# Patient Record
Sex: FEMALE | Race: WHITE | NOT HISPANIC OR LATINO | Employment: OTHER | URBAN - NONMETROPOLITAN AREA
[De-identification: names, ages, dates, MRNs, and addresses within clinical notes are randomized per-mention and may not be internally consistent; named-entity substitution may affect disease eponyms.]

---

## 2017-02-22 DIAGNOSIS — I10 BENIGN ESSENTIAL HYPERTENSION: ICD-10-CM

## 2017-02-22 RX ORDER — TRIAMTERENE/HYDROCHLOROTHIAZID 37.5-25 MG
TABLET ORAL
Qty: 90 TABLET | Refills: 0 | Status: SHIPPED | OUTPATIENT
Start: 2017-02-22 | End: 2017-03-15

## 2017-03-15 ENCOUNTER — OFFICE VISIT (OUTPATIENT)
Dept: PEDIATRICS | Facility: OTHER | Age: 62
End: 2017-03-15
Attending: INTERNAL MEDICINE
Payer: COMMERCIAL

## 2017-03-15 VITALS
SYSTOLIC BLOOD PRESSURE: 128 MMHG | DIASTOLIC BLOOD PRESSURE: 78 MMHG | TEMPERATURE: 97.4 F | WEIGHT: 163 LBS | HEART RATE: 57 BPM | OXYGEN SATURATION: 98 % | HEIGHT: 60 IN | RESPIRATION RATE: 20 BRPM | BODY MASS INDEX: 32 KG/M2

## 2017-03-15 DIAGNOSIS — Z12.11 COLON CANCER SCREENING: ICD-10-CM

## 2017-03-15 DIAGNOSIS — E78.2 MIXED HYPERLIPIDEMIA: ICD-10-CM

## 2017-03-15 DIAGNOSIS — I10 ESSENTIAL HYPERTENSION, BENIGN: ICD-10-CM

## 2017-03-15 DIAGNOSIS — I82.90 THROMBOSIS: ICD-10-CM

## 2017-03-15 DIAGNOSIS — H81.10 BPPV (BENIGN PAROXYSMAL POSITIONAL VERTIGO), UNSPECIFIED LATERALITY: ICD-10-CM

## 2017-03-15 DIAGNOSIS — Z00.00 ROUTINE GENERAL MEDICAL EXAMINATION AT A HEALTH CARE FACILITY: ICD-10-CM

## 2017-03-15 DIAGNOSIS — D47.3 ESSENTIAL THROMBOCYTOSIS (H): ICD-10-CM

## 2017-03-15 DIAGNOSIS — I82.90 THROMBOSIS: Primary | ICD-10-CM

## 2017-03-15 DIAGNOSIS — I10 BENIGN ESSENTIAL HYPERTENSION: ICD-10-CM

## 2017-03-15 DIAGNOSIS — Z00.00 ROUTINE GENERAL MEDICAL EXAMINATION AT A HEALTH CARE FACILITY: Primary | ICD-10-CM

## 2017-03-15 LAB
ALBUMIN SERPL-MCNC: 3.9 G/DL (ref 3.4–5)
ALP SERPL-CCNC: 97 U/L (ref 40–150)
ALT SERPL W P-5'-P-CCNC: 40 U/L (ref 0–50)
ANION GAP SERPL CALCULATED.3IONS-SCNC: 6 MMOL/L (ref 3–14)
AST SERPL W P-5'-P-CCNC: 19 U/L (ref 0–45)
BILIRUB SERPL-MCNC: 0.5 MG/DL (ref 0.2–1.3)
BUN SERPL-MCNC: 20 MG/DL (ref 7–30)
CALCIUM SERPL-MCNC: 9.4 MG/DL (ref 8.5–10.1)
CHLORIDE SERPL-SCNC: 105 MMOL/L (ref 94–109)
CHOLEST SERPL-MCNC: 186 MG/DL
CO2 SERPL-SCNC: 31 MMOL/L (ref 20–32)
CREAT SERPL-MCNC: 0.77 MG/DL (ref 0.52–1.04)
ERYTHROCYTE [DISTWIDTH] IN BLOOD BY AUTOMATED COUNT: 13.2 % (ref 10–15)
GFR SERPL CREATININE-BSD FRML MDRD: 76 ML/MIN/1.7M2
GLUCOSE SERPL-MCNC: 94 MG/DL (ref 70–99)
HCT VFR BLD AUTO: 44.9 % (ref 35–47)
HDLC SERPL-MCNC: 48 MG/DL
HGB BLD-MCNC: 15.4 G/DL (ref 11.7–15.7)
LDLC SERPL CALC-MCNC: 105 MG/DL
MCH RBC QN AUTO: 29.1 PG (ref 26.5–33)
MCHC RBC AUTO-ENTMCNC: 34.3 G/DL (ref 31.5–36.5)
MCV RBC AUTO: 85 FL (ref 78–100)
NONHDLC SERPL-MCNC: 138 MG/DL
PLATELET # BLD AUTO: 409 10E9/L (ref 150–450)
POTASSIUM SERPL-SCNC: 3.8 MMOL/L (ref 3.4–5.3)
PROT SERPL-MCNC: 7.6 G/DL (ref 6.8–8.8)
RBC # BLD AUTO: 5.29 10E12/L (ref 3.8–5.2)
SODIUM SERPL-SCNC: 142 MMOL/L (ref 133–144)
TRIGL SERPL-MCNC: 167 MG/DL
WBC # BLD AUTO: 6.8 10E9/L (ref 4–11)

## 2017-03-15 PROCEDURE — 85027 COMPLETE CBC AUTOMATED: CPT | Performed by: INTERNAL MEDICINE

## 2017-03-15 PROCEDURE — 80053 COMPREHEN METABOLIC PANEL: CPT | Performed by: INTERNAL MEDICINE

## 2017-03-15 PROCEDURE — 99396 PREV VISIT EST AGE 40-64: CPT | Performed by: INTERNAL MEDICINE

## 2017-03-15 PROCEDURE — 86803 HEPATITIS C AB TEST: CPT | Mod: 90 | Performed by: INTERNAL MEDICINE

## 2017-03-15 PROCEDURE — 36415 COLL VENOUS BLD VENIPUNCTURE: CPT | Performed by: INTERNAL MEDICINE

## 2017-03-15 PROCEDURE — 99000 SPECIMEN HANDLING OFFICE-LAB: CPT | Performed by: INTERNAL MEDICINE

## 2017-03-15 PROCEDURE — 80061 LIPID PANEL: CPT | Performed by: INTERNAL MEDICINE

## 2017-03-15 RX ORDER — TRIAMTERENE/HYDROCHLOROTHIAZID 37.5-25 MG
TABLET ORAL
Qty: 90 TABLET | Refills: 3 | Status: SHIPPED | OUTPATIENT
Start: 2017-03-15 | End: 2018-02-05

## 2017-03-15 RX ORDER — METOPROLOL SUCCINATE 100 MG/1
50 TABLET, EXTENDED RELEASE ORAL DAILY
Qty: 135 TABLET | Refills: 3 | Status: SHIPPED | OUTPATIENT
Start: 2017-03-15 | End: 2018-04-27

## 2017-03-15 ASSESSMENT — ENCOUNTER SYMPTOMS
PALPITATIONS: 0
BLOOD IN STOOL: 1
DEPRESSION: 0
DOUBLE VISION: 0
ABDOMINAL PAIN: 0
WHEEZING: 0
NERVOUS/ANXIOUS: 0
BLURRED VISION: 0
HEADACHES: 0
DIARRHEA: 0
FEVER: 0
DYSURIA: 0
MYALGIAS: 0
BACK PAIN: 0
NAUSEA: 0
SHORTNESS OF BREATH: 0
VOMITING: 0
DIZZINESS: 1
CONSTIPATION: 0
HEMATURIA: 0
COUGH: 0
CHILLS: 0
SORE THROAT: 0
INSOMNIA: 0

## 2017-03-15 ASSESSMENT — PAIN SCALES - GENERAL: PAINLEVEL: NO PAIN (0)

## 2017-03-15 NOTE — MR AVS SNAPSHOT
After Visit Summary   3/15/2017    Su Land    MRN: [id number]           Patient Information     Date Of Birth          1955        Visit Information        Provider Department      3/15/2017 8:20 AM Rosalino Castillo, DO [facility]        Today's Diagnoses     Routine general medical examination at a health care facility    -  1    Essential hypertension        Colon cancer screening        BPPV (benign paroxysmal positional vertigo), unspecified laterality        Essential thrombocytosis (H)        Benign essential hypertension        Essential hypertension, benign           Follow-ups after your visit        Additional Services     GENERAL SURG ADULT REFERRAL       Your provider has referred you to: PREFERRED PROVIDERS:  [facility]    Please be aware that coverage of these services is subject to the terms and limitations of your health insurance plan.  Call member services at your health plan with any benefit or coverage questions.      Please bring the following with you to your appointment:    (1) Any X-Rays, CTs or MRIs which have been performed.  Contact the facility where they were done to arrange for  prior to your scheduled appointment.   (2) List of current medications   (3) This referral request   (4) Any documents/labs given to you for this referral                  Follow-up notes from your care team     Return in about 1 year (around 3/15/2018), or annual physical.      Who to contact     If you have questions or need follow up information about today's clinic visit or your schedule please contact [facility] directly at [phone].  Normal or non-critical lab and imaging results will be communicated to you by MyChart, letter or phone within 4 business days after the clinic has received the results. If you do not hear from us within 7 days, please contact the clinic through MyChart or phone. If you have a critical or  abnormal lab result, we will notify you by phone as soon as possible.  Submit refill requests through [employer] or call your pharmacy and they will forward the refill request to us. Please allow 3 business days for your refill to be completed.          Additional Information About Your Visit        [email or web address] Information     [employer] gives you secure access to your electronic health record. If you see a primary care provider, you can also send messages to your care team and make appointments. If you have questions, please call your primary care clinic.  If you do not have a primary care provider, please call [phone] and they will assist you.        Care EveryWhere ID     This is your Care EveryWhere ID. This could be used by other organizations to access your [place] medical records  [id number]        Your Vitals Were     Pulse Temperature Respirations Height Pulse Oximetry BMI (Body Mass Index)    57 97.4  F (36.3  C) (Tympanic) 20 5' (1.524 m) 98% 31.83 kg/m2       Blood Pressure from Last 3 Encounters:   03/15/17 128/78   01/07/16 115/68   12/31/15 134/78    Weight from Last 3 Encounters:   03/15/17 163 lb (73.9 kg)   01/07/16 159 lb (72.1 kg)   11/19/15 160 lb (72.6 kg)              We Performed the Following     GENERAL SURG ADULT REFERRAL     Hepatitis C antibody          Today's Medication Changes          These changes are accurate as of: 3/15/17  8:29 AM.  If you have any questions, ask your nurse or doctor.               These medicines have changed or have updated prescriptions.        Dose/Directions    metoprolol 100 MG 24 hr tablet   Commonly known as:  TOPROL-XL   This may have changed:  how much to take   Used for:  Essential hypertension, benign   Changed by:  Rosalnio Castillo,         Dose:  50 mg   Take 0.5 tablets (50 mg) by mouth daily TAKE 1 TABLET BY MOUTH EVERY MORNING AND 1/2 TABLET BY MOUTH EVERY NIGHT AT BEDTIME   Quantity:  135 tablet   Refills:  3        triamterene-hydrochlorothiazide 37.5-25 MG per tablet   Commonly known as:  MAXZIDE-25   This may have changed:  See the new instructions.   Used for:  Benign essential hypertension   Changed by:  Rosalino Castillo DO        TAKE 1 TABLET BY MOUTH DAILY IN THE MORNING   Quantity:  90 tablet   Refills:  3            Where to get your medicines      These medications were sent to [id number] - [place], [place] - [address] AT [facility] [address], [place] [address]     Phone:  [phone]     metoprolol 100 MG 24 hr tablet    triamterene-hydrochlorothiazide 37.5-25 MG per tablet                Primary Care Provider Office Phone # Fax #    Rosalino Castillo -[phone]       [facility] [place] [address]  [place] [address]        Thank you!     [facility] you [facility]  [facility] your [facility]. Our goal is always to provide you with excellent care. Hearing back from our patients is one way we can continue to improve our services. Please take a few minutes to complete the written survey that you may receive in the mail after your visit with us. Thank you!             Your Updated Medication List - Protect others around you: Learn how to safely use, store and throw away your medicines at www.disposemymeds.org.          This list is accurate as of: 3/15/17  8:29 AM.  Always use your most recent med list.                   Brand Name Dispense Instructions for use    ASPIRIN PO      Take 81 mg by mouth daily       metoprolol 100 MG 24 hr tablet    TOPROL-XL    135 tablet    Take 0.5 tablets (50 mg) by mouth daily TAKE 1 TABLET BY MOUTH EVERY MORNING AND 1/2 TABLET BY MOUTH EVERY NIGHT AT BEDTIME       Multi-vitamin Tabs tablet      Take 1 tablet by mouth daily       triamterene-hydrochlorothiazide 37.5-25 MG per tablet    MAXZIDE-25    90 tablet    TAKE 1 TABLET BY MOUTH DAILY IN THE MORNING

## 2017-03-15 NOTE — NURSING NOTE
Chief Complaint   Patient presents with     Physical     Colonoscopy     due       Initial /78  Pulse 57  Temp 97.4  F (36.3  C) (Tympanic)  Resp 20  Ht 5' (1.524 m)  Wt 163 lb (73.9 kg)  SpO2 98%  BMI 31.83 kg/m2 Estimated body mass index is 31.83 kg/(m^2) as calculated from the following:    Height as of this encounter: 5' (1.524 m).    Weight as of this encounter: 163 lb (73.9 kg).  Medication Reconciliation: makenzie Guardado

## 2017-03-15 NOTE — PROGRESS NOTES
HPI  Patient is a 60 yo female with HTN and essential thrombocytosis who presents for her annual exam.  Her last PAP was in .  She  Is due for a colonoscopy.  Her last mammogram was in 2017.        Past Medical History   Diagnosis Date     Abnormal weight gain      Acute pharyngitis      Acute sinusitis, unspecified      Conjunctivitis unspecified      Essential thrombocytosis (H)      Confirmed with bone marrow biopsy     H/O mammogram 2014     Hypertension      Other screening mammogram      Pain in joint, lower leg      Routine general medical examination at a health care facility      Special screening for malignant neoplasms, colon      Streptococcal sore throat      Past Surgical History   Procedure Laterality Date     Abdomen surgery        x2     Appendectomy       Gyn surgery        x2     Orthopedic surgery  2013     left total knee replacement     Orthopedic surgery  10/28/2014     right total knee replacement     Colonoscopy  age 50      Normal         Review of Systems   Constitutional: Negative for chills, fever and malaise/fatigue.   HENT: Negative for congestion, ear pain, hearing loss, sore throat and tinnitus.    Eyes: Negative for blurred vision and double vision.        Her last eye exam was 10/2016.   Respiratory: Negative for cough, shortness of breath and wheezing.    Cardiovascular: Negative for chest pain, palpitations and leg swelling.   Gastrointestinal: Positive for blood in stool. Negative for abdominal pain, constipation, diarrhea, melena, nausea and vomiting.   Genitourinary: Negative for dysuria and hematuria.        She denies any urinary retention or incontinence.   Musculoskeletal: Negative for back pain, joint pain and myalgias.   Skin: Negative for rash.   Neurological: Positive for dizziness. Negative for headaches.        She has a bad dizziness spell after her mothers .   Psychiatric/Behavioral: Negative for depression. The patient is not  nervous/anxious and does not have insomnia.          Physical Exam   Constitutional: She is oriented to person, place, and time and well-developed, well-nourished, and in no distress. No distress.   HENT:   Head: Normocephalic.   Right Ear: Tympanic membrane, external ear and ear canal normal.   Left Ear: Tympanic membrane, external ear and ear canal normal.   Mouth/Throat: No oropharyngeal exudate or posterior oropharyngeal edema.   Eyes: EOM are normal. Pupils are equal, round, and reactive to light. No scleral icterus.   Neck: Neck supple. Carotid bruit is not present. No thyromegaly present.   Cardiovascular: Normal rate, regular rhythm, normal heart sounds and intact distal pulses.    No murmur heard.  Pulmonary/Chest: Effort normal and breath sounds normal. She has no wheezes. She has no rales.   Abdominal: Soft. Bowel sounds are normal. She exhibits no shifting dullness, no distension, no pulsatile midline mass and no mass. There is no hepatosplenomegaly. There is no tenderness. Hernia confirmed negative in the umbilical area and confirmed negative in the ventral area.   Musculoskeletal: She exhibits no edema.   Lymphadenopathy:     She has no cervical adenopathy.   Neurological: She is alert and oriented to person, place, and time. She has normal strength, normal reflexes and intact cranial nerves. She displays no atrophy, no tremor, facial symmetry and normal speech. Gait normal. Gait normal.   Reflex Scores:       Brachioradialis reflexes are 2+ on the right side and 2+ on the left side.       Patellar reflexes are 2+ on the right side and 2+ on the left side.       Achilles reflexes are 2+ on the right side and 2+ on the left side.  Skin: No rash noted.   Psychiatric: Mood, memory, affect and judgment normal.       Breasts:Patient refused breast exam since her mammogram was one months ago.        Labs:  Results for orders placed or performed in visit on 03/15/17   CBC with platelets   Result Value Ref  Range    WBC 6.8 4.0 - 11.0 10e9/L    RBC Count 5.29 (H) 3.8 - 5.2 10e12/L    Hemoglobin 15.4 11.7 - 15.7 g/dL    Hematocrit 44.9 35.0 - 47.0 %    MCV 85 78 - 100 fl    MCH 29.1 26.5 - 33.0 pg    MCHC 34.3 31.5 - 36.5 g/dL    RDW 13.2 10.0 - 15.0 %    Platelet Count 409 150 - 450 10e9/L   Comprehensive metabolic panel (BMP + Alb, Alk Phos, ALT, AST, Total. Bili, TP)   Result Value Ref Range    Sodium 142 133 - 144 mmol/L    Potassium 3.8 3.4 - 5.3 mmol/L    Chloride 105 94 - 109 mmol/L    Carbon Dioxide 31 20 - 32 mmol/L    Anion Gap 6 3 - 14 mmol/L    Glucose 94 70 - 99 mg/dL    Urea Nitrogen 20 7 - 30 mg/dL    Creatinine 0.77 0.52 - 1.04 mg/dL    GFR Estimate 76 >60 mL/min/1.7m2    GFR Estimate If Black >90   GFR Calc   >60 mL/min/1.7m2    Calcium 9.4 8.5 - 10.1 mg/dL    Bilirubin Total 0.5 0.2 - 1.3 mg/dL    Albumin 3.9 3.4 - 5.0 g/dL    Protein Total 7.6 6.8 - 8.8 g/dL    Alkaline Phosphatase 97 40 - 150 U/L    ALT 40 0 - 50 U/L    AST 19 0 - 45 U/L   Lipid Profile (Chol, Trig, HDL, LDL calc)   Result Value Ref Range    Cholesterol 186 <200 mg/dL    Triglycerides 167 (H) <150 mg/dL    HDL Cholesterol 48 (L) >49 mg/dL    LDL Cholesterol Calculated 105 (H) <100 mg/dL    Non HDL Cholesterol 138 (H) <130 mg/dL           Imaging:  NA    ASSESSMENT /PLAN:  (Z00.00) Routine general medical examination at a health care facility  (primary encounter diagnosis)  Comment: She is up to date on her PAP and mammogram.  Her immunizations are up to date.  Plan:   She is due for a colonoscopy.    (I10) Benign essential hypertension  Comment: At goal.  Plan:   She will continue Toprol XL 50 mg and UQRXOYY75.6-25 mg daily.    (Z12.11) Colon cancer screening  Comment:   Plan:   GENERAL SURG ADULT REFERRAL    (H81.10) BPPV (benign paroxysmal positional vertigo), unspecified laterality  Comment: She has infrequent symptoms.  Plan:  Consider Meclizine if her symptoms become more prominent or frequent.     (D47.3)  Essential thrombocytosis (H)  Comment: Her pletelet count is well controlled at 400,000  Plan:   She will continue ASA 81 mg daily.      (E78.2) Mixed hyperlipidemia  Comment: The 10-year ASCVD risk score (Tyler GILL Jr., et al., 2013) is: 5.6%    Values used to calculate the score:      Age: 61 years      Sex: Female      Is Non- : No      Diabetic: No      Tobacco smoker: No      Systolic Blood Pressure: 128 mmHg      Is Prescribed Antihypertensives: Yes      HDL Cholesterol: 48 mg/dL      Total Cholesterol: 186 mg/dL    Plan:  Her risk of CAD is low at this time with well controlled blood pressure She will make a point to be more active to reduce her risk.            Follow up with Provider - 1 year for an annual physical.        Rosalino Castillo DO

## 2017-03-16 LAB — HCV AB SERPL QL IA: NORMAL

## 2017-03-17 ENCOUNTER — TELEPHONE (OUTPATIENT)
Dept: SURGERY | Facility: OTHER | Age: 62
End: 2017-03-17

## 2017-03-17 DIAGNOSIS — Z12.11 SCREENING FOR MALIGNANT NEOPLASM OF COLON: Primary | ICD-10-CM

## 2017-03-17 RX ORDER — SODIUM, POTASSIUM,MAG SULFATES 17.5-3.13G
2 SOLUTION, RECONSTITUTED, ORAL ORAL SEE ADMIN INSTRUCTIONS
Qty: 2 BOTTLE | Refills: 0 | Status: ON HOLD | COMMUNITY
Start: 2017-03-17 | End: 2017-04-17

## 2017-03-17 NOTE — PATIENT INSTRUCTIONS
Thank you for allowing Dr Faulkner and our surgical team to participate in your care.  If you have a scheduling or an appointment question please contact Devika, our Health Unit Coordinator, at her direct line [phone].   ALL nursing questions or concerns can be directed to your surgical nurse at: [phone]-Afua    You are scheduled for a: Colonoscopy with possible biopsy   Your procedure date is: Monday, April 17, 2017    You have been ordered Suprep as your mechanical bowel prep. Please pick this up from your preferred pharmacy.      Bowel Prep    Clear liquid diet only on Sunday, April 16, 2017  the day before the examination.    Steve prep - one bottle at 6pm Sunday, April 16, 2017 the evening prior to the examination and follow with Two 16 ounce glasses water.    Steve prep - one bottle at 0400 on Monday, April 17, 2017 the day of the exam.  Follow with Two 16 ounce glasses of water.    Nothing by mouth after finishing the second 16 ounce glass of water the morning of the procedure.         HOW TO PREPARE-        You need a friend or family member available to drive you home AND stay with you for 4 hours after you leave the hospital. You will not be allowed to drive yourself. IF you need to take a taxi or the bus you MUST have a responsible person to ride with you. YOUR PROCEDURE WILL BE CANCELLED IF YOU DO NOT HAVE A RESPONSIBLE ADULT TO DRIVE YOU HOME.       You need to call our Surgery Education Nurses 1-2 weeks prior to your surgery date at [phone] or toll free [phone]. Please have you medication and allergy lists ready.       Stop your aspirin or other NSAIDs(Ibuprofen, Motrin, Aleve, Celebrex, Naproxen, etc...) 7 days before your surgery.      Hospital admitting will call you the day before your surgery with your arrival time. If you are scheduled on a Monday admitting will call you the Friday before.      Please call your primary care physician if you should become ill within 24 hours of  scheduled surgery. (ex.vomiting, diarrhea, fever)

## 2017-03-17 NOTE — MR AVS SNAPSHOT
After Visit Summary   3/17/2017    Su Land    MRN: [id number]           Patient Information     Date Of Birth          1955        Visit Information        Provider Department      3/17/2017 10:07 AM Isabelle Faulkner MD [facility]        Care Instructions    Thank you for allowing Dr Faulkner and our surgical team to participate in your care.  If you have a scheduling or an appointment question please contact Devika, our Health Unit Coordinator, at her direct line [phone].   ALL nursing questions or concerns can be directed to your surgical nurse at: [phone]    You are scheduled for a: Colonoscopy with possible biopsy   Your procedure date is: Monday April 17, 2017    You have been ordered Suprep as your mechanical bowel prep. Please pick this up from your preferred pharmacy.     Bowel Prep    Clear liquid diet only on Sunday, April 16, 2017  the day before the examination.    Steve prep - one bottle at 6pm Sunday, April 16, 2017 the evening prior to the examination and follow with Two 16 ounce glasses water.    Steve prep - one bottle at 0400 on Monday, April 17, 2017 the day of the exam.  Follow with Two 16 ounce glasses of water.    Nothing by mouth after finishing the second 16 ounce glass of water the morning of the procedure.         HOW TO PREPARE-        You need a friend or family member available to drive you home AND stay with you for 4 hours after you leave the hospital. You will not be allowed to drive yourself. IF you need to take a taxi or the bus you MUST have a responsible person to ride with you. YOUR PROCEDURE WILL BE CANCELLED IF YOU DO NOT HAVE A RESPONSIBLE ADULT TO DRIVE YOU HOME.       You need to call our Surgery Education Nurses 1-2 weeks prior to your surgery date at [phone] or toll free [phone]. Please have you medication and allergy lists ready.       Stop your aspirin or other NSAIDs(Ibuprofen, Motrin, Aleve, Celebrex,  Naproxen, etc...) 7 days before your surgery.      Hospital admitting will call you the day before your surgery with your arrival time. If you are scheduled on a Monday admitting will call you the Friday before.      Please call your primary care physician if you should become ill within 24 hours of scheduled surgery. (ex.vomiting, diarrhea, fever)          Follow-ups after your visit        Who to contact     If you have questions or need follow up information about today's clinic visit or your schedule please contact [facility] directly at [phone].  Normal or non-critical lab and imaging results will be communicated to you by [employer], letter or phone within 4 business days after the clinic has received the results. If you do not hear from us within 7 days, please contact the clinic through [employer] or phone. If you have a critical or abnormal lab result, we will notify you by phone as soon as possible.  Submit refill requests through [employer] or call your pharmacy and they will forward the refill request to us. Please allow 3 business days for your refill to be completed.          Additional Information About Your Visit        [employer] Information     [employer] gives you secure access to your electronic health record. If you see a primary care provider, you can also send messages to your care team and make appointments. If you have questions, please call your primary care clinic.  If you do not have a primary care provider, please call [phone] and they will assist you.        Care EveryWhere ID     This is your Care EveryWhere ID. This could be used by other organizations to access your [place] medical records  [id number]         Blood Pressure from Last 3 Encounters:   03/15/17 128/78   01/07/16 115/68   12/31/15 134/78    Weight from Last 3 Encounters:   03/15/17 163 lb (73.9 kg)   01/07/16 159 lb (72.1 kg)   11/19/15 160 lb (72.6 kg)              Today, you had the following     No orders  found for display       Primary Care Provider Office Phone # Fax #    Rosalino Castillo -[phone]       [facility] [place] [address]  [facility] [address]        Thank you!     Thank you for choosing [facility] [place]  for your care. Our goal is always to provide you with excellent care. Hearing back from our patients is one way we can continue to improve our services. Please take a few minutes to complete the written survey that you may receive in the mail after your visit with us. Thank you!             Your Updated Medication List - Protect others around you: Learn how to safely use, store and throw away your medicines at www.disposemymeds.org.          This list is accurate as of: 3/17/17 10:08 AM.  Always use your most recent med list.                   Brand Name Dispense Instructions for use    ASPIRIN PO      Take 81 mg by mouth daily       metoprolol 100 MG 24 hr tablet    TOPROL-XL    135 tablet    Take 0.5 tablets (50 mg) by mouth daily TAKE 1 TABLET BY MOUTH EVERY MORNING AND 1/2 TABLET BY MOUTH EVERY NIGHT AT BEDTIME       Multi-vitamin Tabs tablet      Take 1 tablet by mouth daily       Na Sulfate-K Sulfate-Mg Sulf solution    SUPREP BOWEL PREP    2 Bottle    Take 354 mLs (2 Bottles) by mouth See Admin Instructions       triamterene-hydrochlorothiazide 37.5-25 MG per tablet    MAXZIDE-25    90 tablet    TAKE 1 TABLET BY MOUTH DAILY IN THE MORNING

## 2017-03-17 NOTE — PATIENT INSTRUCTIONS
Thank you for allowing Dr Faulkner and our surgical team to participate in your care.  If you have a scheduling or an appointment question please contact Devika, our Health Unit Coordinator, at her direct line [phone].   ALL nursing questions or concerns can be directed to your surgical nurse at: [phone]    You are scheduled for a: Colonoscopy with possible biopsy   Your procedure date is: Monday April 17, 2017    You have been ordered Suprep as your mechanical bowel prep. Please pick this up from your preferred pharmacy.     Bowel Prep    Clear liquid diet only on Sunday, April 16, 2017  the day before the examination.    Steve prep - one bottle at 6pm Sunday, April 16, 2017 the evening prior to the examination and follow with Two 16 ounce glasses water.    Steve prep - one bottle at 0400 on Monday, April 17, 2017 the day of the exam.  Follow with Two 16 ounce glasses of water.    Nothing by mouth after finishing the second 16 ounce glass of water the morning of the procedure.         HOW TO PREPARE-        You need a friend or family member available to drive you home AND stay with you for 4 hours after you leave the hospital. You will not be allowed to drive yourself. IF you need to take a taxi or the bus you MUST have a responsible person to ride with you. YOUR PROCEDURE WILL BE CANCELLED IF YOU DO NOT HAVE A RESPONSIBLE ADULT TO DRIVE YOU HOME.       You need to call our Surgery Education Nurses 1-2 weeks prior to your surgery date at [phone] or toll free [phone]. Please have you medication and allergy lists ready.       Stop your aspirin or other NSAIDs(Ibuprofen, Motrin, Aleve, Celebrex, Naproxen, etc...) 7 days before your surgery.      Hospital admitting will call you the day before your surgery with your arrival time. If you are scheduled on a Monday admitting will call you the Friday before.      Please call your primary care physician if you should become ill within 24 hours of  scheduled surgery. (ex.vomiting, diarrhea, fever)

## 2017-03-17 NOTE — TELEPHONE ENCOUNTER
Patient contacted via telephone regarding a referral placed by Dr Castillo to the surgery department for a colonoscopy.  Patient is scheduled for April 17, 2017 with Dr Faulkner.

## 2017-04-13 NOTE — OR NURSING
Su phoned in and [facility] does not have Suprep prescription.  [facility].  Hanna Mack

## 2017-04-17 ENCOUNTER — HOSPITAL ENCOUNTER (OUTPATIENT)
Facility: HOSPITAL | Age: 62
Discharge: HOME | End: 2017-04-17
Attending: SURGERY | Admitting: SURGERY
Payer: COMMERCIAL

## 2017-04-17 ENCOUNTER — SURGERY (OUTPATIENT)
Age: 62
End: 2017-04-17

## 2017-04-17 ENCOUNTER — ANESTHESIA (OUTPATIENT)
Dept: SURGERY | Facility: HOSPITAL | Age: 62
End: 2017-04-17
Payer: COMMERCIAL

## 2017-04-17 ENCOUNTER — ANESTHESIA EVENT (OUTPATIENT)
Dept: SURGERY | Facility: HOSPITAL | Age: 62
End: 2017-04-17
Payer: COMMERCIAL

## 2017-04-17 VITALS
OXYGEN SATURATION: 97 % | DIASTOLIC BLOOD PRESSURE: 71 MMHG | WEIGHT: 162.2 LBS | SYSTOLIC BLOOD PRESSURE: 123 MMHG | BODY MASS INDEX: 31.84 KG/M2 | HEART RATE: 65 BPM | RESPIRATION RATE: 16 BRPM | TEMPERATURE: 96.7 F | HEIGHT: 60 IN

## 2017-04-17 PROCEDURE — 40000306 ZZH STATISTIC PRE PROC ASSESS II: Performed by: SURGERY

## 2017-04-17 PROCEDURE — 01999 UNLISTED ANES PROCEDURE: CPT | Performed by: NURSE ANESTHETIST, CERTIFIED REGISTERED

## 2017-04-17 PROCEDURE — 25000125 ZZHC RX 250: Performed by: NURSE ANESTHETIST, CERTIFIED REGISTERED

## 2017-04-17 PROCEDURE — 45385 COLONOSCOPY W/LESION REMOVAL: CPT | Mod: PT | Performed by: ANESTHESIOLOGY

## 2017-04-17 PROCEDURE — 36000050 ZZH SURGERY LEVEL 2 1ST 30 MIN: Performed by: SURGERY

## 2017-04-17 PROCEDURE — 88305 TISSUE EXAM BY PATHOLOGIST: CPT | Mod: TC | Performed by: SURGERY

## 2017-04-17 PROCEDURE — 25000128 H RX IP 250 OP 636: Performed by: NURSE ANESTHETIST, CERTIFIED REGISTERED

## 2017-04-17 PROCEDURE — 71000027 ZZH RECOVERY PHASE 2 EACH 15 MINS: Performed by: SURGERY

## 2017-04-17 PROCEDURE — 37000008 ZZH ANESTHESIA TECHNICAL FEE, 1ST 30 MIN: Performed by: SURGERY

## 2017-04-17 PROCEDURE — 25800025 ZZH RX 258: Performed by: ANESTHESIOLOGY

## 2017-04-17 PROCEDURE — 45380 COLONOSCOPY AND BIOPSY: CPT | Mod: PT | Performed by: SURGERY

## 2017-04-17 RX ORDER — FENTANYL CITRATE 50 UG/ML
INJECTION, SOLUTION INTRAMUSCULAR; INTRAVENOUS PRN
Status: DISCONTINUED | OUTPATIENT
Start: 2017-04-17 | End: 2017-04-17

## 2017-04-17 RX ORDER — HYDRALAZINE HYDROCHLORIDE 20 MG/ML
2.5-5 INJECTION INTRAMUSCULAR; INTRAVENOUS EVERY 10 MIN PRN
Status: DISCONTINUED | OUTPATIENT
Start: 2017-04-17 | End: 2017-04-17 | Stop reason: HOSPADM

## 2017-04-17 RX ORDER — LIDOCAINE HYDROCHLORIDE 20 MG/ML
INJECTION, SOLUTION INFILTRATION; PERINEURAL PRN
Status: DISCONTINUED | OUTPATIENT
Start: 2017-04-17 | End: 2017-04-17

## 2017-04-17 RX ORDER — NALOXONE HYDROCHLORIDE 0.4 MG/ML
.1-.4 INJECTION, SOLUTION INTRAMUSCULAR; INTRAVENOUS; SUBCUTANEOUS
Status: DISCONTINUED | OUTPATIENT
Start: 2017-04-17 | End: 2017-04-17 | Stop reason: HOSPADM

## 2017-04-17 RX ORDER — ONDANSETRON 4 MG/1
4 TABLET, ORALLY DISINTEGRATING ORAL EVERY 30 MIN PRN
Status: DISCONTINUED | OUTPATIENT
Start: 2017-04-17 | End: 2017-04-17 | Stop reason: HOSPADM

## 2017-04-17 RX ORDER — SODIUM CHLORIDE, SODIUM LACTATE, POTASSIUM CHLORIDE, CALCIUM CHLORIDE 600; 310; 30; 20 MG/100ML; MG/100ML; MG/100ML; MG/100ML
INJECTION, SOLUTION INTRAVENOUS CONTINUOUS
Status: DISCONTINUED | OUTPATIENT
Start: 2017-04-17 | End: 2017-04-17 | Stop reason: HOSPADM

## 2017-04-17 RX ORDER — MEPERIDINE HYDROCHLORIDE 25 MG/ML
12.5 INJECTION INTRAMUSCULAR; INTRAVENOUS; SUBCUTANEOUS
Status: DISCONTINUED | OUTPATIENT
Start: 2017-04-17 | End: 2017-04-17 | Stop reason: HOSPADM

## 2017-04-17 RX ORDER — KETOROLAC TROMETHAMINE 30 MG/ML
30 INJECTION, SOLUTION INTRAMUSCULAR; INTRAVENOUS EVERY 6 HOURS PRN
Status: DISCONTINUED | OUTPATIENT
Start: 2017-04-17 | End: 2017-04-17 | Stop reason: HOSPADM

## 2017-04-17 RX ORDER — DEXAMETHASONE SODIUM PHOSPHATE 4 MG/ML
4 INJECTION, SOLUTION INTRA-ARTICULAR; INTRALESIONAL; INTRAMUSCULAR; INTRAVENOUS; SOFT TISSUE EVERY 10 MIN PRN
Status: DISCONTINUED | OUTPATIENT
Start: 2017-04-17 | End: 2017-04-17 | Stop reason: HOSPADM

## 2017-04-17 RX ORDER — ONDANSETRON 2 MG/ML
4 INJECTION INTRAMUSCULAR; INTRAVENOUS EVERY 30 MIN PRN
Status: DISCONTINUED | OUTPATIENT
Start: 2017-04-17 | End: 2017-04-17 | Stop reason: HOSPADM

## 2017-04-17 RX ORDER — PROMETHAZINE HYDROCHLORIDE 25 MG/ML
12.5 INJECTION, SOLUTION INTRAMUSCULAR; INTRAVENOUS
Status: DISCONTINUED | OUTPATIENT
Start: 2017-04-17 | End: 2017-04-17 | Stop reason: HOSPADM

## 2017-04-17 RX ORDER — FENTANYL CITRATE 50 UG/ML
25-50 INJECTION, SOLUTION INTRAMUSCULAR; INTRAVENOUS
Status: DISCONTINUED | OUTPATIENT
Start: 2017-04-17 | End: 2017-04-17 | Stop reason: HOSPADM

## 2017-04-17 RX ORDER — PROPOFOL 10 MG/ML
INJECTION, EMULSION INTRAVENOUS PRN
Status: DISCONTINUED | OUTPATIENT
Start: 2017-04-17 | End: 2017-04-17

## 2017-04-17 RX ORDER — LABETALOL HYDROCHLORIDE 5 MG/ML
10 INJECTION, SOLUTION INTRAVENOUS
Status: DISCONTINUED | OUTPATIENT
Start: 2017-04-17 | End: 2017-04-17 | Stop reason: HOSPADM

## 2017-04-17 RX ORDER — LIDOCAINE 40 MG/G
CREAM TOPICAL
Status: DISCONTINUED | OUTPATIENT
Start: 2017-04-17 | End: 2017-04-17 | Stop reason: HOSPADM

## 2017-04-17 RX ORDER — ALBUTEROL SULFATE 0.83 MG/ML
2.5 SOLUTION RESPIRATORY (INHALATION) EVERY 4 HOURS PRN
Status: DISCONTINUED | OUTPATIENT
Start: 2017-04-17 | End: 2017-04-17 | Stop reason: HOSPADM

## 2017-04-17 RX ADMIN — LIDOCAINE HYDROCHLORIDE 40 MG: 20 INJECTION, SOLUTION INFILTRATION; PERINEURAL at 08:45

## 2017-04-17 RX ADMIN — PROPOFOL 10 MG: 10 INJECTION, EMULSION INTRAVENOUS at 08:50

## 2017-04-17 RX ADMIN — PROPOFOL 10 MG: 10 INJECTION, EMULSION INTRAVENOUS at 08:54

## 2017-04-17 RX ADMIN — MIDAZOLAM HYDROCHLORIDE 2 MG: 1 INJECTION, SOLUTION INTRAMUSCULAR; INTRAVENOUS at 08:42

## 2017-04-17 RX ADMIN — FENTANYL CITRATE 100 MCG: 50 INJECTION, SOLUTION INTRAMUSCULAR; INTRAVENOUS at 08:42

## 2017-04-17 RX ADMIN — PROPOFOL 10 MG: 10 INJECTION, EMULSION INTRAVENOUS at 09:00

## 2017-04-17 RX ADMIN — PROPOFOL 10 MG: 10 INJECTION, EMULSION INTRAVENOUS at 08:48

## 2017-04-17 RX ADMIN — PROPOFOL 10 MG: 10 INJECTION, EMULSION INTRAVENOUS at 08:52

## 2017-04-17 RX ADMIN — SODIUM CHLORIDE, POTASSIUM CHLORIDE, SODIUM LACTATE AND CALCIUM CHLORIDE: 600; 310; 30; 20 INJECTION, SOLUTION INTRAVENOUS at 08:03

## 2017-04-17 RX ADMIN — PROPOFOL 10 MG: 10 INJECTION, EMULSION INTRAVENOUS at 08:57

## 2017-04-17 RX ADMIN — PROPOFOL 20 MG: 10 INJECTION, EMULSION INTRAVENOUS at 08:45

## 2017-04-17 RX ADMIN — PROPOFOL 10 MG: 10 INJECTION, EMULSION INTRAVENOUS at 09:02

## 2017-04-17 RX ADMIN — PROPOFOL 10 MG: 10 INJECTION, EMULSION INTRAVENOUS at 08:46

## 2017-04-17 RX ADMIN — PROPOFOL 10 MG: 10 INJECTION, EMULSION INTRAVENOUS at 08:47

## 2017-04-17 NOTE — DISCHARGE INSTRUCTIONS
INSTRUCTIONS AFTER COLONOSCOPY    WHEN YOU ARE BACK HOME:    Plan to rest for an hour or two after you get home.    You may have some cramping or pressure until you pass gas.    You may resume your regular medications.    Eat a small, light meal at first, and then gradually return to normal meal sizes.  If you had a polyp removed:    Slight bleeding may occur.  You may have a slight blood stain on the toilet paper after a bowel movement.    To lessen the chance of bleeding, avoid heavy exercise for ONE WEEK.  This includes heavy lifting, vigorous sport activities, and heavy physical labor.  You may resume your normal sexual activity.      Avoid aspirin or aspirin products if instructed by your doctor.    WHAT TO WATCH FOR:  Problems rarely occur after the exam; however, it is important for you to watch for early signs of possible problems.  If you have     Unusual pain in your abdomen    Nausea and vomiting that persists    Excessive bleeding    Black or bloody bowel movements    Fever or temperature above 100.6 F  Please call your doctor ([facility] [phone]) or go to the nearest hospital emergency room.    Post-Anesthesia Patient Instructions    IMMEDIATELY FOLLOWING SURGERY:  Do not drive or operate machinery for the first twenty four hours after surgery.  Do not make any important decisions for twenty four hours after surgery or while taking narcotic pain medications or sedatives.  If you develop intractable nausea and vomiting or a severe headache please notify your doctor immediately.    FOLLOW-UP:  Please make an appointment with your surgeon as instructed. You do not need to follow up with anesthesia unless specifically instructed to do so.    WOUND CARE INSTRUCTIONS (if applicable):  Keep a dry clean dressing on the anesthesia/puncture wound site if there is drainage.  Once the wound has quit draining you may leave it open to air.  Generally you should leave the bandage intact for twenty four  hours unless there is drainage.  If the epidural site drains for more than 36-48 hours please call the anesthesia department.    QUESTIONS?:  Please feel free to call your physician or the hospital  if you have any questions, and they will be happy to assist you.

## 2017-04-17 NOTE — IP AVS SNAPSHOT
[facility] II    [address] [address]    Phone:  [phone]                                       After Visit Summary   4/17/2017    Su Land    MRN: [id number]           After Visit Summary Signature Page     I have received my discharge instructions, and my questions have been answered. I have discussed any challenges I see with this plan with the nurse or doctor.    ..........................................................................................................................................  Patient/Patient Representative Signature      ..........................................................................................................................................  Patient Representative Print Name and Relationship to Patient    ..................................................               ................................................  Date                                            Time    ..........................................................................................................................................  Reviewed by Signature/Title    ...................................................              ..............................................  Date                                                            Time

## 2017-04-17 NOTE — OP NOTE
Su Land MRN# [id number]   YOB: 1955 Age: 61 year old      Date of Admission:  4/17/2017    Primary care provider: Rosalino Castillo    PREOPERATIVE DIAGNOSIS:   Screening colonoscopy.         POSTOPERATIVE DIAGNOSIS:    Sessile polyp, 45 cm, sessile polyps x 4 rectum (all < 0.4 cm)    PROCEDURE:  Whole colon colonoscopy, cold forceps polypectomy x 5.         INDICATIONS:  This 61 year old female presents for interval screening colonoscopy.        OPERATIVE FINDINGS:  There were very small sessile polyps noted, one at 45 cm and 4 in the rectum retrieved with the cold forceps.  The colonic mucosa was otherwise without finding from anus to cecum.          DESCRIPTION OF PROCEDURE:  With the patient in the supine position on the transport cart, IV sedation was administered by the nurse anesthetist.  Her correct identity and planned procedure were confirmed during the requisite timeout pause and she was rolled to the left lateral position.  The anus was digitally dilated and the fiberoptic colonoscope was introduced and negotiated through the length of the colon to the cecal base.  The cecum was intubated and its landmarks clearly identified.  A circumferential examination of the mucosa on introduction of the colonoscope and on its slow withdrawal confirmed the presence of one 0.5 cm polyp vs hyperplastic mucosa at 45 cm and 4 similar benign appearing lesions in the rectum.  The rectal sessile lesions were < 0.4 cm diameter.  There was no other suggestion of neoplasia, inflammation and/or stricture.  There were no diverticuli noted.  Retroflex in the rectal ampulla showed no evidence of pathology.  Air was aspirated and the colonoscope was withdrawn; the patient was returned to day surgery in good condition, without suggestion of complication.  Her return interval will be dictated by histopathologic examination of the specimens with hyperplastic alone suggesting 10 year and number if  adenomatous defining 3 vs 5 years.   The post surgical debriefing was held and acknowledged at completion.          JACKLYN NORIEGA MD, FACS     4/17/2017 9:04 AM

## 2017-04-17 NOTE — ANESTHESIA CARE TRANSFER NOTE
Patient: Su Land    Procedure(s):  WHOLE COLON COLONOSCOPY WITH POLYPECTOMY - Wound Class: II-Clean Contaminated    Diagnosis: SCREENING FOR COLON CANCER  Diagnosis Additional Information: No value filed.    Anesthesia Type:   MAC     Note:  Airway :Nasal Cannula  Patient transferred to:Phase II        Vitals: (Last set prior to Anesthesia Care Transfer)    CRNA VITALS  4/17/2017 0836 - 4/17/2017 0909      4/17/2017             Resp Rate (set): 8                Electronically Signed By: ANTHONY Soto CRNA  April 17, 2017  9:09 AM

## 2017-04-17 NOTE — CONSULTS
[facility] Surgery Consultation    CC:  Screening colonosocpy    HPI:  This 61 year old year old female is seen at the request of Dr. Castillo for evaluation and performance of interval colonoscopy.   The patient had one examination 11 years ago without finding.  She has occasional hemorrhoid symptoms; there is no family history of colon cancer.    Past Medical History:   Diagnosis Date     Abnormal weight gain      Acute pharyngitis      Acute sinusitis, unspecified      Conjunctivitis unspecified      Essential thrombocytosis (H)     Confirmed with bone marrow biopsy     H/O mammogram 2014     Hypertension      Other screening mammogram      Pain in joint, lower leg      Routine general medical examination at a health care facility      Special screening for malignant neoplasms, colon      Streptococcal sore throat        Past Surgical History:   Procedure Laterality Date     ABDOMEN SURGERY       x2     APPENDECTOMY       COLONOSCOPY  age 50    Normal     GYN SURGERY       x2     ORTHOPEDIC SURGERY  2013    left total knee replacement     ORTHOPEDIC SURGERY  10/28/2014    right total knee replacement       Current Facility-Administered Medications   Medication     lidocaine 1 % 1 mL     sodium chloride (PF) 0.9% PF flush 3 mL     lactated ringers infusion     lidocaine 1 % 1 mL     lidocaine (LMX4) kit     sodium chloride (PF) 0.9% PF flush 3 mL     sodium chloride (PF) 0.9% PF flush 3 mL       Allergies   Allergen Reactions     Codeine Itching     HABITS:    Social History   Substance Use Topics     Smoking status: Never Smoker     Smokeless tobacco: Never Used     Alcohol use Yes      Comment: occasional       Family History   Problem Relation Age of Onset     Hypertension Brother      Other - See Comments Brother      Stroke     Hypertension Brother      Other - See Comments Brother      PVD     Other - See Comments Father 51     cerebral aneurysm; cause of death     Hypertension  Mother      Thyroid Disease Mother      hypothyroidism     HEART DISEASE Mother      myocardial infarction     Psychotic Disorder Mother      dementia     C.A.D. Mother 70     Alzheimer Disease Mother      Hypertension Sister      C.A.D. Sister      Thyroid Disease Sister      hypothyroid     HEART DISEASE Sister 59     MI      Unknown/Adopted Maternal Grandmother      Unknown/Adopted Maternal Grandfather      Unknown/Adopted Paternal Grandmother      Unknown/Adopted Paternal Grandfather        REVIEW OF SYSTEMS:  Ten point review of systems negative except those mentioned in the HPI.     OBJECTIVE:    BP (!) 159/101  Pulse 65  Temp 97  F (36.1  C) (Axillary)  Ht 1.524 m (5')  Wt 73.6 kg (162 lb 3.2 oz)  BMI 31.68 kg/m2    GENERAL: Generally appears well, in no distress with appropriate affect.  HEENT:   Sclerae anicteric - No cervical, supra/infraclavciular lymphadenopathy, no thyroid masses  Respiratory:  Lungs clear to ausculation bilaterally with good air excursion  Cardiovascular:  Regular Rate and Rhythm with no murmurs gallops or rubs, normal   Abdomen:  Lower midline healed scar from ; soft, nontender  :  deferred  Extremities:  Extremities normal. No deformities, edema, or skin discoloration.  Skin:  no suspicious lesions or rashes  Neurological: grossly intact    Psych:  Alert, oriented, affect appropriate with normal decision making ability.    IMPRESSION:  Satisfactory candidate for colonoscopy.  The indications, risks, benefits and technical aspects of whole colon colonoscopy were outlined with risks including, but not limited to, perforation, bleeding and inability to visualize entire colon.  Management of each was reviewed.  The need of mechanical preparation of the colon was reviewed along with the use of monitored anesthetic care.  The patient's questions were asked and answered.     PLAN:  Bowel prep complete - will proceed.    Isabelle Faulkner MD, FACS    2017  8:34 AM    cc:   Dr. Castillo

## 2017-04-17 NOTE — OR NURSING
Patient and responsible adult given discharge instructions with no questions regarding instructions. Judi score 20. Pain level 0/10.  Discharged from unit via ambulatory. Patient discharged to home with spouse.

## 2017-04-17 NOTE — IP AVS SNAPSHOT
MRN:[id number]                      After Visit Summary   4/17/2017    Su Land    MRN: [id number]           Thank you!     Thank you for choosing [facility] for your care. Our goal is always to provide you with excellent care. Hearing back from our patients is one way we can continue to improve our services. Please take a few minutes to complete the written survey that you may receive in the mail after you visit with us. Thank you!        Patient Information     Date Of Birth          1955        About your hospital stay     You were admitted on:  April 17, 2017 You last received care in the:  [facility]    You were discharged on:  April 17, 2017       Who to Call     For medical emergencies, please call 911.  For non-urgent questions about your medical care, please call your primary care provider or clinic, [phone]  For questions related to your surgery, please call your surgery clinic        Attending Provider     Provider Specialty    Isabelle Faulkner MD General Surgery       Primary Care Provider Office Phone # Fax #    Rosalino Oni Castillo [phone]       [facility] [place] [address] [facility] [address]        Further instructions from your care team           INSTRUCTIONS AFTER COLONOSCOPY    WHEN YOU ARE BACK HOME:    Plan to rest for an hour or two after you get home.    You may have some cramping or pressure until you pass gas.    You may resume your regular medications.    Eat a small, light meal at first, and then gradually return to normal meal sizes.  If you had a polyp removed:    Slight bleeding may occur.  You may have a slight blood stain on the toilet paper after a bowel movement.    To lessen the chance of bleeding, avoid heavy exercise for ONE WEEK.  This includes heavy lifting, vigorous sport activities, and heavy physical labor.  You may resume your normal sexual activity.      Avoid aspirin or aspirin products if  instructed by your doctor.    WHAT TO WATCH FOR:  Problems rarely occur after the exam; however, it is important for you to watch for early signs of possible problems.  If you have     Unusual pain in your abdomen    Nausea and vomiting that persists    Excessive bleeding    Black or bloody bowel movements    Fever or temperature above 100.6 F  Please call your doctor ([facility] [phone]) or go to the nearest hospital emergency room.    Post-Anesthesia Patient Instructions    IMMEDIATELY FOLLOWING SURGERY:  Do not drive or operate machinery for the first twenty four hours after surgery.  Do not make any important decisions for twenty four hours after surgery or while taking narcotic pain medications or sedatives.  If you develop intractable nausea and vomiting or a severe headache please notify your doctor immediately.    FOLLOW-UP:  Please make an appointment with your surgeon as instructed. You do not need to follow up with anesthesia unless specifically instructed to do so.    WOUND CARE INSTRUCTIONS (if applicable):  Keep a dry clean dressing on the anesthesia/puncture wound site if there is drainage.  Once the wound has quit draining you may leave it open to air.  Generally you should leave the bandage intact for twenty four hours unless there is drainage.  If the epidural site drains for more than 36-48 hours please call the anesthesia department.    QUESTIONS?:  Please feel free to call your physician or the hospital  if you have any questions, and they will be happy to assist you.       Pending Results     No orders found from 4/15/2017 to 4/18/2017.            Admission Information     Date & Time Provider Department Dept. Phone    4/17/2017 Isabelle Faulkner MD [facility] Preop/Phase [phone]      Your Vitals Were     Blood Pressure Pulse Temperature Respirations Height Weight    99/62 65 97  F (36.1  C) (Axillary) 18 1.524 m (5') 73.6 kg (162 lb 3.2 oz)    Pulse Oximetry BMI (Body Mass  Index)                95% 31.68 kg/m2          [email or web address] Information     [email or web address] gives you secure access to your electronic health record. If you see a primary care provider, you can also send messages to your care team and make appointments. If you have questions, please call your primary care clinic.  If you do not have a primary care provider, please call [phone] and they will assist you.        Care EveryWhere ID     This is your Care EveryWhere ID. This could be used by other organizations to access your [facility] medical records  [id number]           Review of your medicines      CONTINUE these medicines which may have CHANGED, or have new prescriptions. If we are uncertain of the size of tablets/capsules you have at home, strength may be listed as something that might have changed.        Dose / Directions    metoprolol 100 MG 24 hr tablet   Commonly known as:  TOPROL-XL   This may have changed:  additional instructions   Used for:  Essential hypertension, benign        Dose:  50 mg   Take 0.5 tablets (50 mg) by mouth daily TAKE 1 TABLET BY MOUTH EVERY MORNING AND 1/2 TABLET BY MOUTH EVERY NIGHT AT BEDTIME   Quantity:  135 tablet   Refills:  3         CONTINUE these medicines which have NOT CHANGED        Dose / Directions    ASPIRIN PO        Dose:  81 mg   Take 81 mg by mouth daily   Refills:  0       Multi-vitamin Tabs tablet        Dose:  1 tablet   Take 1 tablet by mouth daily   Refills:  0       triamterene-hydrochlorothiazide 37.5-25 MG per tablet   Commonly known as:  MAXZIDE-25   Used for:  Benign essential hypertension        TAKE 1 TABLET BY MOUTH DAILY IN THE MORNING   Quantity:  90 tablet   Refills:  3         STOP taking     Na Sulfate-K Sulfate-Mg Sulf solution   Commonly known as:  SUPREP BOWEL PREP                    Protect others around you: Learn how to safely use, store and throw away your medicines at www.disposemymeds.org.             Medication List: This is a list of all your  medications and when to take them. Check marks below indicate your daily home schedule. Keep this list as a reference.      Medications           Morning Afternoon Evening Bedtime As Needed    ASPIRIN PO   Take 81 mg by mouth daily                                metoprolol 100 MG 24 hr tablet   Commonly known as:  TOPROL-XL   Take 0.5 tablets (50 mg) by mouth daily TAKE 1 TABLET BY MOUTH EVERY MORNING AND 1/2 TABLET BY MOUTH EVERY NIGHT AT BEDTIME                                Multi-vitamin Tabs tablet   Take 1 tablet by mouth daily                                triamterene-hydrochlorothiazide 37.5-25 MG per tablet   Commonly known as:  MAXZIDE-25   TAKE 1 TABLET BY MOUTH DAILY IN THE MORNING

## 2017-04-17 NOTE — ANESTHESIA PREPROCEDURE EVALUATION
Anesthesia Evaluation     . Pt has had prior anesthetic.     History of anesthetic complications   - PONV        ROS/MED HX    ENT/Pulmonary:  - neg pulmonary ROS     Neurologic:     (+)other neuro BPPV (benign paroxysmal positional vertigo)    Cardiovascular:     (+) Dyslipidemia, hypertension-range: on beta blocker (metoprolol) , ---. : . . . :. .       METS/Exercise Tolerance:     Hematologic:     (+) Other Hematologic Disorder-Essential thrombocytosis       Musculoskeletal:   (+) arthritis, , , other musculoskeletal- DJD      GI/Hepatic:     (+) bowel prep,       Renal/Genitourinary:  - ROS Renal section negative       Endo:     (+) Obesity, .      Psychiatric:  - neg psychiatric ROS       Infectious Disease:  - neg infectious disease ROS       Malignancy:      - no malignancy   Other:    - neg other ROS                 Physical Exam  Normal systems: cardiovascular, pulmonary and dental    Airway   Mallampati: IV  TM distance: >3 FB  Neck ROM: full    Dental     Cardiovascular   Rhythm and rate: regular and normal      Pulmonary    breath sounds clear to auscultation                    Anesthesia Plan      History & Physical Review  History and physical reviewed and following examination; no interval change.    ASA Status:  2 .    NPO Status:  > 8 hours    Plan for MAC with Intravenous and Propofol induction. Maintenance will be TIVA.    PONV prophylaxis:  Ondansetron (or other 5HT-3)  Surgeon requests deep sedation. Patient has a Mallampati 4 airway. Will provide MAC.      Postoperative Care  Postoperative pain management:  Multi-modal analgesia.      Consents  Anesthetic plan, risks, benefits and alternatives discussed with:  Patient..                          .

## 2017-04-18 NOTE — ANESTHESIA POSTPROCEDURE EVALUATION
Patient: Su Land    Procedure(s):  WHOLE COLON COLONOSCOPY WITH POLYPECTOMY - Wound Class: II-Clean Contaminated    Diagnosis:SCREENING FOR COLON CANCER  Diagnosis Additional Information: No value filed.    Anesthesia Type:  MAC    Note:  Anesthesia Post Evaluation    Patient location during evaluation: Phase 2 and Bedside  Patient participation: Able to fully participate in evaluation  Level of consciousness: awake and alert  Pain management: adequate  Airway patency: patent  Cardiovascular status: acceptable  Respiratory status: acceptable  Hydration status: stable  PONV: none     Anesthetic complications: None          Last vitals:  Vitals:    04/17/17 0935 04/17/17 0940 04/17/17 0945   BP: 99/77 121/73 123/71   Pulse:      Resp: 16 16 16   Temp:      SpO2: 96% 96% 97%         Electronically Signed By: Chadd Brown MD  April 18, 2017  5:57 AM

## 2017-04-19 LAB — COPATH REPORT: NORMAL

## 2017-10-02 ENCOUNTER — HOSPITAL ENCOUNTER (EMERGENCY)
Facility: HOSPITAL | Age: 62
Discharge: HOME | End: 2017-10-02
Attending: NURSE PRACTITIONER | Admitting: NURSE PRACTITIONER
Payer: COMMERCIAL

## 2017-10-02 VITALS
OXYGEN SATURATION: 98 % | DIASTOLIC BLOOD PRESSURE: 80 MMHG | TEMPERATURE: 97.3 F | SYSTOLIC BLOOD PRESSURE: 143 MMHG | HEART RATE: 60 BPM | RESPIRATION RATE: 20 BRPM

## 2017-10-02 DIAGNOSIS — J06.9 VIRAL URI WITH COUGH: ICD-10-CM

## 2017-10-02 PROCEDURE — 99202 OFFICE O/P NEW SF 15 MIN: CPT | Performed by: NURSE PRACTITIONER

## 2017-10-02 PROCEDURE — 99213 OFFICE O/P EST LOW 20 MIN: CPT

## 2017-10-02 RX ORDER — MOMETASONE FUROATE MONOHYDRATE 50 UG/1
2 SPRAY, METERED NASAL DAILY
Qty: 1 G | Refills: 0 | Status: SHIPPED | OUTPATIENT
Start: 2017-10-02 | End: 2017-11-01

## 2017-10-02 ASSESSMENT — ENCOUNTER SYMPTOMS
TROUBLE SWALLOWING: 0
EYES NEGATIVE: 1
FEVER: 0
NAUSEA: 0
GASTROINTESTINAL NEGATIVE: 1
RHINORRHEA: 1
COUGH: 1
MUSCULOSKELETAL NEGATIVE: 1
EYE REDNESS: 0
VOMITING: 0
DIARRHEA: 0
HEMATOLOGIC/LYMPHATIC NEGATIVE: 1
SINUS PRESSURE: 1
CARDIOVASCULAR NEGATIVE: 1
NEUROLOGICAL NEGATIVE: 1

## 2017-10-02 NOTE — ED AVS SNAPSHOT
[facility]    [address]    [place] [address]    Phone:  [phone]                                       Su Land   MRN: [id number]    Department:  [facility]   Date of Visit:  10/2/2017           After Visit Summary Signature Page     I have received my discharge instructions, and my questions have been answered. I have discussed any challenges I see with this plan with the nurse or doctor.    ..........................................................................................................................................  Patient/Patient Representative Signature      ..........................................................................................................................................  Patient Representative Print Name and Relationship to Patient    ..................................................               ................................................  Date                                            Time    ..........................................................................................................................................  Reviewed by Signature/Title    ...................................................              ..............................................  Date                                                            Time

## 2017-10-02 NOTE — ED AVS SNAPSHOT
[facility]    [address]    [place] [address]    Phone:  [phone]                                       Su Land   MRN: [id number]    Department:  [facility]   Date of Visit:  10/2/2017           Patient Information     Date Of Birth          1955        Your diagnoses for this visit were:     Viral URI with cough        You were seen by Serenity Ball, ZACKERY.      Follow-up Information     Follow up with Rosalino Castillo DO.    Specialties:  Internal Medicine, Pediatrics    Why:  As needed, If symptoms worsen    Contact information:    [facility] [place]  [address]  [place] [address] [phone]          Follow up with [facility].    Specialty:  EMERGENCY MEDICINE    Why:  As needed, If symptoms worsen    Contact information:    [address]  [place] [address] [phone]    Additional information:    From [place]: Take [address]. Turn left at [address]. Turn left at the first stoplight on [address]. At the first stop sign, take a right onto [address]. Take a left into the parking lot and continue through until you reach the North enterance of the building.       From [place]: Take [address]. Take the [address] ramp towards [place]. Turn left onto [address]. Take a slight right onto [address] [facility]. Turn left at the first stoplight on [address]. At the first stop sign, take a right onto [address]. Take a left into the parking lot and continue through until you reach the North enterance of the building.       From [place]: Take [address]. Take a right at [address]. At the first stop sign, take a right onto [address]. Take a left into the parking lot and continue through until you reach the North enterance of the building.       Discharge References/Attachments     SINUSITIS (NO ANTIBIOTICS) (ENGLISH)          Review of your medicines      START taking        Dose / Directions Last dose taken    mometasone 50 MCG/ACT spray   Commonly known as:  NASONEX   Dose:  2 spray   Quantity:  1 g        Spray 2 sprays in nostril daily   Refills:  0          Our records show that you are taking the medicines listed below. If these are incorrect, please call your family doctor or clinic.        Dose / Directions Last dose taken    ASPIRIN PO   Dose:  81 mg        Take 81 mg by mouth daily   Refills:  0        metoprolol 100 MG 24 hr tablet   Commonly known as:  TOPROL-XL   Dose:  50 mg   Quantity:  135 tablet        Take 0.5 tablets (50 mg) by mouth daily TAKE 1 TABLET BY MOUTH EVERY MORNING AND 1/2 TABLET BY MOUTH EVERY NIGHT AT BEDTIME   Refills:  3        Multi-vitamin Tabs tablet   Dose:  1 tablet        Take 1 tablet by mouth daily   Refills:  0        triamterene-hydrochlorothiazide 37.5-25 MG per tablet   Commonly known as:  MAXZIDE-25   Quantity:  90 tablet        TAKE 1 TABLET BY MOUTH DAILY IN THE MORNING   Refills:  3                Prescriptions were sent or printed at these locations (1 Prescription)                   [facility] [address] [place] [address] [address] AT [facility] [address]   [address], [facility] [address]    Telephone:  [phone]   Fax:  [phone]   Hours:                  E-Prescribed (1 of 1)         mometasone (NASONEX) 50 MCG/ACT spray                Orders Needing Specimen Collection     None      Pending Results     No orders found from 9/30/2017 to 10/3/2017.            Pending Culture Results     No orders found from 9/30/2017 to 10/3/2017.            Thank you for choosing [facility]       Thank you for choosing [facility] for your care. Our goal is always to provide you with excellent care. Hearing back from our patients is one way we can continue to improve our services. Please take a few minutes to complete the written survey that you may receive in the mail after you  visit with us. Thank you!        [facility] Information     [email or web address] gives you secure access to your electronic health record. If you see a primary care provider, you can also send messages to your care team and make appointments. If you have questions, please call your primary care clinic.  If you do not have a primary care provider, please call [phone] and they will assist you.        Care EveryWhere ID     This is your Care EveryWhere ID. This could be used by other organizations to access your [facility] medical records  [id number]        Equal Access to Services     KATHYA MACKAY : Cori irving Sodominic, wahayes ortega, audrey kaalluna valdez, norma enamorado. So [facility] [phone].    ATENCIÓN: Si habla español, tiene a ojeda disposición servicios gratuitos de asistencia lingüística. Llame al [phone].    We comply with applicable federal civil rights laws and [place] laws. We do not discriminate on the basis of race, color, national origin, age, disability, sex, sexual orientation, or gender identity.            After Visit Summary       This is your record. Keep this with you and show to your community pharmacist(s) and doctor(s) at your next visit.

## 2017-10-02 NOTE — ED PROVIDER NOTES
History     Chief Complaint   Patient presents with     Cough     The history is provided by the patient. No  was used.     Su Land is a 61 year old female who presents with 5 days of coughing, PND. No fever.  Cough is worse when she lies down flat and is paroxysmal at times    I have reviewed the Medications, Allergies, Past Medical and Surgical History, and Social History in the Epic system.        Review of Systems   Constitutional: Negative for fever.   HENT: Positive for congestion, postnasal drip, rhinorrhea and sinus pressure. Negative for trouble swallowing.    Eyes: Negative.  Negative for redness.   Respiratory: Positive for cough.    Cardiovascular: Negative.    Gastrointestinal: Negative.  Negative for diarrhea, nausea and vomiting.   Genitourinary: Negative.  Negative for decreased urine volume.   Musculoskeletal: Negative.    Skin: Negative.  Negative for rash.   Neurological: Negative.    Hematological: Negative.        Physical Exam   BP: 143/80  Pulse: 60  Temp: 97.3  F (36.3  C)  Resp: 20  SpO2: 98 %  Physical Exam   Constitutional: She is oriented to person, place, and time. She appears well-developed and well-nourished. No distress.   Congested sounding   HENT:   Head: Normocephalic and atraumatic.   Right Ear: External ear normal.   Left Ear: External ear normal.   Mouth/Throat: Oropharynx is clear and moist.   Nasal mucosa is swollen and erythematous and violaceous  Posterior pharynx with patchy erythema   Eyes: Conjunctivae and EOM are normal. Pupils are equal, round, and reactive to light. Right eye exhibits no discharge. Left eye exhibits no discharge.   Neck: Normal range of motion. Neck supple.   Cardiovascular: Normal rate, regular rhythm and normal heart sounds.  Exam reveals no gallop and no friction rub.    No murmur heard.  Pulmonary/Chest: Effort normal. She has no wheezes. She has no rales.   Abdominal: Soft. Bowel sounds are normal. She exhibits no  mass. There is no tenderness. There is no rebound and no guarding.   No CVA tenderness   Musculoskeletal: Normal range of motion.   Lymphadenopathy:     She has no cervical adenopathy.   Neurological: She is alert and oriented to person, place, and time.   Skin: Skin is warm and dry. No rash noted. She is not diaphoretic. No erythema.   Nursing note and vitals reviewed.      ED Course     ED Course     Procedures               Labs Ordered and Resulted from Time of ED Arrival Up to the Time of Departure from the ED - No data to display    Assessments & Plan (with Medical Decision Making)     I have reviewed the nursing notes.  Treat sx and decongest, mucinex, use afrin nasal decongestant for 3 days on and 3 days off.   F/U with PCP for worsening or new sx  I have reviewed the findings, diagnosis, plan and need for follow up with the patient.      Discharge Medication List as of 10/2/2017 12:08 PM      START taking these medications    Details   mometasone (NASONEX) 50 MCG/ACT spray Spray 2 sprays in nostril daily, Disp-1 g, R-0, E-Prescribe             Final diagnoses:   Viral URI with cough     Pt verbalizes understanding and agreement with plan.  Follow up for worsening symptoms    10/2/2017   [facility]     Serenity Ball NP  10/02/17 [id number]

## 2017-10-02 NOTE — ED NOTES
Pt presents today alone for c/o a cough that started last week Wednesday and is progressively getting worse that is productive at times, producing yellow sputum. She says her chest feels tight from coughing.

## 2017-10-03 ENCOUNTER — MYC MEDICAL ADVICE (OUTPATIENT)
Dept: PEDIATRICS | Facility: OTHER | Age: 62
End: 2017-10-03

## 2017-10-04 ENCOUNTER — OFFICE VISIT (OUTPATIENT)
Dept: PEDIATRICS | Facility: OTHER | Age: 62
End: 2017-10-04
Attending: INTERNAL MEDICINE
Payer: COMMERCIAL

## 2017-10-04 VITALS
WEIGHT: 171 LBS | DIASTOLIC BLOOD PRESSURE: 80 MMHG | SYSTOLIC BLOOD PRESSURE: 116 MMHG | OXYGEN SATURATION: 98 % | TEMPERATURE: 97.5 F | HEIGHT: 59 IN | BODY MASS INDEX: 34.47 KG/M2 | HEART RATE: 53 BPM

## 2017-10-04 DIAGNOSIS — J01.10 ACUTE NON-RECURRENT FRONTAL SINUSITIS: Primary | ICD-10-CM

## 2017-10-04 DIAGNOSIS — B37.31 YEAST INFECTION OF THE VAGINA: ICD-10-CM

## 2017-10-04 DIAGNOSIS — J45.909 BRONCHITIS, ALLERGIC, UNSPECIFIED ASTHMA SEVERITY, UNCOMPLICATED: ICD-10-CM

## 2017-10-04 PROCEDURE — 99213 OFFICE O/P EST LOW 20 MIN: CPT | Performed by: INTERNAL MEDICINE

## 2017-10-04 RX ORDER — FLUCONAZOLE 150 MG/1
150 TABLET ORAL ONCE
Qty: 1 TABLET | Refills: 0 | Status: SHIPPED | OUTPATIENT
Start: 2017-10-04 | End: 2017-10-04

## 2017-10-04 RX ORDER — AMOXICILLIN 500 MG/1
1000 CAPSULE ORAL 3 TIMES DAILY
Qty: 30 CAPSULE | Refills: 0 | Status: SHIPPED | OUTPATIENT
Start: 2017-10-04 | End: 2018-04-03

## 2017-10-04 RX ORDER — ALBUTEROL SULFATE 90 UG/1
2 AEROSOL, METERED RESPIRATORY (INHALATION) EVERY 6 HOURS PRN
Qty: 1 INHALER | Refills: 0 | Status: SHIPPED | OUTPATIENT
Start: 2017-10-04 | End: 2017-10-28

## 2017-10-04 ASSESSMENT — ANXIETY QUESTIONNAIRES
6. BECOMING EASILY ANNOYED OR IRRITABLE: NOT AT ALL
GAD7 TOTAL SCORE: 0
7. FEELING AFRAID AS IF SOMETHING AWFUL MIGHT HAPPEN: NOT AT ALL
5. BEING SO RESTLESS THAT IT IS HARD TO SIT STILL: NOT AT ALL
1. FEELING NERVOUS, ANXIOUS, OR ON EDGE: NOT AT ALL
3. WORRYING TOO MUCH ABOUT DIFFERENT THINGS: NOT AT ALL
4. TROUBLE RELAXING: NOT AT ALL
2. NOT BEING ABLE TO STOP OR CONTROL WORRYING: NOT AT ALL

## 2017-10-04 ASSESSMENT — PAIN SCALES - GENERAL: PAINLEVEL: MILD PAIN (3)

## 2017-10-04 ASSESSMENT — PATIENT HEALTH QUESTIONNAIRE - PHQ9: SUM OF ALL RESPONSES TO PHQ QUESTIONS 1-9: 2

## 2017-10-04 ASSESSMENT — ENCOUNTER SYMPTOMS
SINUS PAIN: 1
DIAPHORESIS: 0
NAUSEA: 0
SORE THROAT: 1
ABDOMINAL PAIN: 0
PALPITATIONS: 0
CONSTIPATION: 0
FEVER: 0
HEADACHES: 0
DIARRHEA: 0
SPUTUM PRODUCTION: 1
SHORTNESS OF BREATH: 0
CHILLS: 0
VOMITING: 0
COUGH: 1
HEARTBURN: 0
WHEEZING: 0

## 2017-10-04 NOTE — NURSING NOTE
"Chief Complaint   Patient presents with     COPD     was seen [facility] 10/2/17 Serenity Ball       Initial /80 (BP Location: Right arm, Patient Position: Sitting, Cuff Size: Adult Regular)  Pulse 53  Temp 97.5  F (36.4  C) (Tympanic)  Ht 4' 11\" (1.499 m)  Wt 171 lb (77.6 kg)  SpO2 98%  BMI 34.54 kg/m2 Estimated body mass index is 34.54 kg/(m^2) as calculated from the following:    Height as of this encounter: 4' 11\" (1.499 m).    Weight as of this encounter: 171 lb (77.6 kg).  Medication Reconciliation: makenzie Bashir      "

## 2017-10-04 NOTE — PROGRESS NOTES
HPI  Patient is a 62 yo female who presents for cough and post nasal drip for the past week.  She was placed on Nasonex.  Her sputum is inconsistent in color.  She reports that her chest feels tight.  Her cough is worse at night.  She reports sinus pressure.  She has had some night sweats.  She feels warm.  She denies vomiting or diarrhea.  She has no illness exposures.      Past Medical History:   Diagnosis Date     Abnormal weight gain      Acute pharyngitis      Acute sinusitis, unspecified      Conjunctivitis unspecified      Essential thrombocytosis (H)     Confirmed with bone marrow biopsy     H/O mammogram 2014     Hypertension      Other screening mammogram      Pain in joint, lower leg      Routine general medical examination at a health care facility      Special screening for malignant neoplasms, colon      Streptococcal sore throat      Past Surgical History:   Procedure Laterality Date     ABDOMEN SURGERY       x2     APPENDECTOMY       COLONOSCOPY  age 50    Normal     COLONOSCOPY N/A 2017    Procedure: COLONOSCOPY;  WHOLE COLON COLONOSCOPY WITH POLYPECTOMY;  Surgeon: Isabelle Faulkner MD;  Location: [facility]     GYN SURGERY       x2     ORTHOPEDIC SURGERY  2013    left total knee replacement     ORTHOPEDIC SURGERY  10/28/2014    right total knee replacement         Review of Systems   Constitutional: Negative for chills, diaphoresis and fever.   HENT: Positive for sinus pain and sore throat. Negative for hearing loss.         She report ear fullness   Respiratory: Positive for cough and sputum production. Negative for shortness of breath and wheezing.    Cardiovascular: Negative for chest pain, palpitations and leg swelling.   Gastrointestinal: Negative for abdominal pain, constipation, diarrhea, heartburn, nausea and vomiting.   Neurological: Negative for headaches.     /80 (BP Location: Right arm, Patient Position: Sitting, Cuff Size: Adult Regular)  Pulse 53   "Temp 97.5  F (36.4  C) (Tympanic)  Ht 4' 11\" (1.499 m)  Wt 171 lb (77.6 kg)  SpO2 98%  BMI 34.54 kg/m2    Physical Exam   Constitutional: She is oriented to person, place, and time and well-developed, well-nourished, and in no distress. No distress.   HENT:   Head: Normocephalic.   Right Ear: External ear and ear canal normal. A middle ear effusion is present.   Left Ear: Tympanic membrane, external ear and ear canal normal.   Nose: Right sinus exhibits frontal sinus tenderness. Right sinus exhibits no maxillary sinus tenderness. Left sinus exhibits frontal sinus tenderness. Left sinus exhibits no maxillary sinus tenderness.   Mouth/Throat: No oropharyngeal exudate or posterior oropharyngeal edema.   Eyes: No scleral icterus.   Neck: Neck supple.   Cardiovascular: Normal rate, regular rhythm, normal heart sounds and intact distal pulses.    No murmur heard.  Pulses:       Radial pulses are 2+ on the right side, and 2+ on the left side.   Pulmonary/Chest: Effort normal and breath sounds normal. She has no wheezes. She has no rales.   Musculoskeletal: She exhibits no edema.   Lymphadenopathy:     She has no cervical adenopathy.   Neurological: She is alert and oriented to person, place, and time.     Labs:  NA      Imaging:  NA      ASSESSMENT /PLAN:  (J01.10) Acute non-recurrent frontal sinusitis  (primary encounter diagnosis)  Comment:   Plan:   amoxicillin (AMOXIL) 500 MG capsule, 1000 mg TID for 10 days.      (B37.3) Yeast infection of the vagina  Comment: Patient typically gets yeast infections with antibiotics  Plan:   fluconazole (DIFLUCAN) 150 MG tablet x one dose post antibiotics      (J45.909) Bronchitis, allergic, unspecified asthma severity, uncomplicated  Comment: She has some chest tightness with normnal lung exam.  She gets this frequently in te fall months.  Plan:   albuterol (PROAIR HFA/PROVENTIL HFA/VENTOLIN  HFA) 108 (90 BASE) MCG/ACT Inhaler PRN  She has been advised to use zyrtec 10 mg at " bedtime until the ground freezes for at least 5 days.        Follow up with Provider - ALEX Castillo DO

## 2017-10-04 NOTE — MR AVS SNAPSHOT
"              After Visit Summary   10/4/2017    Su Land    MRN: [id number]           Patient Information     Date Of Birth          1955        Visit Information        Provider Department      10/4/2017 10:40 AM Rosalino Castillo, DO [facility]        Today's Diagnoses     Acute non-recurrent frontal sinusitis    -  1    Yeast infection of the vagina        Bronchitis, allergic, unspecified asthma severity, uncomplicated           Follow-ups after your visit        Who to contact     If you have questions or need follow up information about today's clinic visit or your schedule please contact [facility] directly at [phone].  Normal or non-critical lab and imaging results will be communicated to you by [employer], letter or phone within 4 business days after the clinic has received the results. If you do not hear from us within 7 days, please contact the clinic through [employer] or phone. If you have a critical or abnormal lab result, we will notify you by phone as soon as possible.  Submit refill requests through [employer] or call your pharmacy and they will forward the refill request to us. Please allow 3 business days for your refill to be completed.          Additional Information About Your Visit        MyChart Information     [employer] gives you secure access to your electronic health record. If you see a primary care provider, you can also send messages to your care team and make appointments. If you have questions, please call your primary care clinic.  If you do not have a primary care provider, please call [phone] and they will assist you.        Care EveryWhere ID     This is your Care EveryWhere ID. This could be used by other organizations to access your [facility] medical records  [id number]        Your Vitals Were     Pulse Temperature Height Pulse Oximetry BMI (Body Mass Index)       53 97.5  F (36.4  C) (Tympanic) 4' 11\" (1.499 m) 98% 34.54 kg/m2  "       Blood Pressure from Last 3 Encounters:   10/04/17 116/80   10/02/17 143/80   04/17/17 123/71    Weight from Last 3 Encounters:   10/04/17 171 lb (77.6 kg)   04/17/17 162 lb 3.2 oz (73.6 kg)   03/15/17 163 lb (73.9 kg)              Today, you had the following     No orders found for display         Today's Medication Changes          These changes are accurate as of: 10/4/17 11:08 AM.  If you have any questions, ask your nurse or doctor.               Start taking these medicines.        Dose/Directions    albuterol 108 (90 BASE) MCG/ACT Inhaler   Commonly known as:  PROAIR HFA/PROVENTIL HFA/VENTOLIN HFA   Used for:  Bronchitis, allergic, unspecified asthma severity, uncomplicated   Started by:  Rosalino Castillo DO        Dose:  2 puff   Inhale 2 puffs into the lungs every 6 hours as needed for shortness of breath / dyspnea or wheezing   Quantity:  1 Inhaler   Refills:  0       amoxicillin 500 MG capsule   Commonly known as:  AMOXIL   Used for:  Acute non-recurrent frontal sinusitis   Started by:  Rosalino Castillo DO        Dose:  1000 mg   Take 2 capsules (1,000 mg) by mouth 3 times daily   Quantity:  30 capsule   Refills:  0       fluconazole 150 MG tablet   Commonly known as:  DIFLUCAN   Used for:  Yeast infection of the vagina   Started by:  Rosalino Castillo DO        Dose:  150 mg   Take 1 tablet (150 mg) by mouth once for 1 dose   Quantity:  1 tablet   Refills:  0         These medicines have changed or have updated prescriptions.        Dose/Directions    metoprolol 100 MG 24 hr tablet   Commonly known as:  TOPROL-XL   This may have changed:  additional instructions   Used for:  Essential hypertension, benign        Dose:  50 mg   Take 0.5 tablets (50 mg) by mouth daily TAKE 1 TABLET BY MOUTH EVERY MORNING AND 1/2 TABLET BY MOUTH EVERY NIGHT AT BEDTIME   Quantity:  135 tablet   Refills:  3            Where to get your medicines      [facility] [place] [facility] [address] AT [facility] [address]  [address], [place] [address]     Phone:  [phone]     albuterol 108 (90 BASE) MCG/ACT Inhaler    amoxicillin 500 MG capsule    fluconazole 150 MG tablet                Primary Care Provider Office Phone # Fax #    Rosalino Castillo [phone]       [facility] [place] [address]  [facility] [address]        Equal Access to Services     [facility] THOMPSON : Hadii aad ku hadasho Soomaali, waaxda luqadaha, qaybta kaalmada adeegyada, waxay idiin hayaan adeeg renetta pisano . So [facility] [phone].    ATENCIÓN: Si habla español, tiene a ojeda disposición servicios gratuitos de asistencia lingüística. [facility] [phone].    We comply with applicable federal civil rights laws and [place] laws. We do not discriminate on the basis of race, color, national origin, age, disability, sex, sexual orientation, or gender identity.            Thank you!     Thank you for choosing [facility]  for your care. Our goal is always to provide you with excellent care. Hearing back from our patients is one way we can continue to improve our services. Please take a few minutes to complete the written survey that you may receive in the mail after your visit with us. Thank you!             Your Updated Medication List - Protect others around you: Learn how to safely use, store and throw away your medicines at www.disposemymeds.org.          This list is accurate as of: 10/4/17 11:08 AM.  Always use your most recent med list.                   Brand Name Dispense Instructions for use Diagnosis    albuterol 108 (90 BASE) MCG/ACT Inhaler    PROAIR HFA/PROVENTIL HFA/VENTOLIN HFA    1 Inhaler    Inhale 2 puffs into the lungs every 6 hours as needed for shortness of breath / dyspnea or wheezing    Bronchitis, allergic, unspecified asthma severity, uncomplicated       amoxicillin 500 MG capsule    AMOXIL    30 capsule    Take 2  capsules (1,000 mg) by mouth 3 times daily    Acute non-recurrent frontal sinusitis       ASPIRIN PO      Take 81 mg by mouth daily        fluconazole 150 MG tablet    DIFLUCAN    1 tablet    Take 1 tablet (150 mg) by mouth once for 1 dose    Yeast infection of the vagina       metoprolol 100 MG 24 hr tablet    TOPROL-XL    135 tablet    Take 0.5 tablets (50 mg) by mouth daily TAKE 1 TABLET BY MOUTH EVERY MORNING AND 1/2 TABLET BY MOUTH EVERY NIGHT AT BEDTIME    Essential hypertension, benign       mometasone 50 MCG/ACT spray    NASONEX    1 g    Spray 2 sprays in nostril daily        Multi-vitamin Tabs tablet      Take 1 tablet by mouth daily        triamterene-hydrochlorothiazide 37.5-25 MG per tablet    MAXZIDE-25    90 tablet    TAKE 1 TABLET BY MOUTH DAILY IN THE MORNING    Benign essential hypertension

## 2017-10-05 ASSESSMENT — ANXIETY QUESTIONNAIRES: GAD7 TOTAL SCORE: 0

## 2017-10-28 DIAGNOSIS — J45.909 BRONCHITIS, ALLERGIC, UNSPECIFIED ASTHMA SEVERITY, UNCOMPLICATED: ICD-10-CM

## 2017-10-31 NOTE — TELEPHONE ENCOUNTER
Ventolin  puffs 108 (90base)  Last office visit: 10/4/17  Last refill: 10/4/17 0 refills    Thank you.

## 2017-11-01 RX ORDER — ALBUTEROL SULFATE 90 UG/1
AEROSOL, METERED RESPIRATORY (INHALATION)
Qty: 18 G | Refills: 0 | Status: SHIPPED | OUTPATIENT
Start: 2017-11-01 | End: 2019-05-29

## 2018-02-05 DIAGNOSIS — I10 BENIGN ESSENTIAL HYPERTENSION: ICD-10-CM

## 2018-02-06 RX ORDER — TRIAMTERENE/HYDROCHLOROTHIAZID 37.5-25 MG
TABLET ORAL
Qty: 90 TABLET | Refills: 1 | Status: SHIPPED | OUTPATIENT
Start: 2018-02-06 | End: 2018-05-03

## 2018-03-23 ENCOUNTER — TELEPHONE (OUTPATIENT)
Dept: INTERNAL MEDICINE | Facility: OTHER | Age: 63
End: 2018-03-23

## 2018-03-23 DIAGNOSIS — I10 BENIGN ESSENTIAL HYPERTENSION: ICD-10-CM

## 2018-03-23 DIAGNOSIS — E78.2 MIXED HYPERLIPIDEMIA: ICD-10-CM

## 2018-03-23 DIAGNOSIS — D47.3 ESSENTIAL THROMBOCYTOSIS (H): Primary | ICD-10-CM

## 2018-03-23 NOTE — TELEPHONE ENCOUNTER
11:48 AM    Reason for Call: Phone Call    Description: Pt called and states that she has a annual physical 04/27/18 and she would like to come in for labs prior     Was an appointment offered for this call? No  If yes : Appointment type              Date    Preferred method for responding to this message: Telephone Call  What is your phone number ?[phone]    If we cannot reach you directly, may we leave a detailed response at the number you provided? Yes    Can this message wait until your PCP/provider returns, if available today? Not applicable, PCP is in     [facility]

## 2018-04-03 PROBLEM — I10 BENIGN ESSENTIAL HYPERTENSION: Status: ACTIVE | Noted: 2018-04-03

## 2018-04-25 DIAGNOSIS — E78.2 MIXED HYPERLIPIDEMIA: ICD-10-CM

## 2018-04-25 DIAGNOSIS — D47.3 ESSENTIAL THROMBOCYTOSIS (H): ICD-10-CM

## 2018-04-25 LAB
ALBUMIN SERPL-MCNC: 3.9 G/DL (ref 3.4–5)
ALP SERPL-CCNC: 96 U/L (ref 40–150)
ALT SERPL W P-5'-P-CCNC: 36 U/L (ref 0–50)
ANION GAP SERPL CALCULATED.3IONS-SCNC: 7 MMOL/L (ref 3–14)
AST SERPL W P-5'-P-CCNC: 24 U/L (ref 0–45)
BILIRUB SERPL-MCNC: 0.6 MG/DL (ref 0.2–1.3)
BUN SERPL-MCNC: 19 MG/DL (ref 7–30)
CALCIUM SERPL-MCNC: 9.2 MG/DL (ref 8.5–10.1)
CHLORIDE SERPL-SCNC: 103 MMOL/L (ref 94–109)
CHOLEST SERPL-MCNC: 170 MG/DL
CK SERPL-CCNC: 76 U/L (ref 30–225)
CO2 SERPL-SCNC: 30 MMOL/L (ref 20–32)
CREAT SERPL-MCNC: 0.7 MG/DL (ref 0.52–1.04)
ERYTHROCYTE [DISTWIDTH] IN BLOOD BY AUTOMATED COUNT: 13.7 % (ref 10–15)
GFR SERPL CREATININE-BSD FRML MDRD: 84 ML/MIN/1.7M2
GLUCOSE SERPL-MCNC: 88 MG/DL (ref 70–99)
HCT VFR BLD AUTO: 43.2 % (ref 35–47)
HDLC SERPL-MCNC: 34 MG/DL
HGB BLD-MCNC: 15 G/DL (ref 11.7–15.7)
LDLC SERPL CALC-MCNC: 96 MG/DL
MCH RBC QN AUTO: 29.6 PG (ref 26.5–33)
MCHC RBC AUTO-ENTMCNC: 34.7 G/DL (ref 31.5–36.5)
MCV RBC AUTO: 85 FL (ref 78–100)
NONHDLC SERPL-MCNC: 136 MG/DL
PLATELET # BLD AUTO: 398 10E9/L (ref 150–450)
POTASSIUM SERPL-SCNC: 3.5 MMOL/L (ref 3.4–5.3)
PROT SERPL-MCNC: 7.7 G/DL (ref 6.8–8.8)
RBC # BLD AUTO: 5.06 10E12/L (ref 3.8–5.2)
SODIUM SERPL-SCNC: 140 MMOL/L (ref 133–144)
TRIGL SERPL-MCNC: 198 MG/DL
WBC # BLD AUTO: 5.6 10E9/L (ref 4–11)

## 2018-04-25 PROCEDURE — 85027 COMPLETE CBC AUTOMATED: CPT | Performed by: INTERNAL MEDICINE

## 2018-04-25 PROCEDURE — 36415 COLL VENOUS BLD VENIPUNCTURE: CPT | Performed by: INTERNAL MEDICINE

## 2018-04-25 PROCEDURE — 82550 ASSAY OF CK (CPK): CPT | Performed by: INTERNAL MEDICINE

## 2018-04-25 PROCEDURE — 80053 COMPREHEN METABOLIC PANEL: CPT | Performed by: INTERNAL MEDICINE

## 2018-04-25 PROCEDURE — 80061 LIPID PANEL: CPT | Performed by: INTERNAL MEDICINE

## 2018-04-27 ENCOUNTER — OFFICE VISIT (OUTPATIENT)
Dept: PEDIATRICS | Facility: OTHER | Age: 63
End: 2018-04-27
Attending: INTERNAL MEDICINE
Payer: COMMERCIAL

## 2018-04-27 VITALS
DIASTOLIC BLOOD PRESSURE: 72 MMHG | SYSTOLIC BLOOD PRESSURE: 130 MMHG | RESPIRATION RATE: 20 BRPM | TEMPERATURE: 97.4 F | HEIGHT: 59 IN | WEIGHT: 168 LBS | BODY MASS INDEX: 33.87 KG/M2

## 2018-04-27 DIAGNOSIS — Z00.00 ROUTINE GENERAL MEDICAL EXAMINATION AT A HEALTH CARE FACILITY: ICD-10-CM

## 2018-04-27 DIAGNOSIS — H81.10 BENIGN PAROXYSMAL POSITIONAL VERTIGO, UNSPECIFIED LATERALITY: ICD-10-CM

## 2018-04-27 DIAGNOSIS — D47.3 ESSENTIAL THROMBOCYTOSIS (H): ICD-10-CM

## 2018-04-27 DIAGNOSIS — E78.1 HYPERTRIGLYCERIDEMIA: ICD-10-CM

## 2018-04-27 DIAGNOSIS — I10 BENIGN ESSENTIAL HYPERTENSION: Primary | ICD-10-CM

## 2018-04-27 LAB
T4 FREE SERPL-MCNC: 1.03 NG/DL (ref 0.76–1.46)
TSH SERPL DL<=0.005 MIU/L-ACNC: 1.35 MU/L (ref 0.4–4)

## 2018-04-27 PROCEDURE — 99396 PREV VISIT EST AGE 40-64: CPT | Performed by: INTERNAL MEDICINE

## 2018-04-27 PROCEDURE — 36415 COLL VENOUS BLD VENIPUNCTURE: CPT | Performed by: INTERNAL MEDICINE

## 2018-04-27 PROCEDURE — 84439 ASSAY OF FREE THYROXINE: CPT | Performed by: INTERNAL MEDICINE

## 2018-04-27 PROCEDURE — 84443 ASSAY THYROID STIM HORMONE: CPT | Performed by: INTERNAL MEDICINE

## 2018-04-27 RX ORDER — METOPROLOL SUCCINATE 100 MG/1
100 TABLET, EXTENDED RELEASE ORAL DAILY
COMMUNITY
Start: 2018-04-27 | End: 2018-05-03

## 2018-04-27 ASSESSMENT — ANXIETY QUESTIONNAIRES
GAD7 TOTAL SCORE: 0
3. WORRYING TOO MUCH ABOUT DIFFERENT THINGS: NOT AT ALL
4. TROUBLE RELAXING: NOT AT ALL
7. FEELING AFRAID AS IF SOMETHING AWFUL MIGHT HAPPEN: NOT AT ALL
2. NOT BEING ABLE TO STOP OR CONTROL WORRYING: NOT AT ALL
1. FEELING NERVOUS, ANXIOUS, OR ON EDGE: NOT AT ALL
6. BECOMING EASILY ANNOYED OR IRRITABLE: NOT AT ALL
5. BEING SO RESTLESS THAT IT IS HARD TO SIT STILL: NOT AT ALL

## 2018-04-27 ASSESSMENT — PAIN SCALES - GENERAL: PAINLEVEL: NO PAIN (0)

## 2018-04-27 NOTE — PROGRESS NOTES
SUBJECTIVE:   CC: Su Land is an 62 year old woman who presents for preventive health visit.     Healthy Habits:    Do you get at least three servings of calcium containing foods daily (dairy, green leafy vegetables, etc.)? yes    Amount of exercise or daily activities, outside of work: 7 day(s) per week, walking 4 miles per day at brisk pace    Problems taking medications regularly No    Medication side effects: No    Have you had an eye exam in the past two years? yes    Do you see a dentist twice per year? yes    Do you have sleep apnea, excessive snoring or daytime drowsiness?no      Answers for HPI/ROS submitted by the patient on 4/25/2018   Annual Exam:  Getting at least 3 servings of Calcium per day:: Yes  Bi-annual eye exam:: Yes  Dental care twice a year:: Yes  Sleep apnea or symptoms of sleep apnea:: None  Diet:: Regular (no restrictions)  Frequency of exercise:: 4-5 days/week  Taking medications regularly:: Yes  Medication side effects:: None  Additional concerns today:: No  PHQ-2 Score: 0  Duration of exercise:: Greater than 60 minutes      Hypertension Follow-up      Outpatient blood pressures are not being checked.    Low Salt Diet: not monitoring salt      BP Readings from Last 6 Encounters:   04/27/18 130/72   10/04/17 116/80   10/02/17 143/80   04/17/17 123/71   03/15/17 128/78   01/07/16 115/68         Today's PHQ-2 Score:   PHQ-2 ( 1999 [employer]) 4/25/2018   Q1: Little interest or pleasure in doing things 0   Q2: Feeling down, depressed or hopeless 0   PHQ-2 Score 0   Q1: Little interest or pleasure in doing things Not at all   Q2: Feeling down, depressed or hopeless Not at all   PHQ-2 Score 0       Abuse: Current or Past(Physical, Sexual or Emotional)- No  Do you feel safe in your environment - No    Social History   Substance Use Topics     Smoking status: Never Smoker     Smokeless tobacco: Never Used     Alcohol use Yes      Comment: occasional     If you drink alcohol do you  typically have >3 drinks per day or >7 drinks per week? No                     Reviewed orders with patient.  Reviewed health maintenance and updated orders accordingly - Yes  Labs reviewed in [facility]    Patient over age 50, mutual decision to screen reflected in health maintenance.    Pertinent mammograms are reviewed under the imaging tab.  History of abnormal Pap smear: NO - age 30-65 PAP every 5 years with negative HPV co-testing recommended    Reviewed and updated as needed this visit by clinical staff  Tobacco  Allergies  Meds  Med Hx  Surg Hx  Fam Hx  Soc Hx         Reviewed and updated as needed this visit by Provider        Past Medical History:   Diagnosis Date     Abnormal weight gain      Acute pharyngitis      Acute sinusitis, unspecified      Conjunctivitis unspecified      Essential thrombocytosis (H)     Confirmed with bone marrow biopsy     H/O mammogram 2014     Hypertension      Other screening mammogram      Pain in joint, lower leg      Routine general medical examination at a health care facility      Special screening for malignant neoplasms, colon      Streptococcal sore throat       Past Surgical History:   Procedure Laterality Date     ABDOMEN SURGERY       x2     APPENDECTOMY       COLONOSCOPY  age 50    Normal     COLONOSCOPY N/A 2017    Procedure: COLONOSCOPY;  WHOLE COLON COLONOSCOPY WITH POLYPECTOMY;  Surgeon: Isabelle Faulkner MD;  Location: [facility]     GYN SURGERY       x2     ORTHOPEDIC SURGERY  2013    left total knee replacement     ORTHOPEDIC SURGERY  10/28/2014    right total knee replacement       ROS:  CONSTITUTIONAL: NEGATIVE for fever, chills, change in weight  INTEGUMENTARY/SKIN: NEGATIVE for worrisome rashes, moles or lesions  EYES: NEGATIVE for vision changes or irritation  EYES: Last eye exam was 7 months ago  ENT: POSITIVE for nasal congestion, postnasal drainage and rhinorrhea-clear and NEGATIVE for ear pain  and sore throat  RESP:  "NEGATIVE for significant cough or SOB  BREAST: NEGATIVE for masses, tenderness or discharge  CV: NEGATIVE for chest pain, palpitations or peripheral edema  GI: NEGATIVE for nausea, abdominal pain, heartburn, or change in bowel habits  : NEGATIVE for unusual urinary or vaginal symptoms. No vaginal bleeding.  MUSCULOSKELETAL: NEGATIVE for significant arthralgias or myalgia  NEURO: POSITIVE for dizziness/lightheadedness and NEGATIVE for numbness or tingling   PSYCHIATRIC: NEGATIVE for changes in mood or affect     OBJECTIVE:   /72 (BP Location: Right arm, Patient Position: Chair, Cuff Size: Adult Large)  Temp 97.4  F (36.3  C) (Tympanic)  Resp 20  Ht 4' 11\" (1.499 m)  Wt 168 lb (76.2 kg)  BMI 33.93 kg/m2  EXAM:  GENERAL: healthy, alert and no distress  EYES: Eyes grossly normal to inspection, PERRL and conjunctivae and sclerae normal  HENT: ear canals and TM's normal, nose and mouth without ulcers or lesions  NECK: no adenopathy, no asymmetry, masses, or scars and thyroid normal to palpation  NECK: no carotid bruits  RESP: lungs clear to auscultation - no rales, rhonchi or wheezes  CV: regular rate and rhythm, normal S1 S2, no S3 or S4, no murmur, click or rub, no peripheral edema and peripheral pulses strong  ABDOMEN: soft, nontender, no hepatosplenomegaly, no masses and bowel sounds normal  ABDOMEN: no bruits heard  MS: no gross musculoskeletal defects noted, no edema  SKIN: no suspicious lesions or rashes  NEURO: Normal strength and tone, mentation intact and speech normal  PSYCH: mentation appears normal, affect normal/bright        Recent Labs   Lab Test  04/25/18   0805  03/15/17   0753  11/10/15   1520  10/01/14   0742   CHOL  170  186  188  180   HDL  34*  48*  47*  46*   LDL  96  105*  114  89   TRIG  198*  167*  133  224*   CHOLHDLRATIO   --    --   4.0  3.9     Recent Labs   Lab Test  04/25/18   0805  03/15/17   0753   NA  140  142   POTASSIUM  3.5  3.8   CHLORIDE  103  105   CO2  30  31 "   ANIONGAP  7  6   GLC  88  94   BUN  19  20   CR  0.70  0.77   GILBERT  9.2  9.4     Lab Results   Component Value Date    AST 24 04/25/2018     Lab Results   Component Value Date    ALT 36 04/25/2018     Lab Results   Component Value Date    ALKPHOS 96 04/25/2018     Lab Results   Component Value Date    PROTTOTAL 7.7 04/25/2018     Recent Labs   Lab Test  04/25/18   0805  03/15/17   0753  11/10/15   1520  10/01/14   0742   BILITOTAL  0.6  0.5  0.7  0.5     Lab Results   Component Value Date    WBC 5.6 04/25/2018     Lab Results   Component Value Date    RBC 5.06 04/25/2018     Lab Results   Component Value Date    HGB 15.0 04/25/2018     Lab Results   Component Value Date    HCT 43.2 04/25/2018     No components found for: MCT  Lab Results   Component Value Date    MCV 85 04/25/2018     Lab Results   Component Value Date    MCH 29.6 04/25/2018     Lab Results   Component Value Date    MCHC 34.7 04/25/2018     Lab Results   Component Value Date    RDW 13.7 04/25/2018     Lab Results   Component Value Date     04/25/2018       ASSESSMENT/PLAN:   (Z00.00) Routine general medical examination at a health care facility  Comment: Patient is up to date on her breast and Cervical cancer screening.  She is up to date on her immunizations  Plan:   Her next PAP will be due in 2020.  He next colonoscopy will be due in 2027     (I10) Benign essential hypertension  (primary encounter diagnosis)  Comment: At goal.  Her renal function is normal  Plan:   She will continue Maxzide 37.5-25 mg daily.  She will continue  metoprolol succinate (TOPROL-XL) 100 MG 24 hr         tablet    (D47.3) Essential thrombocytosis (H)  Comment: Stable.  Plan:   Follow annually and as needed.    (H81.10) Benign paroxysmal positional vertigo, unspecified laterality  Comment: Stable.  Plan:   She will continue Maxzide 37.5-25 mg daily    (E78.1) Hypertriglyceridemia  Comment: Her thyroid labs are normal.  The 10-year ASCVD risk score (Tyler JAIRO Jr, et  "al., 2013) is: 7.2%    Values used to calculate the score:      Age: 62 years      Sex: Female      Is Non- : No      Diabetic: No      Tobacco smoker: No      Systolic Blood Pressure: 130 mmHg      Is BP treated: Yes      HDL Cholesterol: 34 mg/dL      Total Cholesterol: 170 mg/dL      She has a slight to moderate risk of developing cardiovascular disease in the next 10 years, However her LDL is below 100.  Plan:   She will continue ASA 81 mg daily and she will increase the intensity of her walking to bring up her LDL        COUNSELING:   Special attention given to:        Regular exercise       Aspirin Prophylaxsis         reports that she has never smoked. She has never used smokeless tobacco.    Estimated body mass index is 33.93 kg/(m^2) as calculated from the following:    Height as of this encounter: 4' 11\" (1.499 m).    Weight as of this encounter: 168 lb (76.2 kg).   Weight management plan: Discussed healthy diet and exercise guidelines and patient will follow up in 6 months in clinic to re-evaluate.    Counseling Resources:  ATP IV Guidelines  Pooled Cohorts Equation Calculator  Breast Cancer Risk Calculator  FRAX Risk Assessment  ICSI Preventive Guidelines  Dietary Guidelines for Americans, 2010  USDA's MyPlate  ASA Prophylaxis  Lung CA Screening    Rosalino Castillo DO, DO  [facility] [place]    "

## 2018-04-27 NOTE — NURSING NOTE
"Chief Complaint   Patient presents with     Physical       Initial /72 (BP Location: Right arm, Patient Position: Chair, Cuff Size: Adult Large)  Temp 97.4  F (36.3  C) (Tympanic)  Resp 20  Ht 4' 11\" (1.499 m)  Wt 168 lb (76.2 kg)  BMI 33.93 kg/m2 Estimated body mass index is 33.93 kg/(m^2) as calculated from the following:    Height as of this encounter: 4' 11\" (1.499 m).    Weight as of this encounter: 168 lb (76.2 kg).  Medication Reconciliation: complete   Joaquina Palm LPN      "

## 2018-04-27 NOTE — MR AVS SNAPSHOT
After Visit Summary   4/27/2018    Su Land    MRN: [id number]           Patient Information     Date Of Birth          1955        Visit Information        Provider Department      4/27/2018 3:20 PM Rosalino Castillo,  [facility]        Today's Diagnoses     Benign essential hypertension    -  1    Essential thrombocytosis (H)        Benign paroxysmal positional vertigo, unspecified laterality        Routine general medical examination at a health care facility        Essential hypertension, benign        Hypertriglyceridemia          Care Instructions      Preventive Health Recommendations  Female Ages 50 - 64    Yearly exam: See your health care provider every year in order to  o Review health changes.   o Discuss preventive care.    o Review your medicines if your doctor has prescribed any.      Get a Pap test every three years (unless you have an abnormal result and your provider advises testing more often).    If you get Pap tests with HPV test, you only need to test every 5 years, unless you have an abnormal result.     You do not need a Pap test if your uterus was removed (hysterectomy) and you have not had cancer.    You should be tested each year for STDs (sexually transmitted diseases) if you're at risk.     Have a mammogram every 1 to 2 years.    Have a colonoscopy at age 50, or have a yearly FIT test (stool test). These exams screen for colon cancer.      Have a cholesterol test every 5 years, or more often if advised.    Have a diabetes test (fasting glucose) every three years. If you are at risk for diabetes, you should have this test more often.     If you are at risk for osteoporosis (brittle bone disease), think about having a bone density scan (DEXA).    Shots: Get a flu shot each year. Get a tetanus shot every 10 years.    Nutrition:     Eat at least 5 servings of fruits and vegetables each day.    Eat whole-grain bread, whole-wheat pasta and  "brown rice instead of white grains and rice.    Talk to your provider about Calcium and Vitamin D.     Lifestyle    Exercise at least 150 minutes a week (30 minutes a day, 5 days a week). This will help you control your weight and prevent disease.    Limit alcohol to one drink per day.    No smoking.     Wear sunscreen to prevent skin cancer.     See your dentist every six months for an exam and cleaning.    See your eye doctor every 1 to 2 years.            Follow-ups after your visit        Who to contact     If you have questions or need follow up information about today's clinic visit or your schedule please contact [facility] [place] directly at [phone].  Normal or non-critical lab and imaging results will be communicated to you by MyChart, letter or phone within 4 business days after the clinic has received the results. If you do not hear from us within 7 days, please contact the clinic through [employer] or phone. If you have a critical or abnormal lab result, we will notify you by phone as soon as possible.  Submit refill requests through [employer] or call your pharmacy and they will forward the refill request to us. Please allow 3 business days for your refill to be completed.          Additional Information About Your Visit        [employer] Information     NCLC gives you secure access to your electronic health record. If you see a primary care provider, you can also send messages to your care team and make appointments. If you have questions, please call your primary care clinic.  If you do not have a primary care provider, please call [phone] and they will assist you.        Care EveryWhere ID     This is your Care EveryWhere ID. This could be used by other organizations to access your [place] medical records  [id number]        Your Vitals Were     Temperature Respirations Height BMI (Body Mass Index)          97.4  F (36.3  C) (Tympanic) 20 4' 11\" (1.499 m) 33.93 kg/m2         Blood " Pressure from Last 3 Encounters:   04/27/18 130/72   10/04/17 116/80   10/02/17 143/80    Weight from Last 3 Encounters:   04/27/18 168 lb (76.2 kg)   10/04/17 171 lb (77.6 kg)   04/17/17 162 lb 3.2 oz (73.6 kg)              We Performed the Following     T4 free     TSH          Today's Medication Changes          These changes are accurate as of 4/27/18  4:21 PM.  If you have any questions, ask your nurse or doctor.               These medicines have changed or have updated prescriptions.        Dose/Directions    metoprolol succinate 100 MG 24 hr tablet   Commonly known as:  TOPROL-XL   This may have changed:    - how much to take  - additional instructions   Used for:  Essential hypertension, benign   Changed by:  Rosalino Castillo DO        Dose:  100 mg   Take 1 tablet (100 mg) by mouth daily Takes one tablet daily   Refills:  0                Primary Care Provider Office Phone # Fax #    Rosalino Castillo [phone]       [facility] [id number]        Equal Access to Services     [facility]: Hadii richmond rangel hadasho Sodominic, waaxda luqadaha, qaybta kaalmada adeegyajordan, norma pisano . So [facility] [phone].    ATENCIÓN: Si habla español, tiene a ojeda disposición servicios gratuitos de asistencia lingüística. Llame al [phone].    We comply with applicable federal civil rights laws and [place] laws. We do not discriminate on the basis of race, color, national origin, age, disability, sex, sexual orientation, or gender identity.            Thank you!     Thank you for choosing [facility]  for your care. Our goal is always to provide you with excellent care. Hearing back from our patients is one way we can continue to improve our services. Please take a few minutes to complete the written survey that you may receive in the mail after your visit with us. Thank you!             Your Updated Medication List - Protect others  around you: Learn how to safely use, store and throw away your medicines at www.disposemymeds.org.          This list is accurate as of 4/27/18  4:21 PM.  Always use your most recent med list.                   Brand Name Dispense Instructions for use Diagnosis    ASPIRIN PO      Take 81 mg by mouth daily        metoprolol succinate 100 MG 24 hr tablet    TOPROL-XL     Take 1 tablet (100 mg) by mouth daily Takes one tablet daily    Essential hypertension, benign       Multi-vitamin Tabs tablet      Take 1 tablet by mouth daily        triamterene-hydrochlorothiazide 37.5-25 MG per tablet    MAXZIDE-25    90 tablet    TAKE 1 TABLET BY MOUTH DAILY IN THE MORNING    Benign essential hypertension       VENTOLIN  (90 Base) MCG/ACT Inhaler   Generic drug:  albuterol     18 g    INHALE 2 PUFFS INTO THE LUNGS EVERY 6 HOURS AS NEEDED FOR SHORTNESS OF BREATH OR DIFFICULT BREATHING OR WHEEZING    Bronchitis, allergic, unspecified asthma severity, uncomplicated

## 2018-04-28 ASSESSMENT — PATIENT HEALTH QUESTIONNAIRE - PHQ9: SUM OF ALL RESPONSES TO PHQ QUESTIONS 1-9: 0

## 2018-04-28 ASSESSMENT — ANXIETY QUESTIONNAIRES: GAD7 TOTAL SCORE: 0

## 2018-04-30 ENCOUNTER — TRANSFERRED RECORDS (OUTPATIENT)
Dept: HEALTH INFORMATION MANAGEMENT | Facility: CLINIC | Age: 63
End: 2018-04-30

## 2018-05-03 RX ORDER — TRIAMTERENE/HYDROCHLOROTHIAZID 37.5-25 MG
TABLET ORAL
Qty: 90 TABLET | Refills: 3 | Status: SHIPPED | OUTPATIENT
Start: 2018-05-03 | End: 2019-04-18

## 2018-05-03 RX ORDER — METOPROLOL SUCCINATE 100 MG/1
100 TABLET, EXTENDED RELEASE ORAL DAILY
Qty: 90 TABLET | Refills: 3 | Status: SHIPPED | OUTPATIENT
Start: 2018-05-03 | End: 2019-04-18

## 2018-06-04 ENCOUNTER — TRANSFERRED RECORDS (OUTPATIENT)
Dept: HEALTH INFORMATION MANAGEMENT | Facility: CLINIC | Age: 63
End: 2018-06-04

## 2018-10-08 ENCOUNTER — HOSPITAL ENCOUNTER (EMERGENCY)
Facility: HOSPITAL | Age: 63
Discharge: HOME | End: 2018-10-08
Attending: PHYSICIAN ASSISTANT | Admitting: PHYSICIAN ASSISTANT
Payer: COMMERCIAL

## 2018-10-08 VITALS
DIASTOLIC BLOOD PRESSURE: 83 MMHG | TEMPERATURE: 96 F | OXYGEN SATURATION: 99 % | BODY MASS INDEX: 31.61 KG/M2 | HEIGHT: 60 IN | SYSTOLIC BLOOD PRESSURE: 137 MMHG | RESPIRATION RATE: 16 BRPM | WEIGHT: 161 LBS

## 2018-10-08 DIAGNOSIS — M62.838 NECK MUSCLE SPASM: ICD-10-CM

## 2018-10-08 DIAGNOSIS — I10 BENIGN ESSENTIAL HYPERTENSION: ICD-10-CM

## 2018-10-08 PROCEDURE — G0463 HOSPITAL OUTPT CLINIC VISIT: HCPCS

## 2018-10-08 PROCEDURE — 99213 OFFICE O/P EST LOW 20 MIN: CPT | Performed by: PHYSICIAN ASSISTANT

## 2018-10-08 RX ORDER — CYCLOBENZAPRINE HCL 10 MG
TABLET ORAL
Qty: 20 TABLET | Refills: 0 | Status: SHIPPED | OUTPATIENT
Start: 2018-10-08 | End: 2020-02-20

## 2018-10-08 ASSESSMENT — ENCOUNTER SYMPTOMS
NECK STIFFNESS: 1
CONSTITUTIONAL NEGATIVE: 1
PSYCHIATRIC NEGATIVE: 1
NECK PAIN: 1
CARDIOVASCULAR NEGATIVE: 1
NEUROLOGICAL NEGATIVE: 1

## 2018-10-08 NOTE — ED AVS SNAPSHOT
[facility]    [address] [address]    Phone:  [phone]                                       Su Land   MRN: [id number]    Department:  [facility]   Date of Visit:  10/8/2018           Patient Information     Date Of Birth          1955        Your diagnoses for this visit were:     Neck muscle spasm     Benign essential hypertension        You were seen by Darleen William PA.      Follow-up Information     Follow up with Rosalino Castillo DO.    Specialties:  Internal Medicine, Pediatrics    Why:  If symptoms worsen    Contact information:    [address]  [facility] [address] [phone]          Follow up with [facility].    Specialty:  EMERGENCY MEDICINE    Why:  If further concerns develop    Contact information:    [address] [address] [phone]    Additional information:    From [facility]: Take [address]. Turn left at [address]. Turn left at the first stoplight on [address] [place] [address]. At the first stop sign, take a right onto [address]. Take a left into the parking lot and continue through until you reach the North enterance of the building.       From [place]: Take [address]. Take the [address] ramp towards [place]. Turn left onto [address]. Take a slight right onto [address]. Turn left at the first stoplight on [address]. At the first stop sign, take a right onto [address]. Take a left into the parking lot and continue through until you reach the North enterance of the building.       From [place]: Take [address]. Take a right at [address]. At the first stop sign, take a right onto [address]. Take a left into the parking lot and continue through until you reach the North enterance of the building.       Discharge References/Attachments     HIGH BLOOD PRESSURE (HYPERTENSION), DISCHARGE  INSTRUCTIONS (ENGLISH)    NECK SPASM, NO TRAUMA (ENGLISH)         Review of your medicines      START taking        Dose / Directions Last dose taken    cyclobenzaprine 10 MG tablet   Commonly known as:  FLEXERIL   Quantity:  20 tablet        Take half to one tablet every 8 hours as needed for muscle pain   Refills:  0          Our records show that you are taking the medicines listed below. If these are incorrect, please call your family doctor or clinic.        Dose / Directions Last dose taken    ASPIRIN PO   Dose:  81 mg        Take 81 mg by mouth daily   Refills:  0        metoprolol succinate 100 MG 24 hr tablet   Commonly known as:  TOPROL-XL   Dose:  100 mg   Quantity:  90 tablet        Take 1 tablet (100 mg) by mouth daily Takes one tablet daily   Refills:  3        Multi-vitamin Tabs tablet   Dose:  1 tablet        Take 1 tablet by mouth daily   Refills:  0        triamterene-hydrochlorothiazide 37.5-25 MG per tablet   Commonly known as:  MAXZIDE-25   Quantity:  90 tablet        TAKE 1 TABLET BY MOUTH DAILY IN THE MORNING   Refills:  3        VENTOLIN  (90 Base) MCG/ACT inhaler   Quantity:  18 g   Generic drug:  albuterol        INHALE 2 PUFFS INTO THE LUNGS EVERY 6 HOURS AS NEEDED FOR SHORTNESS OF BREATH OR DIFFICULT BREATHING OR WHEEZING   Refills:  0                Prescriptions were sent or printed at these locations (1 Prescription)                   [facility] [address] [place] [address], [place] - [address] AT [facility] [address]   [address] [place] [address]    Telephone:  [phone]   Fax:  [phone]   Hours:                  E-Prescribed (1 of 1)         cyclobenzaprine (FLEXERIL) 10 MG tablet                Orders Needing Specimen Collection     None      Pending Results     No orders found from 10/6/2018 to 10/9/2018.            Pending Culture Results     No orders found from 10/6/2018 to 10/9/2018.            Thank you for choosing [facility]       Thank you for choosing  [place] for your care. Our goal is always to provide you with excellent care. Hearing back from our patients is one way we can continue to improve our services. Please take a few minutes to complete the written survey that you may receive in the mail after you visit with us. Thank you!        [employer] Information     Store[employer] gives you secure access to your electronic health record. If you see a primary care provider, you can also send messages to your care team and make appointments. If you have questions, please call your primary care clinic.  If you do not have a primary care provider, please call [phone] and they will assist you.        Care EveryWhere ID     This is your Care EveryWhere ID. This could be used by other organizations to access your [place] medical records  [id number]        Equal Access to Services     KATHYA MACKAY : Cori Santana, grzegorz ortega, audrey valdez, norma enamorado. So [facility] [phone].    ATENCIÓN: Si habla español, tiene a ojeda disposición servicios gratuitos de asistencia lingüística. Llame al [phone].    We comply with applicable federal civil rights laws and [place] laws. We do not discriminate on the basis of race, color, national origin, age, disability, sex, sexual orientation, or gender identity.            After Visit Summary       This is your record. Keep this with you and show to your community pharmacist(s) and doctor(s) at your next visit.

## 2018-10-08 NOTE — ED AVS SNAPSHOT
[facility]    [address]    [place] [address]    Phone:  [phone]                                       Su Land   MRN: [id number]    Department:  [facility]   Date of Visit:  10/8/2018           After Visit Summary Signature Page     I have received my discharge instructions, and my questions have been answered. I have discussed any challenges I see with this plan with the nurse or doctor.    ..........................................................................................................................................  Patient/Patient Representative Signature      ..........................................................................................................................................  Patient Representative Print Name and Relationship to Patient    ..................................................               ................................................  Date                                   Time    ..........................................................................................................................................  Reviewed by Signature/Title    ...................................................              ..............................................  Date                                               Time          22EPIC Rev 08/18

## 2018-10-08 NOTE — ED PROVIDER NOTES
"  History     Chief Complaint   Patient presents with     Neck Pain     The history is provided by the patient. No  was used.     Su Land is a 62 year old female who \"presents today with c/o headache and neck pain Noticed 2.5 weeks. States her BP has been high since . Takes metoprolol daily, for 6 years. BP normally runs around 125/80.  Rates pain at 5. \" denies any known injury or fall. No vision changes.  The headache is where the posterior neck muscles attach onto the skull. No n/v/f    Problem List:    Patient Active Problem List    Diagnosis Date Noted     Benign essential hypertension 2018     Priority: Medium     Essential thrombocytosis (H) 03/15/2017     Priority: Medium     Routine general medical examination at a health care facility      Priority: Medium     BPPV (benign paroxysmal positional vertigo) 2016     Priority: Medium     Swelling of right knee joint 2015     Priority: Medium     Preop general physical exam 10/06/2014     Priority: Medium        Past Medical History:    Past Medical History:   Diagnosis Date     Abnormal weight gain      Acute pharyngitis      Acute sinusitis, unspecified      Conjunctivitis unspecified      Essential thrombocytosis (H)      H/O mammogram 2014, 2015, 2017, 2018     Hypertension      Other screening mammogram      Pain in joint, lower leg      Routine general medical examination at a health care facility      Special screening for malignant neoplasms, colon      Streptococcal sore throat        Past Surgical History:    Past Surgical History:   Procedure Laterality Date     ABDOMEN SURGERY       x2     APPENDECTOMY       COLONOSCOPY  age 50    Normal     COLONOSCOPY N/A 2017    Procedure: COLONOSCOPY;  WHOLE COLON COLONOSCOPY WITH POLYPECTOMY;  Surgeon: Isabelle Faulkner MD;  Location: [facility]     GYN SURGERY       x2     ORTHOPEDIC SURGERY  2013    left total knee " replacement     ORTHOPEDIC SURGERY  10/28/2014    right total knee replacement       Family History:    Family History   Problem Relation Age of Onset     Hypertension Brother      Other - See Comments Brother      Stroke     Coronary Artery Disease Brother      Hypertension Brother      Other - See Comments Brother      PVD     Other - See Comments Father 51     cerebral aneurysm; cause of death     Hypertension Mother      Thyroid Disease Mother      hypothyroidism     HEART DISEASE Mother      myocardial infarction     Psychotic Disorder Mother      dementia     C.A.D. Mother 70     Alzheimer Disease Mother      Hypertension Sister      C.A.D. Sister      Thyroid Disease Sister      hypothyroid     HEART DISEASE Sister 59     MI      Unknown/Adopted Maternal Grandmother      Unknown/Adopted Maternal Grandfather      Unknown/Adopted Paternal Grandmother      Unknown/Adopted Paternal Grandfather        Social History:  Marital Status:   [2]  Social History   Substance Use Topics     Smoking status: Never Smoker     Smokeless tobacco: Never Used     Alcohol use Yes      Comment: occasional        Medications:      ASPIRIN PO   cyclobenzaprine (FLEXERIL) 10 MG tablet   metoprolol succinate (TOPROL-XL) 100 MG 24 hr tablet   multivitamin, therapeutic with minerals (MULTI-VITAMIN) TABS   triamterene-hydrochlorothiazide (MAXZIDE-25) 37.5-25 MG per tablet   VENTOLIN  (90 BASE) MCG/ACT Inhaler         Review of Systems   Constitutional: Negative.    HENT: Negative.    Cardiovascular: Negative.    Musculoskeletal: Positive for neck pain and neck stiffness.   Neurological: Negative.    Psychiatric/Behavioral: Negative.        Physical Exam   BP: 137/83  Heart Rate: 57  Temp: 96  F (35.6  C)  Resp: 16  Height: 152.4 cm (5')  Weight: 73 kg (161 lb)  SpO2: 99 %      Physical Exam   Constitutional: She is oriented to person, place, and time. She appears well-developed and well-nourished. No distress.   Neck: Normal  range of motion. Neck supple.   Posterior neck muscles have many trigger points/spasms, moderate TTP. This recreates the headache she is speaking of. No e/e/e/e. M/n/v intact.  BLE: No e/e/e/e, +AFROM, 5/5 strength, m/n/v intact   Cardiovascular: Normal rate.    Pulmonary/Chest: Effort normal.   Neurological: She is alert and oriented to person, place, and time.   Skin: She is not diaphoretic.   Psychiatric: She has a normal mood and affect.   Nursing note and vitals reviewed.      ED Course     ED Course     Procedures               Assessments & Plan (with Medical Decision Making)     I have reviewed the nursing notes.    I have reviewed the findings, diagnosis, plan and need for follow up with the patient.      Discharge Medication List as of 10/8/2018 11:18 AM      START taking these medications    Details   cyclobenzaprine (FLEXERIL) 10 MG tablet Take half to one tablet every 8 hours as needed for muscle pain, Disp-20 tablet, R-0, E-Prescribe             Final diagnoses:   Neck muscle spasm   Benign essential hypertension             Patient verbally educated and given appropriate education sheets for the diagnoses and has no questions.  Take medications as directed.   Follow up with your Primary Care provider or get a message, if symptoms increase or if further concerns develop, return to the ER  Darleen William Certified  Physician Assistant  10/8/2018  1:59 PM  URGENT CARE CLINIC      10/8/2018   [facility]     Darleen William PA  10/08/18 [id number]

## 2018-10-08 NOTE — ED TRIAGE NOTES
Pt presents today with c/o headache and neck pain Noticed 2.5 weeks. States her BP has been high since 9/28. Takes metoprolol daily, for 6 years. BP normally runs around 125/80.  Rates pain at 5.

## 2018-10-15 ENCOUNTER — OFFICE VISIT (OUTPATIENT)
Dept: PEDIATRICS | Facility: OTHER | Age: 63
End: 2018-10-15
Attending: INTERNAL MEDICINE
Payer: COMMERCIAL

## 2018-10-15 VITALS
HEART RATE: 60 BPM | TEMPERATURE: 97.5 F | OXYGEN SATURATION: 98 % | RESPIRATION RATE: 20 BRPM | DIASTOLIC BLOOD PRESSURE: 76 MMHG | SYSTOLIC BLOOD PRESSURE: 132 MMHG

## 2018-10-15 DIAGNOSIS — J02.9 SORE THROAT: Primary | ICD-10-CM

## 2018-10-15 DIAGNOSIS — M54.2 NECK PAIN: ICD-10-CM

## 2018-10-15 DIAGNOSIS — M99.01 SOMATIC DYSFUNCTION OF CERVICAL REGION: ICD-10-CM

## 2018-10-15 PROCEDURE — 99213 OFFICE O/P EST LOW 20 MIN: CPT | Mod: 25 | Performed by: INTERNAL MEDICINE

## 2018-10-15 PROCEDURE — 98925 OSTEOPATH MANJ 1-2 REGIONS: CPT | Performed by: INTERNAL MEDICINE

## 2018-10-15 ASSESSMENT — PAIN SCALES - GENERAL: PAINLEVEL: SEVERE PAIN (6)

## 2018-10-15 ASSESSMENT — ANXIETY QUESTIONNAIRES
7. FEELING AFRAID AS IF SOMETHING AWFUL MIGHT HAPPEN: NOT AT ALL
2. NOT BEING ABLE TO STOP OR CONTROL WORRYING: NOT AT ALL
3. WORRYING TOO MUCH ABOUT DIFFERENT THINGS: NOT AT ALL
1. FEELING NERVOUS, ANXIOUS, OR ON EDGE: NOT AT ALL
6. BECOMING EASILY ANNOYED OR IRRITABLE: NOT AT ALL
5. BEING SO RESTLESS THAT IT IS HARD TO SIT STILL: NOT AT ALL
GAD7 TOTAL SCORE: 0

## 2018-10-15 ASSESSMENT — PATIENT HEALTH QUESTIONNAIRE - PHQ9: 5. POOR APPETITE OR OVEREATING: NOT AT ALL

## 2018-10-15 NOTE — NURSING NOTE
Chief Complaint   Patient presents with     ER F/U     Pharyngitis       Initial /76 (BP Location: Right arm, Patient Position: Chair, Cuff Size: Adult Large)  Pulse 60  Temp 97.5  F (36.4  C) (Tympanic)  Resp 20  SpO2 98% Estimated body mass index is 31.44 kg/(m^2) as calculated from the following:    Height as of 10/8/18: 5' (1.524 m).    Weight as of 10/8/18: 161 lb (73 kg).  Medication Reconciliation: complete    Joaquina Palm LPN

## 2018-10-15 NOTE — PROGRESS NOTES
SUBJECTIVE:   Su Land is a 62 year old female who presents to clinic today for the following health issues:      ED/UC Followup:    Facility:  [facility] [place]  Date of visit: 10-8-18  Reason for visit: Neck spasms  Current Status: Still having spasms, cannot turn neck very far. Headache in back of head.    She denies any precipitating event to her neck pain.        Sore throat      Duration: 3-4 days    Description (location/character/radiation): Sore throat, no fevers    Intensity:  moderate    Accompanying signs and symptoms: Headache    History (similar episodes/previous evaluation): None    Precipitating or alleviating factors: None    Therapies tried and outcome: Aleve      She denies any nausea.    -------------------------------------    Problem list and histories reviewed & adjusted, as indicated.  Additional history: as documented    Patient Active Problem List   Diagnosis     Preop general physical exam     Swelling of right knee joint     BPPV (benign paroxysmal positional vertigo)     Routine general medical examination at a health care facility     Essential thrombocytosis (H)     Benign essential hypertension     Past Surgical History:   Procedure Laterality Date     ABDOMEN SURGERY       x2     APPENDECTOMY       COLONOSCOPY  age 50    Normal     COLONOSCOPY N/A 2017    Procedure: COLONOSCOPY;  WHOLE COLON COLONOSCOPY WITH POLYPECTOMY;  Surgeon: Isabelle Faulkner MD;  Location: [facility]     GYN SURGERY       x2     ORTHOPEDIC SURGERY  2013    left total knee replacement     ORTHOPEDIC SURGERY  10/28/2014    right total knee replacement       Social History   Substance Use Topics     Smoking status: Never Smoker     Smokeless tobacco: Never Used     Alcohol use Yes      Comment: occasional     Family History   Problem Relation Age of Onset     Hypertension Brother      Other - See Comments Brother      Stroke     Coronary Artery Disease Brother      Hypertension Brother       Other - See Comments Brother      PVD     Other - See Comments Father 51     cerebral aneurysm; cause of death     Hypertension Mother      Thyroid Disease Mother      hypothyroidism     HEART DISEASE Mother      myocardial infarction     Psychotic Disorder Mother      dementia     C.A.D. Mother 70     Alzheimer Disease Mother      Hypertension Sister      C.A.D. Sister      Thyroid Disease Sister      hypothyroid     HEART DISEASE Sister 59     MI      Unknown/Adopted Maternal Grandmother      Unknown/Adopted Maternal Grandfather      Unknown/Adopted Paternal Grandmother      Unknown/Adopted Paternal Grandfather            Reviewed and updated as needed this visit by clinical staff  Tobacco  Allergies  Meds  Med Hx  Surg Hx  Fam Hx  Soc Hx      Reviewed and updated as needed this visit by Provider         ROS:  Patient reports posterior neck pain which is worse on the left.  She has a posterior headache without any vision changes.  She denies any weakness or tingling or changes in sensation in the upper extremities. She denies any dizziness.  She denies any abdominal pain or nausea.      OBJECTIVE:     /76 (BP Location: Right arm, Patient Position: Chair, Cuff Size: Adult Large)  Pulse 60  Temp 97.5  F (36.4  C) (Tympanic)  Resp 20  SpO2 98%  There is no height or weight on file to calculate BMI.   GENERAL: healthy, alert and no distress  HENT: ear canals and TM's normal, nose and mouth without ulcers or lesions  NECK: no adenopathy, no asymmetry, masses, or scars and thyroid normal to palpation  MS: no gross musculoskeletal defects noted, no edema  NEURO: Normal strength and tone, mentation intact and speech normal  OA motion is normal.    Head rotation:    Active rotation is 40 degrees to the left and 45 degrees to the right.    There is prominence of the C2, C3, C4  and C5 vertebra on the left with poor translation to the right.    The cervical vertebra C2, C3, C4 and C5 are side bent right and  rotated right      There are prominent muscle spasms on left.      Diagnostic Test Results:  none     ASSESSMENT/PLAN:   (J02.9) Sore throat  (primary encounter diagnosis)  Comment: Secondary to post nasal drip from her allergies.,  Plan:   Consider cetirizine 10 mg.    (M54.2) Neck pain  Comment: She is likely having neck pain from sleeping oddly.  She has mislaigned vertebra.  Plan:   OMT of the cervical spine.  She will take 600 mg of ibuprofen 2 times daily for the next two days.    (M99.01) Somatic dysfunction of cervical region  Comment: She will need at aleat one treatment given that she is having dental work done mid week  Plan:   OMT of the cervical spine today and in 4 days.      Passive head rotation to the left is at 40 degrees and to the right is at 45 degrees.  Cervical vertebra C2, C3, C4, and C5 are rotated right and side bent right.  C2, C3, C4, and  C5 vertebra have poor translation to the right.    Patient was placed into the supine position.  Myofascial release was performed on the cervical region with mild cephalad traction using the occiput as leverage.  This was performed for 3-5 minutes.  Next the C2, C3, C4, and C5 levels were engaged with applied pressure over the left side putting the head into left side bending while the opposite hand stabilized the head.  The patient applied a counter force in the opposite direction as the examiner resisted the motion.  The counter motion was applied for threes sets of 5 seconds at the level of C2, C4, and C5 with the examiner engaging the new barrier in left side bending of the head and neck after each set. Each vertebra described was treated individually.  No treatment was needed for C3 as this corrected with the more cephalad vertebra.  The treatment was completed with myofascial release as described above.    Post treatment assessment showed the head rotation to be 45 degrees to the left and 45 degrees to the right.  The cervical vertebra motion at  levels C2 thru C7 showed equal translation to both the left and the right.  There remained some sluggish translation to the right at C5.  The patient reported that his/her neck pain had improved.         FUTURE APPOINTMENTS:       - Follow-up visit in 4 days for OMT.    Rosalino Castillo DO,   [facility]

## 2018-10-15 NOTE — MR AVS SNAPSHOT
After Visit Summary   10/15/2018    Su Land    MRN: [id number]           Patient Information     Date Of Birth          1955        Visit Information        Provider Department      10/15/2018 10:00 AM Rosalino Castillo DO [facility]        Today's Diagnoses     Sore throat    -  1    Neck pain        Somatic dysfunction of cervical region           Follow-ups after your visit        Your next 10 appointments already scheduled     Oct 19, 2018 11:20 AM CDT   (Arrive by 11:00 AM)   SHORT with Rosalino Castillo DO   [facility]    [address] [place] [address]   [phone]              Who to contact     If you have questions or need follow up information about today's clinic visit or your schedule please contact [facility] directly at [phone].  Normal or non-critical lab and imaging results will be communicated to you by MyChart, letter or phone within 4 business days after the clinic has received the results. If you do not hear from us within 7 days, please contact the clinic through [employer] or phone. If you have a critical or abnormal lab result, we will notify you by phone as soon as possible.  Submit refill requests through [employer] or call your pharmacy and they will forward the refill request to us. Please allow 3 business days for your refill to be completed.          Additional Information About Your Visit        MyChart Information     [employer] gives you secure access to your electronic health record. If you see a primary care provider, you can also send messages to your care team and make appointments. If you have questions, please call your primary care clinic.  If you do not have a primary care provider, please call [phone] and they will assist you.        Care EveryWhere ID     This is your Care EveryWhere ID. This could be used by other  organizations to access your [place] medical records  [id number]        Your Vitals Were     Pulse Temperature Respirations Pulse Oximetry          60 97.5  F (36.4  C) (Tympanic) 20 98%         Blood Pressure from Last 3 Encounters:   10/15/18 132/76   10/08/18 137/83   04/27/18 130/72    Weight from Last 3 Encounters:   10/08/18 161 lb (73 kg)   04/27/18 168 lb (76.2 kg)   10/04/17 171 lb (77.6 kg)              Today, you had the following     No orders found for display       Primary Care Provider Office Phone # Fax #    Rosalinoberonica Castillo, [phone]       [facility] [place] [address]        Equal Access to Services     KATHYA MACKAY : Cori donahueo Soomaali, waaxda luqadaha, qaybta kaalmada adeegyada, norma pisano . [facility] [phone].    ATENCIÓN: Si habla español, tiene a ojeda disposición servicios gratuitos de asistencia lingüística. Llame al [phone].    We comply with applicable federal civil rights laws and [place] laws. We do not discriminate on the basis of race, color, national origin, age, disability, sex, sexual orientation, or gender identity.            Thank you!     Thank you for choosing [facility]  for your care. Our goal is always to provide you with excellent care. Hearing back from our patients is one way we can continue to improve our services. Please take a few minutes to complete the written survey that you may receive in the mail after your visit with us. Thank you!             Your Updated Medication List - Protect others around you: Learn how to safely use, store and throw away your medicines at www.disposemymeds.org.          This list is accurate as of 10/15/18 10:46 AM.  Always use your most recent med list.                   Brand Name Dispense Instructions for use Diagnosis    ASPIRIN PO      Take 81 mg by mouth daily        cyclobenzaprine 10 MG tablet    FLEXERIL    20 tablet     Take half to one tablet every 8 hours as needed for muscle pain        metoprolol succinate 100 MG 24 hr tablet    TOPROL-XL    90 tablet    Take 1 tablet (100 mg) by mouth daily Takes one tablet daily        Multi-vitamin Tabs tablet      Take 1 tablet by mouth daily        triamterene-hydrochlorothiazide 37.5-25 MG per tablet    MAXZIDE-25    90 tablet    TAKE 1 TABLET BY MOUTH DAILY IN THE MORNING    Benign essential hypertension       VENTOLIN  (90 Base) MCG/ACT inhaler   Generic drug:  albuterol     18 g    INHALE 2 PUFFS INTO THE LUNGS EVERY 6 HOURS AS NEEDED FOR SHORTNESS OF BREATH OR DIFFICULT BREATHING OR WHEEZING    Bronchitis, allergic, unspecified asthma severity, uncomplicated

## 2018-10-16 ASSESSMENT — ANXIETY QUESTIONNAIRES: GAD7 TOTAL SCORE: 0

## 2018-10-16 ASSESSMENT — PATIENT HEALTH QUESTIONNAIRE - PHQ9: SUM OF ALL RESPONSES TO PHQ QUESTIONS 1-9: 0

## 2018-10-19 ENCOUNTER — OFFICE VISIT (OUTPATIENT)
Dept: PEDIATRICS | Facility: OTHER | Age: 63
End: 2018-10-19
Attending: INTERNAL MEDICINE
Payer: COMMERCIAL

## 2018-10-19 VITALS
SYSTOLIC BLOOD PRESSURE: 138 MMHG | DIASTOLIC BLOOD PRESSURE: 76 MMHG | TEMPERATURE: 97.4 F | OXYGEN SATURATION: 99 % | HEART RATE: 59 BPM

## 2018-10-19 DIAGNOSIS — M99.01 SOMATIC DYSFUNCTION OF CERVICAL REGION: Primary | ICD-10-CM

## 2018-10-19 PROCEDURE — 98925 OSTEOPATH MANJ 1-2 REGIONS: CPT | Performed by: INTERNAL MEDICINE

## 2018-10-19 ASSESSMENT — PAIN SCALES - GENERAL: PAINLEVEL: EXTREME PAIN (8)

## 2018-10-19 NOTE — MR AVS SNAPSHOT
After Visit Summary   10/19/2018    Su Land    MRN: [id number]           Patient Information     Date Of Birth          1955        Visit Information        Provider Department      10/19/2018 11:20 AM Rosalino Castillo DO [facility]         Follow-ups after your visit        Your next 10 appointments already scheduled     Oct 22, 2018 12:20 PM CDT   (Arrive by 12:00 PM)   SHORT with Rosalino Castillo DO   [facility] )    [address] [place] [address]   [phone]              Who to contact     If you have questions or need follow up information about today's clinic visit or your schedule please contact [facility] directly at [phone].  Normal or non-critical lab and imaging results will be communicated to you by MyChart, letter or phone within 4 business days after the clinic has received the results. If you do not hear from us within 7 days, please contact the clinic through MyChart or phone. If you have a critical or abnormal lab result, we will notify you by phone as soon as possible.  Submit refill requests through [employer] or call your pharmacy and they will forward the refill request to us. Please allow 3 business days for your refill to be completed.          Additional Information About Your Visit        MyChart Information     [employer] gives you secure access to your electronic health record. If you see a primary care provider, you can also send messages to your care team and make appointments. If you have questions, please call your primary care clinic.  If you do not have a primary care provider, please call [phone] and they will assist you.        Care EveryWhere ID     This is your Care EveryWhere ID. This could be used by other organizations to access your [place] medical records  [id number]        Your Vitals Were     Pulse Temperature  Pulse Oximetry             59 97.4  F (36.3  C) (Tympanic) 99%          Blood Pressure from Last 3 Encounters:   10/19/18 138/76   10/15/18 132/76   10/08/18 137/83    Weight from Last 3 Encounters:   10/08/18 161 lb (73 kg)   04/27/18 168 lb (76.2 kg)   10/04/17 171 lb (77.6 kg)              Today, you had the following     No orders found for display       Primary Care Provider Office Phone # Fax #    Rosalinoasif Castillo,  [phone] [address] [facility] [address]        Equal Access to Services     PAN MACKAY : Hadii richmond donahueo Sodominic, waaxda luqadaha, qaybta kaalmada courtney, norma pisano . [facility] [phone].    ATENCIÓN: Si habla español, tiene a ojeda disposición servicios gratuitos de asistencia lingüística. Llame al [phone].    We comply with applicable federal civil rights laws and [place] laws. We do not discriminate on the basis of race, color, national origin, age, disability, sex, sexual orientation, or gender identity.            Thank you!     Thank you for choosing [facility]  for your care. Our goal is always to provide you with excellent care. Hearing back from our patients is one way we can continue to improve our services. Please take a few minutes to complete the written survey that you may receive in the mail after your visit with us. Thank you!             Your Updated Medication List - Protect others around you: Learn how to safely use, store and throw away your medicines at www.disposemymeds.org.          This list is accurate as of 10/19/18 12:11 PM.  Always use your most recent med list.                   Brand Name Dispense Instructions for use Diagnosis    ASPIRIN PO      Take 81 mg by mouth daily        cyclobenzaprine 10 MG tablet    FLEXERIL    20 tablet    Take half to one tablet every 8 hours as needed for muscle pain        metoprolol succinate 100 MG 24 hr tablet    TOPROL-XL     90 tablet    Take 1 tablet (100 mg) by mouth daily Takes one tablet daily        Multi-vitamin Tabs tablet      Take 1 tablet by mouth daily        triamterene-hydrochlorothiazide 37.5-25 MG per tablet    MAXZIDE-25    90 tablet    TAKE 1 TABLET BY MOUTH DAILY IN THE MORNING    Benign essential hypertension       VENTOLIN  (90 Base) MCG/ACT inhaler   Generic drug:  albuterol     18 g    INHALE 2 PUFFS INTO THE LUNGS EVERY 6 HOURS AS NEEDED FOR SHORTNESS OF BREATH OR DIFFICULT BREATHING OR WHEEZING    Bronchitis, allergic, unspecified asthma severity, uncomplicated

## 2018-10-19 NOTE — NURSING NOTE
Chief Complaint   Patient presents with     Manipulation       Initial /76 (BP Location: Right arm, Patient Position: Sitting, Cuff Size: Adult Regular)  Pulse 59  Temp 97.4  F (36.3  C) (Tympanic)  SpO2 99% Estimated body mass index is 31.44 kg/(m^2) as calculated from the following:    Height as of 10/8/18: 5' (1.524 m).    Weight as of 10/8/18: 161 lb (73 kg).  Medication Reconciliation: complete     Patient reported received Influenza vaccine at [facility] on 10/15/18. Health maintenance updated.    Johnna Greenberg LPN

## 2018-10-22 ENCOUNTER — OFFICE VISIT (OUTPATIENT)
Dept: PEDIATRICS | Facility: OTHER | Age: 63
End: 2018-10-22
Attending: INTERNAL MEDICINE
Payer: COMMERCIAL

## 2018-10-22 VITALS
DIASTOLIC BLOOD PRESSURE: 70 MMHG | OXYGEN SATURATION: 98 % | HEART RATE: 64 BPM | SYSTOLIC BLOOD PRESSURE: 130 MMHG | RESPIRATION RATE: 20 BRPM | TEMPERATURE: 98.1 F

## 2018-10-22 DIAGNOSIS — M99.01 SOMATIC DYSFUNCTION OF CERVICAL REGION: Primary | ICD-10-CM

## 2018-10-22 PROCEDURE — 98925 OSTEOPATH MANJ 1-2 REGIONS: CPT | Performed by: INTERNAL MEDICINE

## 2018-10-22 ASSESSMENT — PAIN SCALES - GENERAL: PAINLEVEL: MODERATE PAIN (5)

## 2018-10-22 NOTE — PROGRESS NOTES
SUBJECTIVE:   Su Land is a 63 year old female who presents to clinic today for the following health issues:      HPI  Patient is a 62 yo female who presents for her neck pain and repeat OMT.  She reports that her neck pain has no changes.  She has not noticed any changes in her ROM.  Patient denies any posterior headache or any vision changes.  She denies any weakness or tingling or changes in sensation in the upper extremities. She denies any dizziness.  She denies any abdominal pain or nausea.        Problem list and histories reviewed & adjusted, as indicated.  Additional history: as documented        Patient Active Problem List   Diagnosis     Preop general physical exam     Swelling of right knee joint     BPPV (benign paroxysmal positional vertigo)     Routine general medical examination at a health care facility     Essential thrombocytosis (H)     Benign essential hypertension     Somatic dysfunction of cervical region     Neck pain     Past Surgical History:   Procedure Laterality Date     ABDOMEN SURGERY       x2     APPENDECTOMY       COLONOSCOPY  age 50    Normal     COLONOSCOPY N/A 2017    Procedure: COLONOSCOPY;  WHOLE COLON COLONOSCOPY WITH POLYPECTOMY;  Surgeon: Isabelle Faulkner MD;  Location: [facility]     GYN SURGERY       x2     ORTHOPEDIC SURGERY  2013    left total knee replacement     ORTHOPEDIC SURGERY  10/28/2014    right total knee replacement       Social History   Substance Use Topics     Smoking status: Never Smoker     Smokeless tobacco: Never Used     Alcohol use Yes      Comment: occasional     Family History   Problem Relation Age of Onset     Hypertension Brother      Other - See Comments Brother      Stroke     Coronary Artery Disease Brother      Hypertension Brother      Other - See Comments Brother      PVD     Other - See Comments Father 51     cerebral aneurysm; cause of death     Hypertension Mother      Thyroid Disease Mother       hypothyroidism     HEART DISEASE Mother      myocardial infarction     Psychotic Disorder Mother      dementia     C.A.D. Mother 70     Alzheimer Disease Mother      Hypertension Sister      C.A.D. Sister      Thyroid Disease Sister      hypothyroid     HEART DISEASE Sister 59     MI      Unknown/Adopted Maternal Grandmother      Unknown/Adopted Maternal Grandfather      Unknown/Adopted Paternal Grandmother      Unknown/Adopted Paternal Grandfather            ROS:  Constitutional, HEENT, cardiovascular, pulmonary, gi and gu systems are negative, except as otherwise noted.    OBJECTIVE:     /76 (BP Location: Right arm, Patient Position: Sitting, Cuff Size: Adult Regular)  Pulse 59  Temp 97.4  F (36.3  C) (Tympanic)  SpO2 99%  There is no height or weight on file to calculate BMI.  GENERAL: healthy, alert and no distress  MS:  OA motion is normal.    Head rotation:    Active rotation is 30 degrees to the left and 40 degrees to the right.    There is prominence of the C2, C4 and C5 vertebra on the left with poor translation to the right.    The cervical vertebra C2, C4 and C5  are side bent right and rotated right.      Diagnostic Test Results:  none     ASSESSMENT/PLAN:   (M99.01) Somatic dysfunction of cervical region  (primary encounter diagnosis)  Comment: She has lost some ROM likely due to inflammations from treatment.  Plan:   OMT of the cervical spine.    Procedure note:  Passive head rotation to the left is at 30 degrees and to the right is at 45 degrees.  Cervical vertebra C2, C4, and C5 are rotated right and side bent right.  C2, C4, and  C5 vertebra have poor translation to the right.     Patient was placed into the supine position.  Myofascial release was performed on the cervical region with mild cephalad traction using the occiput as leverage.  This was performed for 3-5 minutes.  Next the C2, C4, and C5 levels were engaged with applied pressure over the left side putting the head into left side  bending while the opposite hand stabilized the head.  The patient applied a counter force in the opposite direction as the examiner resisted the motion.  The counter motion was applied for threes sets of 5 seconds at the level of C2, C4, and C5 with the examiner engaging the new barrier in left side bending of the head and neck after each set. Each vertebra described was treated individually.    The treatment was completed with myofascial release as described above. Myofacial release was repeated for 5 minutes follow by stretching of the right trapezius myofascial tissue with the examiner's right hand on the patient's anterior left shoulder and the right arm creating and cradle for the patient's head.  The examiner's left hand was placed on the right parietal region of the patient's head guiding the patient into left side bending of the head and neck.  This was held for 2 minutes.  The same technique was applied to the head and neck for stretching the left trapezius.       Post treatment assessment showed the head rotation to be 35 degrees to the left and 45 degrees to the right.  The cervical vertebra motion at levels C2 thru C7 showed equal translation to both the left and the right.  There remained some sluggish translation to the right at C5.  The patient reported that his/her neck pain had improved.        FUTURE APPOINTMENTS:       - Follow-up visit in 3 days for KATHLEEN Castillo DO, DO  [facility]

## 2018-10-22 NOTE — PROGRESS NOTES
SUBJECTIVE:   Su Land is a 63 year old female who presents to clinic today for the following health issues:      HPI  Patient is a 64 yo female who presents for her neck pain and repeat OMT.  She reports that her neck pain after herOMT was worse and sha had a headaches which resolved with a nap.  She has noticed improvement in her ROM.  Patient denies any posterior headache or any vision changes.  She denies any weakness or tingling or changes in sensation in the upper extremities. She denies any dizziness.  She denies any abdominal pain or nausea.  She reports that her headaches are resolved.      Problem list and histories reviewed & adjusted, as indicated.  Additional history: as documented        Patient Active Problem List   Diagnosis     Preop general physical exam     Swelling of right knee joint     BPPV (benign paroxysmal positional vertigo)     Routine general medical examination at a health care facility     Essential thrombocytosis (H)     Benign essential hypertension     Somatic dysfunction of cervical region     Neck pain     Past Surgical History:   Procedure Laterality Date     ABDOMEN SURGERY       x2     APPENDECTOMY       COLONOSCOPY  age 50    Normal     COLONOSCOPY N/A 2017    Procedure: COLONOSCOPY;  WHOLE COLON COLONOSCOPY WITH POLYPECTOMY;  Surgeon: Isabelle Faulkner MD;  Location: [facility]     GYN SURGERY       x2     ORTHOPEDIC SURGERY  2013    left total knee replacement     ORTHOPEDIC SURGERY  10/28/2014    right total knee replacement       Social History   Substance Use Topics     Smoking status: Never Smoker     Smokeless tobacco: Never Used     Alcohol use Yes      Comment: occasional     Family History   Problem Relation Age of Onset     Hypertension Brother      Other - See Comments Brother      Stroke     Coronary Artery Disease Brother      Hypertension Brother      Other - See Comments Brother      PVD     Other - See Comments Father 51      cerebral aneurysm; cause of death     Hypertension Mother      Thyroid Disease Mother      hypothyroidism     HEART DISEASE Mother      myocardial infarction     Psychotic Disorder Mother      dementia     C.A.D. Mother 70     Alzheimer Disease Mother      Hypertension Sister      C.A.D. Sister      Thyroid Disease Sister      hypothyroid     HEART DISEASE Sister 59     MI      Unknown/Adopted Maternal Grandmother      Unknown/Adopted Maternal Grandfather      Unknown/Adopted Paternal Grandmother      Unknown/Adopted Paternal Grandfather            ROS:  Constitutional, HEENT, cardiovascular, pulmonary, gi and gu systems are negative, except as otherwise noted.    OBJECTIVE:     There were no vitals taken for this visit.  There is no height or weight on file to calculate BMI.  GENERAL: healthy, alert and no distress  MS:  OA motion is normal.    Head rotation:    Active rotation is 45 degrees to the left and 50 degrees to the right.    There is prominence of the C5 vertebra on the left with poor translation to the right.    The cervical vertebra C5  are side bent right and rotated right.      Diagnostic Test Results:  none     ASSESSMENT/PLAN:   (M99.01) Somatic dysfunction of cervical region  (primary encounter diagnosis)  Comment: Her headaches are resolved and her ROM is better today.  Plan:   OMT of the cervical spine.    Procedure note:  Passive head rotation to the left is at 45 degrees and to the right is at 50degrees.  Cervical vertebra C5 is rotated right and side bent right.  C5 vertebra have poor translation to the right.     Patient was placed into the supine position.  Myofascial release was performed on the cervical region with mild cephalad traction using the occiput as leverage.  This was performed for 3-5 minutes.  Next the C5 level was engaged with applied pressure over the left side putting the head into left side bending while the opposite hand stabilized the head.  The patient applied a counter  force in the opposite direction as the examiner resisted the motion.  The counter motion was applied for threes sets of 5 seconds at the level of C5 with the examiner engaging the new barrier in left side bending of the head and neck after each set.     Myofacial release was repeated for 5 minutes follow by stretching of the right trapezius myofascial tissue with the examiner's right hand on the patient's anterior left shoulder and the right arm creating and cradle for the patient's head.  The examiner's left hand was placed on the right parietal region of the patient's head guiding the patient into left side bending of the head and neck.  This was held for 2 minutes.  The same technique was applied to the head and neck for stretching the left trapezius. The treatment was completed with myofascial release as described above.        Post treatment assessment showed the head rotation to be 50 degrees to the left and 50 degrees to the right.  The cervical vertebra motion at levels C2 thru C7 showed equal translation to both the left and the right.  There remained some sluggish translation to the right at C5.  The patient reported that his/her neck pain had improved.        FUTURE APPOINTMENTS:       - Follow-up visit in 2 days for KATHLEEN Castillo DO, DO  [facility]

## 2018-10-22 NOTE — PROGRESS NOTES
HPI    Past Medical History:   Diagnosis Date     Abnormal weight gain      Acute pharyngitis      Acute sinusitis, unspecified      Conjunctivitis unspecified      Essential thrombocytosis (H)     Confirmed with bone marrow biopsy     H/O mammogram 2014, 2015, 2017, 2018     Hypertension      Other screening mammogram      Pain in joint, lower leg      Routine general medical examination at a health care facility      Special screening for malignant neoplasms, colon      Streptococcal sore throat      Past Surgical History:   Procedure Laterality Date     ABDOMEN SURGERY       x2     APPENDECTOMY       COLONOSCOPY  age 50    Normal     COLONOSCOPY N/A 2017    Procedure: COLONOSCOPY;  WHOLE COLON COLONOSCOPY WITH POLYPECTOMY;  Surgeon: Isabelle Faulkner MD;  Location: [facility]     GYN SURGERY       x2     ORTHOPEDIC SURGERY  2013    left total knee replacement     ORTHOPEDIC SURGERY  10/28/2014    right total knee replacement       ROS      Physical Exam

## 2018-10-22 NOTE — NURSING NOTE
Chief Complaint   Patient presents with     Manipulation       Initial /70 (BP Location: Left arm, Patient Position: Chair, Cuff Size: Adult Large)  Pulse 64  Temp 98.1  F (36.7  C) (Tympanic)  Resp 20  SpO2 98% Estimated body mass index is 31.44 kg/(m^2) as calculated from the following:    Height as of 10/8/18: 5' (1.524 m).    Weight as of 10/8/18: 161 lb (73 kg).  Medication Reconciliation: complete    Joaquina Palm LPN

## 2018-10-22 NOTE — MR AVS SNAPSHOT
After Visit Summary   10/22/2018    Su Land    MRN: [id number]           Patient Information     Date Of Birth          1955        Visit Information        Provider Department      10/22/2018 12:20 PM Rosalino Castillo DO [facility]        Today's Diagnoses     Somatic dysfunction of cervical region    -  1       Follow-ups after your visit        Follow-up notes from your care team     Return in about 2 years (around 10/22/2020) for OMT.      Your next 10 appointments already scheduled     Oct 24, 2018  1:00 PM CDT   (Arrive by 12:40 PM)   SHORT with Rosalino Castillo DO   [facility] ([facility] )    [address]  [place] [address]   [phone]              Who to contact     If you have questions or need follow up information about today's clinic visit or your schedule please contact [facility] directly at [phone].  Normal or non-critical lab and imaging results will be communicated to you by Cargomatichart, letter or phone within 4 business days after the clinic has received the results. If you do not hear from us within 7 days, please contact the clinic through Mill[employer] or phone. If you have a critical or abnormal lab result, we will notify you by phone as soon as possible.  Submit refill requests through [employer] or call your pharmacy and they will forward the refill request to us. Please allow 3 business days for your refill to be completed.          Additional Information About Your Visit        Cargomatichart Information     [employer] gives you secure access to your electronic health record. If you see a primary care provider, you can also send messages to your care team and make appointments. If you have questions, please call your primary care clinic.  If you do not have a primary care provider, please call [phone] and they will assist you.        Care EveryWhere ID     This  is your Care EveryWhere ID. This could be used by other organizations to access your [facility] medical records  [id number]        Your Vitals Were     Pulse Temperature Respirations Pulse Oximetry          64 98.1  F (36.7  C) (Tympanic) 20 98%         Blood Pressure from Last 3 Encounters:   10/22/18 130/70   10/19/18 138/76   10/15/18 132/76    Weight from Last 3 Encounters:   10/08/18 161 lb (73 kg)   04/27/18 168 lb (76.2 kg)   10/04/17 171 lb (77.6 kg)              Today, you had the following     No orders found for display       Primary Care Provider Office Phone # Fax #    Rosalinoasif Castillo,  [phone] [address] [facility] [address]        Equal Access to Services     PAN MACKAY : Hadii aad ku hadasho Soomaali, waaxda luqadaha, qaybta kaalmada adeegyada, norma pisano . So [facility] [phone].    ATENCIÓN: Si habla español, tiene a ojeda disposición servicios gratuitos de asistencia lingüística. Dharmesh al [phone].    We comply with applicable federal civil rights laws and [place] laws. We do not discriminate on the basis of race, color, national origin, age, disability, sex, sexual orientation, or gender identity.            Thank you!     Thank you for choosing [facility]  for your care. Our goal is always to provide you with excellent care. Hearing back from our patients is one way we can continue to improve our services. Please take a few minutes to complete the written survey that you may receive in the mail after your visit with us. Thank you!             Your Updated Medication List - Protect others around you: Learn how to safely use, store and throw away your medicines at www.disposemymeds.org.          This list is accurate as of 10/22/18 12:51 PM.  Always use your most recent med list.                   Brand Name Dispense Instructions for use Diagnosis    ASPIRIN PO      Take 81 mg by mouth daily         cyclobenzaprine 10 MG tablet    FLEXERIL    20 tablet    Take half to one tablet every 8 hours as needed for muscle pain        metoprolol succinate 100 MG 24 hr tablet    TOPROL-XL    90 tablet    Take 1 tablet (100 mg) by mouth daily Takes one tablet daily        Multi-vitamin Tabs tablet      Take 1 tablet by mouth daily        triamterene-hydrochlorothiazide 37.5-25 MG per tablet    MAXZIDE-25    90 tablet    TAKE 1 TABLET BY MOUTH DAILY IN THE MORNING    Benign essential hypertension       VENTOLIN  (90 Base) MCG/ACT inhaler   Generic drug:  albuterol     18 g    INHALE 2 PUFFS INTO THE LUNGS EVERY 6 HOURS AS NEEDED FOR SHORTNESS OF BREATH OR DIFFICULT BREATHING OR WHEEZING    Bronchitis, allergic, unspecified asthma severity, uncomplicated

## 2018-10-24 ENCOUNTER — RADIANT APPOINTMENT (OUTPATIENT)
Dept: GENERAL RADIOLOGY | Facility: OTHER | Age: 63
End: 2018-10-24
Attending: INTERNAL MEDICINE
Payer: COMMERCIAL

## 2018-10-24 ENCOUNTER — OFFICE VISIT (OUTPATIENT)
Dept: PEDIATRICS | Facility: OTHER | Age: 63
End: 2018-10-24
Attending: INTERNAL MEDICINE
Payer: COMMERCIAL

## 2018-10-24 VITALS
SYSTOLIC BLOOD PRESSURE: 128 MMHG | HEART RATE: 58 BPM | OXYGEN SATURATION: 98 % | TEMPERATURE: 97.4 F | DIASTOLIC BLOOD PRESSURE: 78 MMHG

## 2018-10-24 DIAGNOSIS — M54.2 NECK PAIN: ICD-10-CM

## 2018-10-24 DIAGNOSIS — M54.2 NECK PAIN: Primary | ICD-10-CM

## 2018-10-24 PROCEDURE — 98925 OSTEOPATH MANJ 1-2 REGIONS: CPT | Performed by: INTERNAL MEDICINE

## 2018-10-24 PROCEDURE — 72040 X-RAY EXAM NECK SPINE 2-3 VW: CPT | Mod: TC

## 2018-10-24 ASSESSMENT — PAIN SCALES - GENERAL: PAINLEVEL: MODERATE PAIN (5)

## 2018-10-24 NOTE — MR AVS SNAPSHOT
After Visit Summary   10/24/2018    Su Land    MRN: [id number]           Patient Information     Date Of Birth          1955        Visit Information        Provider Department      10/24/2018 1:00 PM [facility]        Today's Diagnoses     Neck pain    -  1      Care Instructions    Take ibuprofen 600 mg twice per day with food and muscle relaxer at bedtime for the next three day.          Follow-ups after your visit        Follow-up notes from your care team     Return if symptoms worsen or fail to improve.      Future tests that were ordered for you today     Open Future Orders        Priority Expected Expires Ordered    XR CERVICAL SPINE 2/3 VWS (Clinic Performed) Routine 10/24/2018 10/24/2019 10/24/2018            Who to contact     If you have questions or need follow up information about today's clinic visit or your schedule please contact [facility] directly at [phone].  Normal or non-critical lab and imaging results will be communicated to you by MyChart, letter or phone within 4 business days after the clinic has received the results. If you do not hear from us within 7 days, please contact the clinic through Verisimhart or phone. If you have a critical or abnormal lab result, we will notify you by phone as soon as possible.  Submit refill requests through [employer] or call your pharmacy and they will forward the refill request to us. Please allow 3 business days for your refill to be completed.          Additional Information About Your Visit        Verisimhart Information     [employer] gives you secure access to your electronic health record. If you see a primary care provider, you can also send messages to your care team and make appointments. If you have questions, please call your primary care clinic.  If you do not have a primary care provider, please call [phone] and they will assist you.        Care  EveryWhere ID     This is your Care EveryWhere ID. This could be used by other organizations to access your [facility] medical records  [id number]        Your Vitals Were     Pulse Temperature Pulse Oximetry             58 97.4  F (36.3  C) (Tympanic) 98%          Blood Pressure from Last 3 Encounters:   10/24/18 128/78   10/22/18 130/70   10/19/18 138/76    Weight from Last 3 Encounters:   10/08/18 161 lb (73 kg)   04/27/18 168 lb (76.2 kg)   10/04/17 171 lb (77.6 kg)               Primary Care Provider Office Phone # Fax #    Rosalino Castillo,  [phone] [address] [address]        Equal Access to Services     KATHYA MACKAY : Cori donahueo Soomaali, waaxda luqadaha, qaybta kaalmada adeegyada, norma pisano . [facility] [phone].    ATENCIÓN: Si habla español, tiene a ojeda disposición servicios gratuitos de asistencia lingüística. [facility] [phone].    We comply with applicable federal civil rights laws and [place] laws. We do not discriminate on the basis of race, color, national origin, age, disability, sex, sexual orientation, or gender identity.            Thank you!     Thank you for choosing [facility]  for your care. Our goal is always to provide you with excellent care. Hearing back from our patients is one way we can continue to improve our services. Please take a few minutes to complete the written survey that you may receive in the mail after your visit with us. Thank you!             Your Updated Medication List - Protect others around you: Learn how to safely use, store and throw away your medicines at www.disposemymeds.org.          This list is accurate as of 10/24/18  1:25 PM.  Always use your most recent med list.                   Brand Name Dispense Instructions for use Diagnosis    ASPIRIN PO      Take 81 mg by mouth daily        cyclobenzaprine 10 MG tablet    FLEXERIL    20 tablet     Take half to one tablet every 8 hours as needed for muscle pain        metoprolol succinate 100 MG 24 hr tablet    TOPROL-XL    90 tablet    Take 1 tablet (100 mg) by mouth daily Takes one tablet daily        Multi-vitamin Tabs tablet      Take 1 tablet by mouth daily        triamterene-hydrochlorothiazide 37.5-25 MG per tablet    MAXZIDE-25    90 tablet    TAKE 1 TABLET BY MOUTH DAILY IN THE MORNING    Benign essential hypertension       VENTOLIN  (90 Base) MCG/ACT inhaler   Generic drug:  albuterol     18 g    INHALE 2 PUFFS INTO THE LUNGS EVERY 6 HOURS AS NEEDED FOR SHORTNESS OF BREATH OR DIFFICULT BREATHING OR WHEEZING    Bronchitis, allergic, unspecified asthma severity, uncomplicated

## 2018-10-24 NOTE — PROGRESS NOTES
SUBJECTIVE:   Su Land is a 63 year old female who presents to clinic today for the following health issues:      HPI  Patient is a 62 yo female who presents for her neck pain and repeat OMT.  She reports that her neck pain  Is better after her last OMT  She has noticed improvement in her ROM.  Patient has a slight occipital headache or without any vision changes.  She denies any weakness or tingling or changes in sensation in the upper extremities. She denies any dizziness.  She denies any abdominal pain or nausea.  She reports that her headaches are resolved.      Problem list and histories reviewed & adjusted, as indicated.  Additional history: as documented        Patient Active Problem List   Diagnosis     Preop general physical exam     Swelling of right knee joint     BPPV (benign paroxysmal positional vertigo)     Routine general medical examination at a health care facility     Essential thrombocytosis (H)     Benign essential hypertension     Somatic dysfunction of cervical region     Neck pain     Past Surgical History:   Procedure Laterality Date     ABDOMEN SURGERY       x2     APPENDECTOMY       COLONOSCOPY  age 50    Normal     COLONOSCOPY N/A 2017    Procedure: COLONOSCOPY;  WHOLE COLON COLONOSCOPY WITH POLYPECTOMY;  Surgeon: Isabelle Faulkner MD;  Location: [facility]     GYN SURGERY       x2     ORTHOPEDIC SURGERY  2013    left total knee replacement     ORTHOPEDIC SURGERY  10/28/2014    right total knee replacement       Social History   Substance Use Topics     Smoking status: Never Smoker     Smokeless tobacco: Never Used     Alcohol use Yes      Comment: occasional     Family History   Problem Relation Age of Onset     Hypertension Brother      Other - See Comments Brother      Stroke     Coronary Artery Disease Brother      Hypertension Brother      Other - See Comments Brother      PVD     Other - See Comments Father 51     cerebral aneurysm; cause of death      Hypertension Mother      Thyroid Disease Mother      hypothyroidism     HEART DISEASE Mother      myocardial infarction     Psychotic Disorder Mother      dementia     C.A.D. Mother 70     Alzheimer Disease Mother      Hypertension Sister      C.A.D. Sister      Thyroid Disease Sister      hypothyroid     HEART DISEASE Sister 59     MI      Unknown/Adopted Maternal Grandmother      Unknown/Adopted Maternal Grandfather      Unknown/Adopted Paternal Grandmother      Unknown/Adopted Paternal Grandfather            ROS:  Constitutional, HEENT, cardiovascular, pulmonary, gi and gu systems are negative, except as otherwise noted.    OBJECTIVE:     /78 (BP Location: Right arm, Patient Position: Sitting, Cuff Size: Adult Regular)  Pulse 58  Temp 97.4  F (36.3  C) (Tympanic)  SpO2 98%  There is no height or weight on file to calculate BMI.  GENERAL: healthy, alert and no distress  MS:  OA motion is normal.    Head rotation:    Active rotation is 45 degrees to the left and 45 degrees to the right.    There is prominence of the C7 vertebra on the left with poor translation to the right.    The cervical vertebra C7   are side bent right and rotated right.      Diagnostic Test Results:  10/24/18  1:36 PM [id number] [facility] [place]    [employer] Interactive Report and InfoRx   View the interactive report   PACS Images   Show images for XR CERVICAL SPINE 2/3 VWS (Clinic Performed)   Study Result   PROCEDURE: XR CERVICAL SPINE 2/3 VWS 10/24/2018 1:36 PM     HISTORY: ; Neck pain     COMPARISONS: None.     TECHNIQUE: AP and lateral     FINDINGS: The cervical discs are normal in height. There is some mild  anterior osteophytes seen at C4-C5 and C5-C6. There are advanced  cervical facet joint degenerative changes most severe in the upper  cervical spine on the right. The prevertebral soft tissues appear  normal.          IMPRESSION:  severe facet joint degenerative changes in the cervical  spine  particularly on the right     CONTRERAS ADAN MD         ASSESSMENT/PLAN:   (M99.01) Somatic dysfunction of cervical region  (primary encounter diagnosis)  Comment: Her headaches are resolved and her ROM is better today.  Plan:   OMT of the cervical spine.    Procedure note:  Passive head rotation to the left is at 45 degrees and to the right is at 50degrees.  Cervical vertebra C7 is rotated right and side bent right.  C7 vertebra have poor translation to the right.     Patient was placed into the supine position.  Myofascial release was performed on the cervical region with mild cephalad traction using the occiput as leverage.  This was performed for 3-5 minutes.  Next the C7 level was engaged with applied pressure over the left side putting the head into left side bending while the opposite hand stabilized the head.  The patient applied a counter force in the opposite direction as the examiner resisted the motion.  The counter motion was applied for threes sets of 5 seconds at the level of C5 with the examiner engaging the new barrier in left side bending of the head and neck after each set.     Myofacial release was repeated for 5 minutes follow by stretching of the right trapezius myofascial tissue with the examiner's right hand on the patient's anterior left shoulder and the right arm creating and cradle for the patient's head.  The examiner's left hand was placed on the right parietal region of the patient's head guiding the patient into left side bending of the head and neck.  This was held for 2 minutes.  The same technique was applied to the head and neck for stretching the left trapezius. The treatment was completed with myofascial release as described above.        Post treatment assessment showed the head rotation to be 45 degrees to the left and 45 degrees to the right.  The cervical vertebra motion at levels C2 thru C7 showed equal translation to both the left and the right.  The patient reported that  his/her neck pain had improved.         Patient Instructions   Take ibuprofen 600 mg twice per day with food and muscle relaxer at bedtime for the next three day.         FUTURE APPOINTMENTS:       - Follow-up PRN    Rosalino Castillo DO, DO  [facility] [place]

## 2018-10-24 NOTE — PATIENT INSTRUCTIONS
Take ibuprofen 600 mg twice per day with food and muscle relaxer at bedtime for the next three day.

## 2018-10-24 NOTE — NURSING NOTE
Chief Complaint   Patient presents with     Manipulation       Initial /78 (BP Location: Right arm, Patient Position: Sitting, Cuff Size: Adult Regular)  Pulse 58  Temp 97.4  F (36.3  C) (Tympanic)  SpO2 98% Estimated body mass index is 31.44 kg/(m^2) as calculated from the following:    Height as of 10/8/18: 5' (1.524 m).    Weight as of 10/8/18: 161 lb (73 kg).  Medication Reconciliation: complete    Johnna Greenberg LPN

## 2018-11-28 ENCOUNTER — TELEPHONE (OUTPATIENT)
Dept: PEDIATRICS | Facility: OTHER | Age: 63
End: 2018-11-28

## 2018-11-28 ENCOUNTER — OFFICE VISIT (OUTPATIENT)
Dept: PEDIATRICS | Facility: OTHER | Age: 63
End: 2018-11-28
Attending: INTERNAL MEDICINE
Payer: COMMERCIAL

## 2018-11-28 VITALS
BODY MASS INDEX: 32.03 KG/M2 | TEMPERATURE: 96.4 F | HEART RATE: 56 BPM | DIASTOLIC BLOOD PRESSURE: 68 MMHG | SYSTOLIC BLOOD PRESSURE: 132 MMHG | WEIGHT: 164 LBS | OXYGEN SATURATION: 96 %

## 2018-11-28 DIAGNOSIS — B02.9 HERPES ZOSTER WITHOUT COMPLICATION: Primary | ICD-10-CM

## 2018-11-28 PROCEDURE — 99213 OFFICE O/P EST LOW 20 MIN: CPT | Performed by: INTERNAL MEDICINE

## 2018-11-28 RX ORDER — VALACYCLOVIR HYDROCHLORIDE 1 G/1
1000 TABLET, FILM COATED ORAL 3 TIMES DAILY
Qty: 24 TABLET | Refills: 0 | Status: SHIPPED | OUTPATIENT
Start: 2018-11-28 | End: 2019-05-29

## 2018-11-28 ASSESSMENT — PAIN SCALES - GENERAL: PAINLEVEL: MODERATE PAIN (4)

## 2018-11-28 NOTE — MR AVS SNAPSHOT
After Visit Summary   11/28/2018    Su Land    MRN: [id number]           Patient Information     Date Of Birth          1955        Visit Information        Provider Department      11/28/2018 11:40 AM Rosalino Castillo,  [facility]        Today's Diagnoses     Herpes zoster without complication    -  1       Follow-ups after your visit        Who to contact     If you have questions or need follow up information about today's clinic visit or your schedule please contact [facility] directly at [phone].  Normal or non-critical lab and imaging results will be communicated to you by Pharmaxishart, letter or phone within 4 business days after the clinic has received the results. If you do not hear from us within 7 days, please contact the clinic through Pharmaxishart or phone. If you have a critical or abnormal lab result, we will notify you by phone as soon as possible.  Submit refill requests through In2Games or call your pharmacy and they will forward the refill request to us. Please allow 3 business days for your refill to be completed.          Additional Information About Your Visit        MyChart Information     [employer] gives you secure access to your electronic health record. If you see a primary care provider, you can also send messages to your care team and make appointments. If you have questions, please call your primary care clinic.  If you do not have a primary care provider, please call [phone] and they will assist you.        Care EveryWhere ID     This is your Care EveryWhere ID. This could be used by other organizations to access your [facility] medical records  [id number]        Your Vitals Were     Pulse Temperature Pulse Oximetry BMI (Body Mass Index)          56 96.4  F (35.8  C) (Tympanic) 96% 32.03 kg/m2         Blood Pressure from Last 3 Encounters:   11/28/18 132/68   10/24/18 128/78   10/22/18 130/70    Weight from  Last 3 Encounters:   11/28/18 164 lb (74.4 kg)   10/08/18 161 lb (73 kg)   04/27/18 168 lb (76.2 kg)              Today, you had the following     No orders found for display         Today's Medication Changes          These changes are accurate as of 11/28/18 12:08 PM.  If you have any questions, ask your nurse or doctor.               Start taking these medicines.        Dose/Directions    valACYclovir 1000 mg tablet   Commonly known as:  VALTREX   Used for:  Herpes zoster without complication   Started by:  Rosalino Castillo DO        Dose:  1000 mg   Take 1 tablet (1,000 mg) by mouth 3 times daily for 8 days   Quantity:  24 tablet   Refills:  0            Where to get your medicines      These medications were sent to [facility] [id number] [facility], [place] - [address] AT [facility] OF [facility] [address], [facility] [address]     Phone:  [phone]     valACYclovir 1000 mg tablet                Primary Care Provider Office Phone # Fax #    Rosalino Castillo [phone]       [address] [place] [id number]        Equal Access to Services     [facility]: Hadii aad ku hadasho Soomaali, waaxda luqadaha, qaybta kaalmada adeegyada, norma dixon hayobin diana pisano . So [facility] [phone].    ATENCIÓN: Si habla español, tiene a ojeda disposición servicios gratuitos de asistencia lingüística. Llame al [phone].    We comply with applicable federal civil rights laws and [place] laws. We do not discriminate on the basis of race, color, national origin, age, disability, sex, sexual orientation, or gender identity.            Thank you!     Thank you for choosing [facility]  for your care. Our goal is always to provide you with excellent care. Hearing back from our patients is one way we can continue to improve our services. Please take a few minutes to complete the written survey that you may receive in the mail after your visit with  us. Thank you!             Your Updated Medication List - Protect others around you: Learn how to safely use, store and throw away your medicines at www.disposemymeds.org.          This list is accurate as of 11/28/18 12:08 PM.  Always use your most recent med list.                   Brand Name Dispense Instructions for use Diagnosis    ASPIRIN PO      Take 81 mg by mouth daily        cyclobenzaprine 10 MG tablet    FLEXERIL    20 tablet    Take half to one tablet every 8 hours as needed for muscle pain        metoprolol succinate 100 MG 24 hr tablet    TOPROL-XL    90 tablet    Take 1 tablet (100 mg) by mouth daily Takes one tablet daily        Multi-vitamin tablet      Take 1 tablet by mouth daily        triamterene-HCTZ 37.5-25 MG tablet    MAXZIDE-25    90 tablet    TAKE 1 TABLET BY MOUTH DAILY IN THE MORNING    Benign essential hypertension       valACYclovir 1000 mg tablet    VALTREX    24 tablet    Take 1 tablet (1,000 mg) by mouth 3 times daily for 8 days    Herpes zoster without complication       VENTOLIN  (90 Base) MCG/ACT inhaler   Generic drug:  albuterol     18 g    INHALE 2 PUFFS INTO THE LUNGS EVERY 6 HOURS AS NEEDED FOR SHORTNESS OF BREATH OR DIFFICULT BREATHING OR WHEEZING    Bronchitis, allergic, unspecified asthma severity, uncomplicated

## 2018-11-28 NOTE — PROGRESS NOTES
SUBJECTIVE:   Su Land is a 63 year old female who presents to clinic today for the following health issues:      Rash      Duration: 3 days ago worsening    Description  Location: Rash started on abdomen moved to back   Itching: moderate    Intensity:  moderate    Accompanying signs and symptoms: numbness, tingling and red raised rash    History (similar episodes/previous evaluation): None    Precipitating or alleviating factors:  New exposures:  None  Recent travel: no      Therapies tried and outcome: powder that uses all the time, helpful for a couple of minutes, asiprin      She started getting numbness over the left inguinal region followed by itching and burning rash over the inguinal region and the left lower back.    -------------------------------------    Problem list and histories reviewed & adjusted, as indicated.  Additional history: as documented    Patient Active Problem List   Diagnosis     Preop general physical exam     Swelling of right knee joint     BPPV (benign paroxysmal positional vertigo)     Routine general medical examination at a health care facility     Essential thrombocytosis (H)     Benign essential hypertension     Somatic dysfunction of cervical region     Neck pain     Past Surgical History:   Procedure Laterality Date     ABDOMEN SURGERY       x2     APPENDECTOMY       COLONOSCOPY N/A 2017    Procedure: COLONOSCOPY;  WHOLE COLON COLONOSCOPY WITH POLYPECTOMY;  Surgeon: Isabelle Faulkner MD;  Location: [facility]     GYN SURGERY       x2     ORTHOPEDIC SURGERY  2013    left total knee replacement     ORTHOPEDIC SURGERY  10/28/2014    right total knee replacement       Social History   Substance Use Topics     Smoking status: Never Smoker     Smokeless tobacco: Never Used     Alcohol use Yes      Comment: occasional     Family History   Problem Relation Age of Onset     Hypertension Brother      Other - See Comments Brother      Stroke     Coronary  Artery Disease Brother      Hypertension Brother      Other - See Comments Brother      PVD     Other - See Comments Father 51     cerebral aneurysm; cause of death     Hypertension Mother      Thyroid Disease Mother      hypothyroidism     Heart Disease Mother      myocardial infarction     Psychotic Disorder Mother      dementia     C.A.D. Mother 70     Alzheimer Disease Mother      Hypertension Sister      C.A.D. Sister      Thyroid Disease Sister      hypothyroid     Heart Disease Sister 59     MI      Unknown/Adopted Maternal Grandmother      Unknown/Adopted Maternal Grandfather      Unknown/Adopted Paternal Grandmother      Unknown/Adopted Paternal Grandfather            Reviewed and updated as needed this visit by clinical staff  Allergies  Meds       Reviewed and updated as needed this visit by Provider         ROS:  CONSTITUTIONAL: NEGATIVE for fever, chills, change in weight  INTEGUMENTARY/SKIN: As noted above.    OBJECTIVE:     /68 (BP Location: Left arm, Patient Position: Sitting, Cuff Size: Adult Regular)  Pulse 56  Temp 96.4  F (35.8  C) (Tympanic)  Wt 164 lb (74.4 kg)  SpO2 96%  BMI 32.03 kg/m2  Body mass index is 32.03 kg/(m^2).  GENERAL: healthy, alert and no distress  SKIN: vesicle snd maculopapular eruption    - left inguinal line and over L3 of the left side of the back     Diagnostic Test Results:  none     ASSESSMENT/PLAN:   (B02.9) Herpes zoster without complication  (primary encounter diagnosis)  Comment: over the L3 distribution.  Her rash is maturing yet with few vesicles formed. We discussed pain control options should she need this post rash  Plan:   valACYclovir (VALTREX) 1000 mg tablet TID for 8 day.                      FUTURE APPOINTMENTS:       - Follow-up visit ALEX Castillo DO, DO  [facility] [place]

## 2018-11-28 NOTE — NURSING NOTE
Chief Complaint   Patient presents with     Derm Problem     thinks is shingles       Initial /68 (BP Location: Left arm, Patient Position: Sitting, Cuff Size: Adult Regular)  Pulse 56  Temp 96.4  F (35.8  C) (Tympanic)  Wt 164 lb (74.4 kg)  SpO2 96%  BMI 32.03 kg/m2 Estimated body mass index is 32.03 kg/(m^2) as calculated from the following:    Height as of 10/8/18: 5' (1.524 m).    Weight as of this encounter: 164 lb (74.4 kg).  Medication Reconciliation: complete    Myrna Iglesias LPN

## 2018-11-28 NOTE — TELEPHONE ENCOUNTER
Patient called requesting appointment for possible shingles.  Patient states she has a rash on back that started 2 days ago.  The rash is itchy, and feels like nerves tingling on left side.  It has been getting worse.      Scheduled patient today.     Next 5 appointments (look out 90 days)     Nov 28, 2018 11:40 AM CST   (Arrive by 11:20 AM)   SHORT with Rosalino Castillo DO   [facility] ([facility] )    [address] [place] [address]   [phone]

## 2019-04-18 DIAGNOSIS — I10 BENIGN ESSENTIAL HYPERTENSION: ICD-10-CM

## 2019-04-18 NOTE — LETTER
April 22, 2019      Su Land   [address] [place] [id number]        Dear Su,       APPOINTMENT REMINDER:   Our records indicates that it is time for you to be seen for office visit, medication review, and lab to be determined.     Your current medication request will be approved for one refill but you will need to be seen before any additional refills can be approved.  Taking care of your health is important to us, and ongoing visits with your provider are vital to your care.    We look forward to seeing you in the near future.  You may call our office at [phone] to schedule a visit.     Please disregard this notice if you have already made an appointment.        Sincerely,  Rosalino Castillo, DO, DO

## 2019-04-22 RX ORDER — METOPROLOL SUCCINATE 100 MG/1
TABLET, EXTENDED RELEASE ORAL
Qty: 30 TABLET | Refills: 0 | Status: SHIPPED | OUTPATIENT
Start: 2019-04-22 | End: 2019-05-29

## 2019-04-22 RX ORDER — TRIAMTERENE/HYDROCHLOROTHIAZID 37.5-25 MG
TABLET ORAL
Qty: 30 TABLET | Refills: 0 | Status: SHIPPED | OUTPATIENT
Start: 2019-04-22 | End: 2019-05-29

## 2019-05-22 NOTE — PROGRESS NOTES
SUBJECTIVE:   CC: Su Land is an 63 year old woman who presents for preventive health visit.     Healthy Habits:    Do you get at least three servings of calcium containing foods daily (dairy, green leafy vegetables, etc.)? yes    Amount of exercise or daily activities, outside of work: 6 day(s) per week    Problems taking medications regularly No    Medication side effects: Yes edema in lower extremities    Have you had an eye exam in the past two years? yes    Do you see a dentist twice per year? yes    Do you have sleep apnea, excessive snoring or daytime drowsiness?no      Hypertension Follow-up      Do you check your blood pressure regularly outside of the clinic? No occasionally    Are you following a low salt diet? No added salt    Are your blood pressures ever more than 140 on the top number (systolic) OR more   than 90 on the bottom number (diastolic), for example 140/90? Yes, before going on medications    Today's PHQ-2 Score:   PHQ-2 ( 1999 [employer]) 4/25/2018   Q1: Little interest or pleasure in doing things 0   Q2: Feeling down, depressed or hopeless 0   PHQ-2 Score 0   Q1: Little interest or pleasure in doing things Not at all   Q2: Feeling down, depressed or hopeless Not at all   PHQ-2 Score 0       BP Readings from Last 6 Encounters:   05/29/19 122/72   11/28/18 132/68   10/24/18 128/78   10/22/18 130/70   10/19/18 138/76   10/15/18 132/76     Abuse: Current or Past(Physical, Sexual or Emotional)- No  Do you feel safe in your environment? Yes    Social History     Tobacco Use     Smoking status: Never Smoker     Smokeless tobacco: Never Used   Substance Use Topics     Alcohol use: Yes     Comment: occasional     If you drink alcohol do you typically have >3 drinks per day or >7 drinks per week? No                     Reviewed orders with patient.  Reviewed health maintenance and updated orders accordingly - Yes  BP Readings from Last 3 Encounters:   05/29/19 122/72   11/28/18 132/68    10/24/18 128/78    Wt Readings from Last 3 Encounters:   19 72.6 kg (160 lb)   18 74.4 kg (164 lb)   10/08/18 73 kg (161 lb)                    Mammogram Screening: Patient over age 50, mutual decision to screen reflected in health maintenance.    Pertinent mammograms are reviewed under the imaging tab.  History of abnormal Pap smear: NO - age 30-65 PAP every 5 years with negative HPV co-testing recommended  PAP / HPV 11/10/2015   PAP NIL     Reviewed and updated as needed this visit by clinical staff  Tobacco  Allergies  Meds  Med Hx  Surg Hx  Fam Hx  Soc Hx        Reviewed and updated as needed this visit by Provider        Past Medical History:   Diagnosis Date     Abnormal weight gain      Acute pharyngitis      Acute sinusitis, unspecified      Conjunctivitis unspecified      Essential thrombocytosis (H)     Confirmed with bone marrow biopsy     H/O mammogram 2014, 2015, 2017, 2018     Hypertension      Other screening mammogram      Pain in joint, lower leg      Routine general medical examination at a health care facility      Special screening for malignant neoplasms, colon      Streptococcal sore throat       Past Surgical History:   Procedure Laterality Date     ABDOMEN SURGERY       x2     APPENDECTOMY       COLONOSCOPY N/A 2017    Procedure: COLONOSCOPY;  WHOLE COLON COLONOSCOPY WITH POLYPECTOMY;  Surgeon: Isabelle Faulkner MD;  Location: [facility]     GYN SURGERY       x2     ORTHOPEDIC SURGERY  2013    left total knee replacement     ORTHOPEDIC SURGERY  10/28/2014    right total knee replacement       ROS:  CONSTITUTIONAL: NEGATIVE for fever, chills, change in weight  INTEGUMENTARY/SKIN: NEGATIVE for worrisome rashes, moles or lesions  EYES: NEGATIVE for vision changes or irritation  ENT: NEGATIVE for ear, mouth and throat problems  RESP: NEGATIVE for significant cough or SOB  BREAST: NEGATIVE for masses, tenderness or discharge  CV:  NEGATIVE for chest pain, palpitations or peripheral edema  CV: POSITIVE for lower extremity edema  GI: NEGATIVE for nausea, abdominal pain, heartburn, or change in bowel habits  : NEGATIVE for unusual urinary or vaginal symptoms. No vaginal bleeding.  MUSCULOSKELETAL: NEGATIVE for significant arthralgias or myalgia  NEURO: NEGATIVE for weakness, dizziness or paresthesias  PSYCHIATRIC: NEGATIVE for changes in mood or affect     OBJECTIVE:   /72 (BP Location: Right arm, Patient Position: Sitting, Cuff Size: Adult Regular)   Pulse 55   Temp 98.3  F (36.8  C) (Oral)   Wt 72.6 kg (160 lb)   SpO2 96%   BMI 31.25 kg/m    EXAM:  GENERAL: healthy, alert and no distress  EYES: Eyes grossly normal to inspection, PERRL and conjunctivae and sclerae normal  HENT: ear canals and TM's normal, nose and mouth without ulcers or lesions  NECK: no adenopathy, no asymmetry, masses, or scars and thyroid normal to palpation  NECK: no carotid bruits  RESP: lungs clear to auscultation - no rales, rhonchi or wheezes  CV: regular rate and rhythm, normal S1 S2, no S3 or S4, no murmur, click or rub, no peripheral edema and peripheral pulses strong  ABDOMEN: soft, nontender, no hepatosplenomegaly, no masses and bowel sounds normal  ABDOMEN: no bruits heard  MS: no gross musculoskeletal defects noted, no edema  SKIN: no suspicious lesions or rashes  NEURO: Normal strength and tone, mentation intact and speech normal  NEURO: cranial nerves 3-12 intact  PSYCH: mentation appears normal, affect normal/bright  LYMPH: no cervical, supraclavicular, axillary, or inguinal adenopathy    Diagnostic Test Results:  Results for orders placed or performed in visit on 05/29/19 (from the past 24 hour(s))   Lipid Profile (Chol, Trig, HDL, LDL calc)   Result Value Ref Range    Cholesterol 154 <200 mg/dL    Triglycerides 115 <150 mg/dL    HDL Cholesterol 47 (L) >49 mg/dL    LDL Cholesterol Calculated 84 <100 mg/dL    Non HDL Cholesterol 107 <130 mg/dL    Comprehensive metabolic panel   Result Value Ref Range    Sodium 141 133 - 144 mmol/L    Potassium 3.4 3.4 - 5.3 mmol/L    Chloride 106 94 - 109 mmol/L    Carbon Dioxide 30 20 - 32 mmol/L    Anion Gap 5 3 - 14 mmol/L    Glucose 88 70 - 99 mg/dL    Urea Nitrogen 20 7 - 30 mg/dL    Creatinine 0.71 0.52 - 1.04 mg/dL    GFR Estimate 89 >60 mL/min/[1.73_m2]    GFR Estimate If Black >90 >60 mL/min/[1.73_m2]    Calcium 9.3 8.5 - 10.1 mg/dL    Bilirubin Total 0.6 0.2 - 1.3 mg/dL    Albumin 3.9 3.4 - 5.0 g/dL    Protein Total 7.8 6.8 - 8.8 g/dL    Alkaline Phosphatase 93 40 - 150 U/L    ALT 47 0 - 50 U/L    AST 29 0 - 45 U/L   CBC with platelets   Result Value Ref Range    WBC 5.6 4.0 - 11.0 10e9/L    RBC Count 5.20 3.8 - 5.2 10e12/L    Hemoglobin 15.2 11.7 - 15.7 g/dL    Hematocrit 44.7 35.0 - 47.0 %    MCV 86 78 - 100 fl    MCH 29.2 26.5 - 33.0 pg    MCHC 34.0 31.5 - 36.5 g/dL    RDW 13.3 10.0 - 15.0 %    Platelet Count 387 150 - 450 10e9/L           Recent Labs   Lab Test 05/29/19  0816 04/25/18  0805  11/10/15  1520 10/01/14  0742   CHOL 154 170   < > 188 180   HDL 47* 34*   < > 47* 46*   LDL 84 96   < > 114 89   TRIG 115 198*   < > 133 224*   CHOLHDLRATIO  --   --   --  4.0 3.9    < > = values in this interval not displayed.     ASSESSMENT/PLAN:   (Z00.00) Routine general medical examination at a health care facility  (primary encounter diagnosis)  Comment: Su is up to date in her mammograms and PAP s.  She should have a pneumococcal 23 as she has unspecified intermittent bronchitis.  Plan:   Her next colonoscopy with be due in 2027 and PAP 2020.  She will have her PCV 13 at age 64.    (D47.3) Essential thrombocytosis (H)  Comment: She has stable platelets.  Plan:   She will continue ASA 81 mg daily.    (I10) Benign essential hypertension  Comment: At goal.  However, her potassium is low normal due to her diuretic.  Plan:   She will continue metoprolol succinate ER (TOPROL-XL) 100 MG 24 hr tablet, daily and   triamterene-HCTZ (MAXZIDE-25) 37.5-25 MG tablet    (E87.6) Low blood potassium  Comment: Low normal range on diuretics.  Plan:   Start potassium chloride ER (K-DUR/KLOR-CON M) 10 MEQ  CR tablet BID.    (R60.0) Bilateral leg edema  Comment: Ankle and foot edema secondary to venous insufficiency.     Plan:   furosemide (LASIX) 20 MG tablet twice per week as needed.    (E78.2) Mixed hyperlipidemia  Comment: Improved control with exercise.  She has a rising HDL and her LDL is below 100.  Plan:   She will continue to monitor her diet and exercise at least 4 days per week.    (J45.909) Bronchitis, allergic, unspecified asthma severity, uncomplicated  Comment:   Plan: albuterol (VENTOLIN HFA) 108 (90 Base) MCG/ACT         inhaler, cetirizine (ZYRTEC) 10 MG tablet    (Z23) Need for vaccination  Comment: She has unspecified Asthma/ bronchitis which is intermittent.  Plan: Pneumococcal vaccine 23 valent PPSV23          (Pneumovax) [28536], ADMIN: Vaccine, Initial         (38891), 1st  Administration  [09743]            COUNSELING:   Reviewed preventive health counseling, as reflected in patient instructions       Regular exercise       Immunizations    Vaccinated for: Pneumococcal             Osteoporosis Prevention/Bone Health       The 10-year ASCVD risk score ([place] DC Jr., et al., 2013) is: 5.7%    Values used to calculate the score:      Age: 63 years      Sex: Female      Is Non- : No      Diabetic: No      Tobacco smoker: No      Systolic Blood Pressure: 122 mmHg      Is BP treated: Yes      HDL Cholesterol: 47 mg/dL      Total Cholesterol: 154 mg/dL    Estimated body mass index is 31.25 kg/m  as calculated from the following:    Height as of 10/8/18: 1.524 m (5').    Weight as of this encounter: 72.6 kg (160 lb).    Weight management plan: Discussed healthy diet and exercise guidelines     reports that she has never smoked. She has never used smokeless tobacco.      Counseling Resources:  ATP IV  Guidelines  Pooled Cohorts Equation Calculator  Breast Cancer Risk Calculator  FRAX Risk Assessment  ICSI Preventive Guidelines  Dietary Guidelines for Americans, 2010  USDA's MyPlate  ASA Prophylaxis  Lung CA Screening    Rosalino Castillo DO, DO  [facility]

## 2019-05-29 ENCOUNTER — APPOINTMENT (OUTPATIENT)
Dept: LAB | Facility: OTHER | Age: 64
End: 2019-05-29
Attending: INTERNAL MEDICINE
Payer: COMMERCIAL

## 2019-05-29 ENCOUNTER — OFFICE VISIT (OUTPATIENT)
Dept: INTERNAL MEDICINE | Facility: OTHER | Age: 64
End: 2019-05-29
Attending: INTERNAL MEDICINE
Payer: COMMERCIAL

## 2019-05-29 VITALS
BODY MASS INDEX: 31.25 KG/M2 | WEIGHT: 160 LBS | OXYGEN SATURATION: 96 % | SYSTOLIC BLOOD PRESSURE: 122 MMHG | TEMPERATURE: 98.3 F | HEART RATE: 55 BPM | DIASTOLIC BLOOD PRESSURE: 72 MMHG

## 2019-05-29 DIAGNOSIS — I10 BENIGN ESSENTIAL HYPERTENSION: ICD-10-CM

## 2019-05-29 DIAGNOSIS — J45.909 BRONCHITIS, ALLERGIC, UNSPECIFIED ASTHMA SEVERITY, UNCOMPLICATED: ICD-10-CM

## 2019-05-29 DIAGNOSIS — Z00.00 ROUTINE GENERAL MEDICAL EXAMINATION AT A HEALTH CARE FACILITY: Primary | ICD-10-CM

## 2019-05-29 DIAGNOSIS — Z23 NEED FOR VACCINATION: ICD-10-CM

## 2019-05-29 DIAGNOSIS — E78.2 MIXED HYPERLIPIDEMIA: ICD-10-CM

## 2019-05-29 DIAGNOSIS — E87.6 LOW BLOOD POTASSIUM: ICD-10-CM

## 2019-05-29 DIAGNOSIS — R60.0 BILATERAL LEG EDEMA: ICD-10-CM

## 2019-05-29 DIAGNOSIS — D47.3 ESSENTIAL THROMBOCYTOSIS (H): ICD-10-CM

## 2019-05-29 LAB
ALBUMIN SERPL-MCNC: 3.9 G/DL (ref 3.4–5)
ALP SERPL-CCNC: 93 U/L (ref 40–150)
ALT SERPL W P-5'-P-CCNC: 47 U/L (ref 0–50)
ANION GAP SERPL CALCULATED.3IONS-SCNC: 5 MMOL/L (ref 3–14)
AST SERPL W P-5'-P-CCNC: 29 U/L (ref 0–45)
BILIRUB SERPL-MCNC: 0.6 MG/DL (ref 0.2–1.3)
BUN SERPL-MCNC: 20 MG/DL (ref 7–30)
CALCIUM SERPL-MCNC: 9.3 MG/DL (ref 8.5–10.1)
CHLORIDE SERPL-SCNC: 106 MMOL/L (ref 94–109)
CHOLEST SERPL-MCNC: 154 MG/DL
CO2 SERPL-SCNC: 30 MMOL/L (ref 20–32)
CREAT SERPL-MCNC: 0.71 MG/DL (ref 0.52–1.04)
ERYTHROCYTE [DISTWIDTH] IN BLOOD BY AUTOMATED COUNT: 13.3 % (ref 10–15)
GFR SERPL CREATININE-BSD FRML MDRD: 89 ML/MIN/{1.73_M2}
GLUCOSE SERPL-MCNC: 88 MG/DL (ref 70–99)
HCT VFR BLD AUTO: 44.7 % (ref 35–47)
HDLC SERPL-MCNC: 47 MG/DL
HGB BLD-MCNC: 15.2 G/DL (ref 11.7–15.7)
LDLC SERPL CALC-MCNC: 84 MG/DL
MCH RBC QN AUTO: 29.2 PG (ref 26.5–33)
MCHC RBC AUTO-ENTMCNC: 34 G/DL (ref 31.5–36.5)
MCV RBC AUTO: 86 FL (ref 78–100)
NONHDLC SERPL-MCNC: 107 MG/DL
PLATELET # BLD AUTO: 387 10E9/L (ref 150–450)
POTASSIUM SERPL-SCNC: 3.4 MMOL/L (ref 3.4–5.3)
PROT SERPL-MCNC: 7.8 G/DL (ref 6.8–8.8)
RBC # BLD AUTO: 5.2 10E12/L (ref 3.8–5.2)
SODIUM SERPL-SCNC: 141 MMOL/L (ref 133–144)
TRIGL SERPL-MCNC: 115 MG/DL
WBC # BLD AUTO: 5.6 10E9/L (ref 4–11)

## 2019-05-29 PROCEDURE — 99396 PREV VISIT EST AGE 40-64: CPT | Mod: 25 | Performed by: INTERNAL MEDICINE

## 2019-05-29 PROCEDURE — 80053 COMPREHEN METABOLIC PANEL: CPT | Performed by: INTERNAL MEDICINE

## 2019-05-29 PROCEDURE — 36415 COLL VENOUS BLD VENIPUNCTURE: CPT | Performed by: INTERNAL MEDICINE

## 2019-05-29 PROCEDURE — 90732 PPSV23 VACC 2 YRS+ SUBQ/IM: CPT | Performed by: INTERNAL MEDICINE

## 2019-05-29 PROCEDURE — 90471 IMMUNIZATION ADMIN: CPT | Performed by: INTERNAL MEDICINE

## 2019-05-29 PROCEDURE — 80061 LIPID PANEL: CPT | Performed by: INTERNAL MEDICINE

## 2019-05-29 PROCEDURE — 85027 COMPLETE CBC AUTOMATED: CPT | Performed by: INTERNAL MEDICINE

## 2019-05-29 RX ORDER — TRIAMTERENE/HYDROCHLOROTHIAZID 37.5-25 MG
1 TABLET ORAL DAILY
Qty: 90 TABLET | Refills: 3 | Status: SHIPPED | OUTPATIENT
Start: 2019-05-29 | End: 2020-05-15

## 2019-05-29 RX ORDER — POTASSIUM CHLORIDE 750 MG/1
10 TABLET, EXTENDED RELEASE ORAL 2 TIMES DAILY
Qty: 180 TABLET | Refills: 3 | Status: SHIPPED | OUTPATIENT
Start: 2019-05-29 | End: 2020-02-20

## 2019-05-29 RX ORDER — ALBUTEROL SULFATE 90 UG/1
AEROSOL, METERED RESPIRATORY (INHALATION)
Qty: 18 G | Refills: 0 | Status: SHIPPED | OUTPATIENT
Start: 2019-05-29

## 2019-05-29 RX ORDER — CETIRIZINE HYDROCHLORIDE 10 MG/1
10 TABLET ORAL DAILY
Qty: 90 TABLET | Refills: 3 | Status: SHIPPED | OUTPATIENT
Start: 2019-05-29 | End: 2020-08-17

## 2019-05-29 RX ORDER — METOPROLOL SUCCINATE 100 MG/1
100 TABLET, EXTENDED RELEASE ORAL DAILY
Qty: 90 TABLET | Refills: 3 | Status: SHIPPED | OUTPATIENT
Start: 2019-05-29 | End: 2020-05-15

## 2019-05-29 RX ORDER — FUROSEMIDE 20 MG
20 TABLET ORAL
Qty: 30 TABLET | Refills: 1 | Status: SHIPPED | OUTPATIENT
Start: 2019-05-30 | End: 2019-12-23

## 2019-05-29 ASSESSMENT — PAIN SCALES - GENERAL: PAINLEVEL: NO PAIN (0)

## 2019-05-29 ASSESSMENT — ANXIETY QUESTIONNAIRES
2. NOT BEING ABLE TO STOP OR CONTROL WORRYING: NOT AT ALL
GAD7 TOTAL SCORE: 0
7. FEELING AFRAID AS IF SOMETHING AWFUL MIGHT HAPPEN: NOT AT ALL
5. BEING SO RESTLESS THAT IT IS HARD TO SIT STILL: NOT AT ALL
6. BECOMING EASILY ANNOYED OR IRRITABLE: NOT AT ALL
3. WORRYING TOO MUCH ABOUT DIFFERENT THINGS: NOT AT ALL
1. FEELING NERVOUS, ANXIOUS, OR ON EDGE: NOT AT ALL

## 2019-05-29 ASSESSMENT — PATIENT HEALTH QUESTIONNAIRE - PHQ9
SUM OF ALL RESPONSES TO PHQ QUESTIONS 1-9: 0
5. POOR APPETITE OR OVEREATING: NOT AT ALL

## 2019-05-29 NOTE — NURSING NOTE
Chief Complaint   Patient presents with     Physical       Initial Pulse 55   Temp 98.3  F (36.8  C) (Oral)   Wt 72.6 kg (160 lb)   SpO2 96%   BMI 31.25 kg/m   Estimated body mass index is 31.25 kg/m  as calculated from the following:    Height as of 10/8/18: 1.524 m (5').    Weight as of this encounter: 72.6 kg (160 lb).  Medication Reconciliation: complete    Jessa Behrman, LPN

## 2019-05-30 ASSESSMENT — ASTHMA QUESTIONNAIRES: ACT_TOTALSCORE: 25

## 2019-05-30 ASSESSMENT — ANXIETY QUESTIONNAIRES: GAD7 TOTAL SCORE: 0

## 2019-12-19 DIAGNOSIS — R60.0 BILATERAL LEG EDEMA: ICD-10-CM

## 2019-12-23 RX ORDER — FUROSEMIDE 20 MG
TABLET ORAL
Qty: 30 TABLET | Refills: 1 | Status: SHIPPED | OUTPATIENT
Start: 2019-12-23 | End: 2020-06-30

## 2020-02-17 NOTE — PROGRESS NOTES
Subjective     Su Land is a 64 year old female who presents to clinic today for the following health issues:    HPI   Musculoskeletal problem/pain      Duration: years but in the last year has been worse    Description  Location: wrist, both wrists with the left being worse than the right.  She wears wrist splints at night.    Intensity:  mild    Accompanying signs and symptoms: radiation of pain to left hand and arm    History  Previous similar problem: YES  Previous evaluation:  none    Precipitating or alleviating factors:  Trauma or overuse: no   Aggravating factors include: overuse    Therapies tried and outcome: wears wrist brace at night      She reports numbness in the wrist and hands.  This pain does wake her up at night.  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  She denies any neck pain.  She has not noted any weakness of her  or dropped items.    -------------------------------------    Patient Active Problem List   Diagnosis     Preop general physical exam     Swelling of right knee joint     BPPV (benign paroxysmal positional vertigo)     Routine general medical examination at a health care facility     Essential thrombocytosis (H)     Benign essential hypertension     Somatic dysfunction of cervical region     Neck pain     Bronchitis, allergic, unspecified asthma severity, uncomplicated     Past Surgical History:   Procedure Laterality Date     ABDOMEN SURGERY       x2     APPENDECTOMY       COLONOSCOPY N/A 2017    Procedure: COLONOSCOPY;  WHOLE COLON COLONOSCOPY WITH POLYPECTOMY;  Surgeon: Isabelle Faulkner MD;  Location: [facility]     GYN SURGERY       x2     ORTHOPEDIC SURGERY  2013    left total knee replacement     ORTHOPEDIC SURGERY  10/28/2014    right total knee replacement       Social History     Tobacco Use     Smoking status: Never Smoker     Smokeless tobacco: Never Used   Substance Use Topics     Alcohol use: Yes     Comment: occasional      Family History   Problem Relation Age of Onset     Hypertension Brother      Other - See Comments Brother         Stroke     Coronary Artery Disease Brother      Hypertension Brother      Other - See Comments Brother         PVD     Other - See Comments Father 51        cerebral aneurysm; cause of death     Hypertension Mother      Thyroid Disease Mother         hypothyroidism     Heart Disease Mother         myocardial infarction     Psychotic Disorder Mother         dementia     C.A.D. Mother 70     Alzheimer Disease Mother      Hypertension Sister      C.A.D. Sister      Thyroid Disease Sister         hypothyroid     Heart Disease Sister 59        MI      Unknown/Adopted Maternal Grandmother      Unknown/Adopted Maternal Grandfather      Unknown/Adopted Paternal Grandmother      Unknown/Adopted Paternal Grandfather          Current Outpatient Medications   Medication Sig Dispense Refill     albuterol (VENTOLIN HFA) 108 (90 Base) MCG/ACT inhaler INHALE 2 PUFFS INTO THE LUNGS EVERY 6 HOURS AS NEEDED FOR SHORTNESS OF BREATH OR DIFFICULT BREATHING OR WHEEZING 18 g 0     ASPIRIN PO Take 81 mg by mouth daily       cetirizine (ZYRTEC) 10 MG tablet Take 1 tablet (10 mg) by mouth daily 90 tablet 3     furosemide (LASIX) 20 MG tablet TAKE 1 TABLET BY MOUTH TWICE A WEEK IF NEEDED FOR LEG EDEMA 30 tablet 1     metoprolol succinate ER (TOPROL-XL) 100 MG 24 hr tablet Take 1 tablet (100 mg) by mouth daily 90 tablet 3     multivitamin, therapeutic with minerals (MULTI-VITAMIN) TABS Take 1 tablet by mouth daily       triamterene-HCTZ (MAXZIDE-25) 37.5-25 MG tablet Take 1 tablet by mouth daily 90 tablet 3       -------------------------------------  Reviewed and updated as needed this visit by Provider         Review of Systems   ROS COMP: Constitutional, HEENT, cardiovascular, pulmonary, gi and gu systems are negative, except as otherwise noted.      Objective    /70 (BP Location: Right arm, Patient Position: Sitting, Cuff  Size: Adult Regular)   Pulse 55   Temp 96.1  F (35.6  C) (Tympanic)   Resp 16   Ht 1.524 m (5')   Wt 76.7 kg (169 lb)   SpO2 98%   BMI 33.01 kg/m    Body mass index is 33.01 kg/m .  Physical Exam   GENERAL: healthy, alert and no distress  MS: no gross musculoskeletal defects noted, no edema  MS:   Hand  strength is 5/5.  Tinel testing a the elbows is negative for tingling on the both hand(s).  Tinel testing at the wrists is produces tingling in the left fingers.  Prayer testing is negative for pain and numbness over the both hand(s), Phalen testing is positive  for pain and numbness over the left hand and assisted prayer testing  produces hand pain or numbness left hand.      Diagnostic Test Results:  Labs reviewed in Epic  none                 ASSESSMENT /PLAN:  (R20.0,  R20.2) Numbness and tingling in both hands  (primary encounter diagnosis)  Comment: Left hand nessa wrist shows more signs on exam than the right.  Plan:   EMG, NEUROLOGY ADULT REFERRAL          Follow up with Provider - ALEX Castillo DO, DO

## 2020-02-20 ENCOUNTER — OFFICE VISIT (OUTPATIENT)
Dept: INTERNAL MEDICINE | Facility: OTHER | Age: 65
End: 2020-02-20
Attending: INTERNAL MEDICINE
Payer: COMMERCIAL

## 2020-02-20 VITALS
BODY MASS INDEX: 33.18 KG/M2 | OXYGEN SATURATION: 98 % | HEART RATE: 55 BPM | TEMPERATURE: 96.1 F | WEIGHT: 169 LBS | HEIGHT: 60 IN | DIASTOLIC BLOOD PRESSURE: 70 MMHG | SYSTOLIC BLOOD PRESSURE: 124 MMHG | RESPIRATION RATE: 16 BRPM

## 2020-02-20 DIAGNOSIS — R20.2 NUMBNESS AND TINGLING IN BOTH HANDS: Primary | ICD-10-CM

## 2020-02-20 DIAGNOSIS — R20.0 NUMBNESS AND TINGLING IN BOTH HANDS: Primary | ICD-10-CM

## 2020-02-20 PROCEDURE — G0463 HOSPITAL OUTPT CLINIC VISIT: HCPCS

## 2020-02-20 PROCEDURE — 99213 OFFICE O/P EST LOW 20 MIN: CPT | Performed by: INTERNAL MEDICINE

## 2020-02-20 ASSESSMENT — PAIN SCALES - GENERAL: PAINLEVEL: NO PAIN (0)

## 2020-02-20 ASSESSMENT — MIFFLIN-ST. JEOR: SCORE: 1233.08

## 2020-02-20 NOTE — NURSING NOTE
Chief Complaint   Patient presents with     Musculoskeletal Problem       Initial /70 (BP Location: Right arm, Patient Position: Sitting, Cuff Size: Adult Regular)   Pulse 55   Temp 96.1  F (35.6  C) (Tympanic)   Resp 16   Ht 1.524 m (5')   Wt 76.7 kg (169 lb)   SpO2 98%   BMI 33.01 kg/m   Estimated body mass index is 33.01 kg/m  as calculated from the following:    Height as of this encounter: 1.524 m (5').    Weight as of this encounter: 76.7 kg (169 lb).  Medication Reconciliation: complete  Naa Gallagher LPN

## 2020-03-11 ENCOUNTER — TRANSFERRED RECORDS (OUTPATIENT)
Dept: HEALTH INFORMATION MANAGEMENT | Facility: CLINIC | Age: 65
End: 2020-03-11

## 2020-05-15 DIAGNOSIS — I10 BENIGN ESSENTIAL HYPERTENSION: ICD-10-CM

## 2020-05-15 RX ORDER — TRIAMTERENE/HYDROCHLOROTHIAZID 37.5-25 MG
1 TABLET ORAL DAILY
Qty: 90 TABLET | Refills: 3 | Status: SHIPPED | OUTPATIENT
Start: 2020-05-15 | End: 2021-05-10

## 2020-05-15 RX ORDER — METOPROLOL SUCCINATE 100 MG/1
TABLET, EXTENDED RELEASE ORAL
Qty: 90 TABLET | Refills: 3 | Status: SHIPPED | OUTPATIENT
Start: 2020-05-15 | End: 2021-05-10

## 2020-05-15 NOTE — TELEPHONE ENCOUNTER
maxzide      Last Written Prescription Date:  5.29.19  Last Fill Quantity: 9-,   # refills: 3  Last Office Visit: 2.20.20    metoprolol      Last Written Prescription Date:  5.29.19  Last Fill Quantity: 90,   # refills: 0  Last Office Visit: 2.20.20

## 2020-06-25 ENCOUNTER — TELEPHONE (OUTPATIENT)
Dept: PEDIATRICS | Facility: OTHER | Age: 65
End: 2020-06-25

## 2020-06-25 NOTE — TELEPHONE ENCOUNTER
Called left  for patient to call back to notify that she is due for a mammogram and seeing if she wanted to do that

## 2020-06-26 DIAGNOSIS — R60.0 BILATERAL LEG EDEMA: ICD-10-CM

## 2020-06-30 RX ORDER — FUROSEMIDE 20 MG
TABLET ORAL
Qty: 30 TABLET | Refills: 1 | Status: SHIPPED | OUTPATIENT
Start: 2020-06-30 | End: 2021-02-05

## 2020-06-30 NOTE — TELEPHONE ENCOUNTER
lasix      Last Written Prescription Date:  12/23/19  Last Fill Quantity: 30,   # refills: 1  Last Office Visit: 2/20/20  Future Office visit:       Routing refill request to provider for review/approval because:  Drug not on the [facility], [facility] or [facility] refill protocol or controlled substance

## 2020-07-07 ENCOUNTER — TELEPHONE (OUTPATIENT)
Dept: PEDIATRICS | Facility: OTHER | Age: 65
End: 2020-07-07

## 2020-08-16 DIAGNOSIS — J45.909 BRONCHITIS, ALLERGIC, UNSPECIFIED ASTHMA SEVERITY, UNCOMPLICATED: ICD-10-CM

## 2020-08-17 RX ORDER — CETIRIZINE HYDROCHLORIDE 10 MG/1
TABLET ORAL
Qty: 90 TABLET | Refills: 3 | Status: SHIPPED | OUTPATIENT
Start: 2020-08-17 | End: 2022-03-02

## 2020-08-17 NOTE — TELEPHONE ENCOUNTER
zyrtec      Last Written Prescription Date:  5-  Last Fill Quantity: 90,   # refills: 3  Last Office Visit: 2-  Future Office visit:       Routing refill request to provider for review/approval because:

## 2020-09-08 ENCOUNTER — TRANSFERRED RECORDS (OUTPATIENT)
Dept: HEALTH INFORMATION MANAGEMENT | Facility: CLINIC | Age: 65
End: 2020-09-08

## 2020-11-04 NOTE — PROGRESS NOTES
"Subjective     Su Land is a 65 year old female who presents to clinic today for the following health issues:    HPI         New Patient/Transfer of Care  Hypertension Follow-up      Do you check your blood pressure regularly outside of the clinic? Yes     Are you following a low salt diet? No    Are your blood pressures ever more than 140 on the top number (systolic) OR more   than 90 on the bottom number (diastolic), for example 140/90? No    Asthma Follow-Up    Was ACT completed today?    Yes    ACT Total Scores 5/29/2019   ACT TOTAL SCORE (Goal Greater than or Equal to 20) 25   In the past 12 months, how many times did you visit the emergency room for your asthma without being admitted to the hospital? 0   In the past 12 months, how many times were you hospitalized overnight because of your asthma? 0          How many days per week do you miss taking your asthma controller medication?  I do not have an asthma controller medication    Please describe any recent triggers for your asthma: None    Have you had any Emergency Room Visits, Urgent Care Visits, or Hospital Admissions since your last office visit?  No      How many servings of fruits and vegetables do you eat daily?  2-3    On average, how many sweetened beverages do you drink each day (Examples: soda, juice, sweet tea, etc.  Do NOT count diet or artificially sweetened beverages)?   0    How many days per week do you exercise enough to make your heart beat faster? 7    How many minutes a day do you exercise enough to make your heart beat faster? 60 or more    How many days per week do you miss taking your medication? 0        Review of Systems   Constitutional, HEENT, cardiovascular, pulmonary, gi and gu systems are negative, except as otherwise noted.      Objective    /76 (BP Location: Left arm, Patient Position: Chair, Cuff Size: Adult Regular)   Pulse 51   Temp 96.2  F (35.7  C) (Tympanic)   Resp 20   Ht 1.549 m (5' 1\")   Wt 77.6 kg " "(171 lb)   SpO2 98%   BMI 32.31 kg/m    Body mass index is 32.31 kg/m .  Physical Exam   GENERAL: healthy, alert and no distress  NECK: no adenopathy, no asymmetry, masses, or scars and thyroid normal to palpation  RESP: lungs clear to auscultation - no rales, rhonchi or wheezes  CV: regular rate and rhythm, normal S1 S2, no S3 or S4, no murmur, click or rub, no peripheral edema and peripheral pulses strong  ABDOMEN: soft, nontender, no hepatosplenomegaly, no masses and bowel sounds normal  MS: no gross musculoskeletal defects noted, no edema  PSYCH: mentation appears normal, affect normal/bright    No results found for any visits on 11/09/20.        Assessment & Plan     Essential thrombocytosis (H)  Stable on baby ASA, will monitor annually  - CBC with platelets    Benign essential hypertension  Stable, continue same meds    Family history of thyroid disease  Labs today  - TSH with free T4 reflex    Hyperlipidemia LDL goal <130  Fasting today, follow-up 1 year  - Comprehensive metabolic panel  - Lipid Profile (Chol, Trig, HDL, LDL calc)    Post-menopausal  due  - DX Hip/Pelvis/Spine; Future     30 min spent with patient, greater than 50% education and counseling, discussion of health history and coordination of care  BMI:   Estimated body mass index is 32.31 kg/m  as calculated from the following:    Height as of this encounter: 1.549 m (5' 1\").    Weight as of this encounter: 77.6 kg (171 lb).   Weight management plan: Discussed healthy diet and exercise guidelines       Patient was agreeable to this plan and had no further questions.  There are no Patient Instructions on file for this visit.    Return in about 1 year (around 11/9/2021) for with me, Follow up.    Katalina Neville MD  [facility] [place]      "

## 2020-11-09 ENCOUNTER — TELEPHONE (OUTPATIENT)
Dept: FAMILY MEDICINE | Facility: OTHER | Age: 65
End: 2020-11-09

## 2020-11-09 ENCOUNTER — OFFICE VISIT (OUTPATIENT)
Dept: FAMILY MEDICINE | Facility: OTHER | Age: 65
End: 2020-11-09
Attending: FAMILY MEDICINE
Payer: COMMERCIAL

## 2020-11-09 VITALS
RESPIRATION RATE: 20 BRPM | TEMPERATURE: 96.2 F | BODY MASS INDEX: 32.28 KG/M2 | HEIGHT: 61 IN | WEIGHT: 171 LBS | SYSTOLIC BLOOD PRESSURE: 128 MMHG | OXYGEN SATURATION: 98 % | DIASTOLIC BLOOD PRESSURE: 76 MMHG | HEART RATE: 51 BPM

## 2020-11-09 DIAGNOSIS — I10 BENIGN ESSENTIAL HYPERTENSION: ICD-10-CM

## 2020-11-09 DIAGNOSIS — E78.5 HYPERLIPIDEMIA LDL GOAL <130: ICD-10-CM

## 2020-11-09 DIAGNOSIS — Z83.49 FAMILY HISTORY OF THYROID DISEASE: ICD-10-CM

## 2020-11-09 DIAGNOSIS — D47.3 ESSENTIAL THROMBOCYTOSIS (H): Primary | ICD-10-CM

## 2020-11-09 DIAGNOSIS — E78.5 HYPERLIPIDEMIA LDL GOAL <130: Primary | ICD-10-CM

## 2020-11-09 DIAGNOSIS — Z78.0 POST-MENOPAUSAL: ICD-10-CM

## 2020-11-09 LAB
ALBUMIN SERPL-MCNC: 3.9 G/DL (ref 3.4–5)
ALP SERPL-CCNC: 103 U/L (ref 40–150)
ALT SERPL W P-5'-P-CCNC: 37 U/L (ref 0–50)
ANION GAP SERPL CALCULATED.3IONS-SCNC: 6 MMOL/L (ref 3–14)
AST SERPL W P-5'-P-CCNC: 25 U/L (ref 0–45)
BILIRUB SERPL-MCNC: 0.6 MG/DL (ref 0.2–1.3)
BUN SERPL-MCNC: 20 MG/DL (ref 7–30)
CALCIUM SERPL-MCNC: 9.3 MG/DL (ref 8.5–10.1)
CHLORIDE SERPL-SCNC: 105 MMOL/L (ref 94–109)
CHOLEST SERPL-MCNC: 216 MG/DL
CO2 SERPL-SCNC: 27 MMOL/L (ref 20–32)
CREAT SERPL-MCNC: 0.65 MG/DL (ref 0.52–1.04)
ERYTHROCYTE [DISTWIDTH] IN BLOOD BY AUTOMATED COUNT: 13.2 % (ref 10–15)
GFR SERPL CREATININE-BSD FRML MDRD: >90 ML/MIN/{1.73_M2}
GLUCOSE SERPL-MCNC: 93 MG/DL (ref 70–99)
HCT VFR BLD AUTO: 44.6 % (ref 35–47)
HDLC SERPL-MCNC: 48 MG/DL
HGB BLD-MCNC: 15.3 G/DL (ref 11.7–15.7)
LDLC SERPL CALC-MCNC: 135 MG/DL
MCH RBC QN AUTO: 29.1 PG (ref 26.5–33)
MCHC RBC AUTO-ENTMCNC: 34.3 G/DL (ref 31.5–36.5)
MCV RBC AUTO: 85 FL (ref 78–100)
NONHDLC SERPL-MCNC: 168 MG/DL
PLATELET # BLD AUTO: 384 10E9/L (ref 150–450)
POTASSIUM SERPL-SCNC: 3.8 MMOL/L (ref 3.4–5.3)
PROT SERPL-MCNC: 8 G/DL (ref 6.8–8.8)
RBC # BLD AUTO: 5.25 10E12/L (ref 3.8–5.2)
SODIUM SERPL-SCNC: 138 MMOL/L (ref 133–144)
TRIGL SERPL-MCNC: 167 MG/DL
TSH SERPL DL<=0.005 MIU/L-ACNC: 1.03 MU/L (ref 0.4–4)
WBC # BLD AUTO: 6.9 10E9/L (ref 4–11)

## 2020-11-09 PROCEDURE — 80053 COMPREHEN METABOLIC PANEL: CPT | Mod: ZL | Performed by: FAMILY MEDICINE

## 2020-11-09 PROCEDURE — G0463 HOSPITAL OUTPT CLINIC VISIT: HCPCS

## 2020-11-09 PROCEDURE — 36415 COLL VENOUS BLD VENIPUNCTURE: CPT | Mod: ZL | Performed by: FAMILY MEDICINE

## 2020-11-09 PROCEDURE — 84443 ASSAY THYROID STIM HORMONE: CPT | Mod: ZL | Performed by: FAMILY MEDICINE

## 2020-11-09 PROCEDURE — 99203 OFFICE O/P NEW LOW 30 MIN: CPT | Performed by: FAMILY MEDICINE

## 2020-11-09 PROCEDURE — 80061 LIPID PANEL: CPT | Mod: ZL | Performed by: FAMILY MEDICINE

## 2020-11-09 PROCEDURE — 85027 COMPLETE CBC AUTOMATED: CPT | Mod: ZL | Performed by: FAMILY MEDICINE

## 2020-11-09 SDOH — SOCIAL STABILITY: SOCIAL NETWORK: ARE YOU MARRIED, WIDOWED, DIVORCED, SEPARATED, NEVER MARRIED, OR LIVING WITH A PARTNER?: NOT ASKED

## 2020-11-09 SDOH — HEALTH STABILITY: MENTAL HEALTH: HOW OFTEN DO YOU HAVE 6 OR MORE DRINKS ON ONE OCCASION?: NOT ASKED

## 2020-11-09 SDOH — SOCIAL STABILITY: SOCIAL NETWORK
DO YOU BELONG TO ANY CLUBS OR ORGANIZATIONS SUCH AS CHURCH GROUPS UNIONS, FRATERNAL OR ATHLETIC GROUPS, OR SCHOOL GROUPS?: NOT ASKED

## 2020-11-09 SDOH — HEALTH STABILITY: MENTAL HEALTH: HOW MANY STANDARD DRINKS CONTAINING ALCOHOL DO YOU HAVE ON A TYPICAL DAY?: 1 OR 2

## 2020-11-09 SDOH — ECONOMIC STABILITY: FOOD INSECURITY: WITHIN THE PAST 12 MONTHS, THE FOOD YOU BOUGHT JUST DIDN'T LAST AND YOU DIDN'T HAVE MONEY TO GET MORE.: NOT ASKED

## 2020-11-09 SDOH — ECONOMIC STABILITY: TRANSPORTATION INSECURITY
IN THE PAST 12 MONTHS, HAS THE LACK OF TRANSPORTATION KEPT YOU FROM MEDICAL APPOINTMENTS OR FROM GETTING MEDICATIONS?: NOT ASKED

## 2020-11-09 SDOH — ECONOMIC STABILITY: FOOD INSECURITY: WITHIN THE PAST 12 MONTHS, YOU WORRIED THAT YOUR FOOD WOULD RUN OUT BEFORE YOU GOT MONEY TO BUY MORE.: NOT ASKED

## 2020-11-09 SDOH — HEALTH STABILITY: MENTAL HEALTH
STRESS IS WHEN SOMEONE FEELS TENSE, NERVOUS, ANXIOUS, OR CAN'T SLEEP AT NIGHT BECAUSE THEIR MIND IS TROUBLED. HOW STRESSED ARE YOU?: NOT ASKED

## 2020-11-09 SDOH — SOCIAL STABILITY: SOCIAL INSECURITY
WITHIN THE LAST YEAR, HAVE TO BEEN RAPED OR FORCED TO HAVE ANY KIND OF SEXUAL ACTIVITY BY YOUR PARTNER OR EX-PARTNER?: NO

## 2020-11-09 SDOH — SOCIAL STABILITY: SOCIAL NETWORK: HOW OFTEN DO YOU GET TOGETHER WITH FRIENDS OR RELATIVES?: NOT ASKED

## 2020-11-09 SDOH — SOCIAL STABILITY: SOCIAL INSECURITY: WITHIN THE LAST YEAR, HAVE YOU BEEN HUMILIATED OR EMOTIONALLY ABUSED IN OTHER WAYS BY YOUR PARTNER OR EX-PARTNER?: NO

## 2020-11-09 SDOH — HEALTH STABILITY: PHYSICAL HEALTH: ON AVERAGE, HOW MANY DAYS PER WEEK DO YOU ENGAGE IN MODERATE TO STRENUOUS EXERCISE (LIKE A BRISK WALK)?: 5 DAYS

## 2020-11-09 SDOH — ECONOMIC STABILITY: INCOME INSECURITY: HOW HARD IS IT FOR YOU TO PAY FOR THE VERY BASICS LIKE FOOD, HOUSING, MEDICAL CARE, AND HEATING?: NOT ASKED

## 2020-11-09 SDOH — HEALTH STABILITY: MENTAL HEALTH: HOW OFTEN DO YOU HAVE A DRINK CONTAINING ALCOHOL?: 2-4 TIMES A MONTH

## 2020-11-09 SDOH — HEALTH STABILITY: PHYSICAL HEALTH: ON AVERAGE, HOW MANY MINUTES DO YOU ENGAGE IN EXERCISE AT THIS LEVEL?: 60 MIN

## 2020-11-09 SDOH — ECONOMIC STABILITY: TRANSPORTATION INSECURITY
IN THE PAST 12 MONTHS, HAS LACK OF TRANSPORTATION KEPT YOU FROM MEETINGS, WORK, OR FROM GETTING THINGS NEEDED FOR DAILY LIVING?: NOT ASKED

## 2020-11-09 SDOH — SOCIAL STABILITY: SOCIAL NETWORK: HOW OFTEN DO YOU ATTENT MEETINGS OF THE CLUB OR ORGANIZATION YOU BELONG TO?: NOT ASKED

## 2020-11-09 SDOH — SOCIAL STABILITY: SOCIAL NETWORK: HOW OFTEN DO YOU ATTEND CHURCH OR RELIGIOUS SERVICES?: NOT ASKED

## 2020-11-09 SDOH — SOCIAL STABILITY: SOCIAL INSECURITY
WITHIN THE LAST YEAR, HAVE YOU BEEN KICKED, HIT, SLAPPED, OR OTHERWISE PHYSICALLY HURT BY YOUR PARTNER OR EX-PARTNER?: NO

## 2020-11-09 SDOH — SOCIAL STABILITY: SOCIAL INSECURITY: WITHIN THE LAST YEAR, HAVE YOU BEEN AFRAID OF YOUR PARTNER OR EX-PARTNER?: NO

## 2020-11-09 SDOH — SOCIAL STABILITY: SOCIAL NETWORK: IN A TYPICAL WEEK, HOW MANY TIMES DO YOU TALK ON THE PHONE WITH FAMILY, FRIENDS, OR NEIGHBORS?: NOT ASKED

## 2020-11-09 ASSESSMENT — ANXIETY QUESTIONNAIRES
2. NOT BEING ABLE TO STOP OR CONTROL WORRYING: NOT AT ALL
7. FEELING AFRAID AS IF SOMETHING AWFUL MIGHT HAPPEN: NOT AT ALL
GAD7 TOTAL SCORE: 0
1. FEELING NERVOUS, ANXIOUS, OR ON EDGE: NOT AT ALL
3. WORRYING TOO MUCH ABOUT DIFFERENT THINGS: NOT AT ALL
6. BECOMING EASILY ANNOYED OR IRRITABLE: NOT AT ALL
5. BEING SO RESTLESS THAT IT IS HARD TO SIT STILL: NOT AT ALL

## 2020-11-09 ASSESSMENT — PAIN SCALES - GENERAL: PAINLEVEL: NO PAIN (0)

## 2020-11-09 ASSESSMENT — MIFFLIN-ST. JEOR: SCORE: 1253.03

## 2020-11-09 ASSESSMENT — PATIENT HEALTH QUESTIONNAIRE - PHQ9
5. POOR APPETITE OR OVEREATING: NOT AT ALL
SUM OF ALL RESPONSES TO PHQ QUESTIONS 1-9: 0

## 2020-11-09 NOTE — NURSING NOTE
"Chief Complaint   Patient presents with     Establish Care       Initial /76 (BP Location: Left arm, Patient Position: Chair, Cuff Size: Adult Regular)   Pulse 51   Temp 96.2  F (35.7  C) (Tympanic)   Resp 20   Ht 1.549 m (5' 1\")   Wt 77.6 kg (171 lb)   SpO2 98%   BMI 32.31 kg/m   Estimated body mass index is 32.31 kg/m  as calculated from the following:    Height as of this encounter: 1.549 m (5' 1\").    Weight as of this encounter: 77.6 kg (171 lb).  Medication Reconciliation: complete  Joaquina Palm LPN    "

## 2020-11-09 NOTE — LETTER
My Asthma Action Plan    Name: Su Land   YOB: 1955  Date: 11/9/2020   My doctor: Katalina Neville MD   [facility]        My Rescue Medicine:   Albuterol inhaler (Proair/Ventolin/Proventil HFA)  2-4 puffs EVERY 4 HOURS as needed. Use a spacer if recommended by your provider.   My Asthma Severity:   Intermittent / Exercise Induced  Know your asthma triggers: upper respiratory infections  None          GREEN ZONE   Good Control    I feel good    No cough or wheeze    Can work, sleep and play without asthma symptoms       Take your asthma control medicine every day.     1. If exercise triggers your asthma, take your rescue medication    15 minutes before exercise or sports, and    During exercise if you have asthma symptoms  2. Spacer to use with inhaler: If you have a spacer, make sure to use it with your inhaler             YELLOW ZONE Getting Worse  I have ANY of these:    I do not feel good    Cough or wheeze    Chest feels tight    Wake up at night   1. Keep taking your Green Zone medications  2. Start taking your rescue medicine:    every 20 minutes for up to 1 hour. Then every 4 hours for 24-48 hours.  3. If you stay in the Yellow Zone for more than 12-24 hours, contact your doctor.  4. If you do not return to the Green Zone in 12-24 hours or you get worse, start taking your oral steroid medicine if prescribed by your provider.           RED ZONE Medical Alert - Get Help  I have ANY of these:    I feel awful    Medicine is not helping    Breathing getting harder    Trouble walking or talking    Nose opens wide to breathe       1. Take your rescue medicine NOW  2. If your provider has prescribed an oral steroid medicine, start taking it NOW  3. Call your doctor NOW  4. If you are still in the Red Zone after 20 minutes and you have not reached your doctor:    Take your rescue medicine again and    Call 911 or go to the emergency room right away    See  your regular doctor within 2 weeks of an Emergency Room or Urgent Care visit for follow-up treatment.          Annual Reminders:  Meet with Asthma Educator,  Flu Shot in the Fall, consider Pneumonia Vaccination for patients with asthma (aged 19 and older).    Pharmacy: [id number] - [place], [place] - [address] [place] [facility] [place]  [address]    Electronically signed by Katalina Neville MD   Date: 11/09/20                    Asthma Triggers  How To Control Things That Make Your Asthma Worse    Triggers are things that make your asthma worse.  Look at the list below to help you find your triggers and   what you can do about them. You can help prevent asthma flare-ups by staying away from your triggers.      Trigger                                                          What you can do   Cigarette Smoke  Tobacco smoke can make asthma worse. Do not allow smoking in your home, car or around you.  Be sure no one smokes at a child s day care or school.  If you smoke, ask your health care provider for ways to help you quit.  Ask family members to quit too.  Ask your health care provider for a referral to Quit Plan to help you quit smoking, or call 1-085-159-PLAN.     Colds, Flu, Bronchitis  These are common triggers of asthma. Wash your hands often.  Don t touch your eyes, nose or mouth.  Get a flu shot every year.     Dust Mites  These are tiny bugs that live in cloth or carpet. They are too small to see. Wash sheets and blankets in hot water every week.   Encase pillows and mattress in dust mite proof covers.  Avoid having carpet if you can. If you have carpet, vacuum weekly.   Use a dust mask and HEPA vacuum.   Pollen and Outdoor Mold  Some people are allergic to trees, grass, or weed pollen, or molds. Try to keep your windows closed.  Limit time out doors when pollen count is high.   Ask you health care provider about taking medicine during allergy season.     Animal Dander  Some people are allergic to skin  flakes, urine or saliva from pets with fur or feathers. Keep pets with fur or feathers out of your home.    If you can t keep the pet outdoors, then keep the pet out of your bedroom.  Keep the bedroom door closed.  Keep pets off cloth furniture and away from stuffed toys.     Mice, Rats, and Cockroaches  Some people are allergic to the waste from these pests.   Cover food and garbage.  Clean up spills and food crumbs.  Store grease in the refrigerator.   Keep food out of the bedroom.   Indoor Mold  This can be a trigger if your home has high moisture. Fix leaking faucets, pipes, or other sources of water.   Clean moldy surfaces.  Dehumidify basement if it is damp and smelly.   Smoke, Strong Odors, and Sprays  These can reduce air quality. Stay away from strong odors and sprays, such as perfume, powder, hair spray, paints, smoke incense, paint, cleaning products, candles and new carpet.   Exercise or Sports  Some people with asthma have this trigger. Be active!  Ask your doctor about taking medicine before sports or exercise to prevent symptoms.    Warm up for 5-10 minutes before and after sports or exercise.     Other Triggers of Asthma  Cold air:  Cover your nose and mouth with a scarf.  Sometimes laughing or crying can be a trigger.  Some medicines and food can trigger asthma.

## 2020-11-10 ASSESSMENT — ANXIETY QUESTIONNAIRES: GAD7 TOTAL SCORE: 0

## 2020-12-20 ENCOUNTER — HEALTH MAINTENANCE LETTER (OUTPATIENT)
Age: 65
End: 2020-12-20

## 2020-12-21 ENCOUNTER — HOSPITAL ENCOUNTER (OUTPATIENT)
Dept: BONE DENSITY | Facility: HOSPITAL | Age: 65
Discharge: HOME | End: 2020-12-21
Attending: FAMILY MEDICINE | Admitting: FAMILY MEDICINE
Payer: MEDICARE

## 2020-12-21 DIAGNOSIS — Z78.0 POST-MENOPAUSAL: ICD-10-CM

## 2020-12-21 PROCEDURE — 77080 DXA BONE DENSITY AXIAL: CPT

## 2021-02-05 DIAGNOSIS — R60.0 BILATERAL LEG EDEMA: ICD-10-CM

## 2021-02-05 RX ORDER — FUROSEMIDE 20 MG
TABLET ORAL
Qty: 90 TABLET | Refills: 2 | Status: SHIPPED | OUTPATIENT
Start: 2021-02-05 | End: 2022-12-12

## 2021-02-05 NOTE — TELEPHONE ENCOUNTER
Furosemide 20 mg      Last Written Prescription Date:  6/30/20  Last Fill Quantity: 30,   # refills: 1  Last Office Visit: 11/9/20  Future Office visit:       Routing refill request to provider for review/approval because:

## 2021-05-10 DIAGNOSIS — I10 BENIGN ESSENTIAL HYPERTENSION: ICD-10-CM

## 2021-05-10 RX ORDER — TRIAMTERENE/HYDROCHLOROTHIAZID 37.5-25 MG
1 TABLET ORAL DAILY
Qty: 90 TABLET | Refills: 1 | Status: SHIPPED | OUTPATIENT
Start: 2021-05-10 | End: 2021-11-12

## 2021-05-10 RX ORDER — METOPROLOL SUCCINATE 100 MG/1
TABLET, EXTENDED RELEASE ORAL
Qty: 90 TABLET | Refills: 1 | Status: SHIPPED | OUTPATIENT
Start: 2021-05-10 | End: 2021-11-12

## 2021-05-10 NOTE — TELEPHONE ENCOUNTER
Metoprolol      Last Written Prescription Date:  5/15/20  Last Fill Quantity: 90,   # refills: 3  Last Office Visit: 11/9/20  Future Office visit:       Routing refill request to provider for review/approval because:      Maxzide      Last Written Prescription Date:  5/15/20  Last Fill Quantity: 90,   # refills: 3  Last Office Visit: 11/9/20  Future Office visit:       Routing refill request to provider for review/approval because:

## 2021-09-03 NOTE — PROGRESS NOTES
"SUBJECTIVE:   Su Land is a 65 year old female who presents for Preventive Visit.      Healthy Habits:     In general, how would you rate your overall health?  Excellent    Frequency of exercise:  6-7 days/week    Duration of exercise:  45-60 minutes    Do you usually eat at least 4 servings of fruit and vegetables a day, include whole grains    & fiber and avoid regularly eating high fat or \"junk\" foods?  Yes    Taking medications regularly:  Yes    Medication side effects:  None    Ability to successfully perform activities of daily living:  No assistance needed    Home Safety:  No safety concerns identified    Hearing Impairment:  No hearing concerns    In the past 6 months, have you been bothered by leaking of urine?  No    In general, how would you rate your overall mental or emotional health?  Excellent      PHQ-2 Total Score: 0    Additional concerns today:  No    Do you feel safe in your environment? Yes    Have you ever done Advance Care Planning? (For example, a Health Directive, POLST, or a discussion with a medical provider or your loved ones about your wishes): Yes, patient states has an Advance Care Planning document and will bring a copy to the clinic.       Fall risk  Fallen 2 or more times in the past year?: No  Any fall with injury in the past year?: No    Cognitive Screening   1) Repeat 3 items (Leader, Season, Table)    2) Clock draw: NORMAL  3) 3 item recall: Recalls 2 objects   Results: NORMAL clock, 1-2 items recalled: COGNITIVE IMPAIRMENT LESS LIKELY    Mini-CogTM Copyright ALEENA York. Licensed by the author for use in [facility]; reprinted with permission (andrew@.[facility]). All rights reserved.          Reviewed and updated as needed this visit by clinical staff  Tobacco  Allergies  Meds   Med Hx  Surg Hx  Fam Hx  Soc Hx        Reviewed and updated as needed this visit by Provider                Social History     Tobacco Use     Smoking status: Never Smoker     " Smokeless tobacco: Never Used   Substance Use Topics     Alcohol use: Yes     Comment: occasional         Alcohol Use 9/5/2021   Prescreen: >3 drinks/day or >7 drinks/week? No           Hyperlipidemia Follow-Up      Are you regularly taking any medication or supplement to lower your cholesterol?   No    Are you having muscle aches or other side effects that you think could be caused by your cholesterol lowering medication?  No    Hypertension Follow-up      Do you check your blood pressure regularly outside of the clinic? No     Are you following a low salt diet? No    Are your blood pressures ever more than 140 on the top number (systolic) OR more   than 90 on the bottom number (diastolic), for example 140/90? No      Current providers sharing in care for this patient include:   Patient Care Team:  Katalina Neville MD as PCP - General (Family Medicine)  Katalina Neville MD as Assigned PCP    The following health maintenance items are reviewed in Epic and correct as of today:  Health Maintenance Due   Topic Date Due     ZOSTER IMMUNIZATION (1 of 2) Never done     ASTHMA CONTROL TEST  11/29/2019     INFLUENZA VACCINE (1) 09/01/2021     Lab work is in process  Labs reviewed in EPIC  BP Readings from Last 3 Encounters:   09/07/21 122/80   11/09/20 128/76   02/20/20 124/70    Wt Readings from Last 3 Encounters:   09/07/21 74.3 kg (163 lb 12.8 oz)   11/09/20 77.6 kg (171 lb)   02/20/20 76.7 kg (169 lb)                  Patient Active Problem List   Diagnosis     Swelling of right knee joint     BPPV (benign paroxysmal positional vertigo)     Essential thrombocytosis (H)     Benign essential hypertension     Somatic dysfunction of cervical region     Neck pain     Hyperlipidemia LDL goal <130     Post-menopausal     Family history of thyroid disease     Past Surgical History:   Procedure Laterality Date     APPENDECTOMY       COLONOSCOPY N/A 04/17/2017    due 2027 WHOLE COLON COLONOSCOPY WITH POLYPECTOMY;  Surgeon:  Isabelle Faulkner MD;  Location: [facility]     GYN SURGERY       x2     ORTHOPEDIC SURGERY  2013    left total knee replacement     ORTHOPEDIC SURGERY  10/28/2014    right total knee replacement       Social History     Tobacco Use     Smoking status: Never Smoker     Smokeless tobacco: Never Used   Substance Use Topics     Alcohol use: Yes     Comment: occasional     Family History   Problem Relation Age of Onset     Hypertension Mother      Thyroid Disease Mother         hypothyroidism     Heart Disease Mother         myocardial infarction     C.A.D. Mother 70     Alzheimer Disease Mother      Other - See Comments Father 52        cerebral aneurysm; cause of death     Hypertension Sister      C.A.D. Sister      Thyroid Disease Sister         hypothyroid     Heart Disease Sister 59        MI      Hypertension Brother      Other - See Comments Brother         Stroke     Coronary Artery Disease Brother      Cerebral palsy Brother      Dementia Brother      Hypertension Brother      Peripheral Vascular Disease Brother         is a runner     Ulcerative Colitis Brother 66        getting IV infusions     Unknown/Adopted Maternal Grandmother          very young, cancer?     Suicide Maternal Grandfather      Heart Failure Paternal Grandmother         pneumonia     Heart Disease Paternal Grandfather 60         Current Outpatient Medications   Medication Sig Dispense Refill     albuterol (VENTOLIN HFA) 108 (90 Base) MCG/ACT inhaler INHALE 2 PUFFS INTO THE LUNGS EVERY 6 HOURS AS NEEDED FOR SHORTNESS OF BREATH OR DIFFICULT BREATHING OR WHEEZING 18 g 0     ASPIRIN PO Take 81 mg by mouth daily       cetirizine (ZYRTEC) 10 MG tablet TAKE 1 TABLET(10 MG) BY MOUTH DAILY 90 tablet 3     furosemide (LASIX) 20 MG tablet TAKE 1 TABLET BY MOUTH TWICE A WEEK IF NEEDED FOR LEG EDEMA 90 tablet 2     metoprolol succinate ER (TOPROL-XL) 100 MG 24 hr tablet TAKE 1 TABLET(100 MG) BY MOUTH DAILY 90 tablet 1     multivitamin,  "therapeutic with minerals (MULTI-VITAMIN) TABS Take 1 tablet by mouth daily       triamterene-HCTZ (MAXZIDE-25) 37.5-25 MG tablet TAKE 1 TABLET BY MOUTH DAILY 90 tablet 1     Allergies   Allergen Reactions     Codeine Itching     Pneumonia Vaccine:Adults age 65+ who received their first dose of Pneumovax (PPSV23) prior to age 65 years: Should be given PCV 13 > 1 year after their most recent PPSV23 AND should be given a another dose of PPSV23 > 5 years after their most recent dose of PPSV23  Mammogram Screening: Mammogram Screening: Recommended mammography every 1-2 years with patient discussion and risk factor consideration  History of abnormal Pap smear: NO - age 65 - see link Cervical Cytology Screening Guidelines  Last 3 Pap and HPV Results:   PAP / HPV 11/10/2015   PAP (Historical) NIL     Any new diagnosis of family breast, ovarian, or bowel cancer? No    FHS-7: No flowsheet data found.  Mammogram Screening: Recommended mammography every 1-2 years with patient discussion and risk factor consideration  Pertinent mammograms are reviewed under the imaging tab.    Review of Systems  Constitutional, HEENT, cardiovascular, pulmonary, GI, , musculoskeletal, neuro, skin, endocrine and psych systems are negative, except as otherwise noted.    OBJECTIVE:   /80 (BP Location: Right arm, Patient Position: Chair, Cuff Size: Adult Large)   Pulse 54   Temp 97.3  F (36.3  C) (Tympanic)   Resp 16   Ht 1.499 m (4' 11\")   Wt 74.3 kg (163 lb 12.8 oz)   SpO2 98%   BMI 33.08 kg/m   Estimated body mass index is 33.08 kg/m  as calculated from the following:    Height as of this encounter: 1.499 m (4' 11\").    Weight as of this encounter: 74.3 kg (163 lb 12.8 oz).  Physical Exam  GENERAL APPEARANCE: healthy, alert and no distress  EYES: Eyes grossly normal to inspection, PERRL and conjunctivae and sclerae normal  HENT: ear canals and TM's normal, nose and mouth without ulcers or lesions, oropharynx clear and oral mucous " membranes moist  NECK: no adenopathy, no asymmetry, masses, or scars and thyroid normal to palpation  RESP: lungs clear to auscultation - no rales, rhonchi or wheezes  BREAST: normal without masses, tenderness or nipple discharge and no palpable axillary masses or adenopathy  CV: regular rate and rhythm, normal S1 S2, no S3 or S4, no murmur, click or rub, no peripheral edema and peripheral pulses strong  ABDOMEN: soft, nontender, no hepatosplenomegaly, no masses and bowel sounds normal   (female): normal female external genitalia, normal urethral meatus, vaginal mucosal atrophy noted, normal cervix, adnexae, and uterus without masses or abnormal discharge  MS: no musculoskeletal defects are noted and gait is age appropriate without ataxia  SKIN: no suspicious lesions or rashes  NEURO: Normal strength and tone, sensory exam grossly normal, mentation intact and speech normal  PSYCH: mentation appears normal and affect normal/bright    Diagnostic Test Results:  Labs reviewed in Epic  Results for orders placed or performed in visit on 09/07/21   Lipid Profile (Chol, Trig, HDL, LDL calc)     Status: Abnormal   Result Value Ref Range    Cholesterol 190 <200 mg/dL    Triglycerides 119 <150 mg/dL    Direct Measure HDL 46 (L) >=50 mg/dL    LDL Cholesterol Calculated 120 (H) <=100 mg/dL    Non HDL Cholesterol 144 (H) <130 mg/dL    Patient Fasting > 8hrs? Yes    Extra Tube     Status: None (In process)    Narrative    The following orders were created for panel order Extra Tube.  Procedure                               Abnormality         Status                     ---------                               -----------         ------                     Extra Purple Top Tube[[id number]]                            In process                   Please view results for these tests on the individual orders.       ASSESSMENT / PLAN:   (Z00.00) Routine general medical examination at a health care facility  (primary encounter  "diagnosis)  Comment:   Plan: follow-up 1 year    (G56.03) Bilateral carpal tunnel syndrome  Comment:   Plan: Orthopedic  Referral        Needs to have surgery    (Z23) Need for pneumococcal vaccine  Comment:   Plan: PNEUMOCOCCAL CONJ VACCINE 13 VALENT IM        UTD now    (L82.0) Inflamed seborrheic keratosis  Comment:   Plan: discussed benign nature    (E78.5) Hyperlipidemia LDL goal <130  Comment:   Plan: Comprehensive metabolic panel        Fasting today looks great!    (D47.3) Essential thrombocytosis (H)  Comment:   Plan: CBC with platelets        Likely was second to inflammation prior to knee surgery    (B37.2) Candidiasis of skin  Comment:   Plan: nystatin (MYCOSTATIN) [id number] UNIT/GM external         powder        Left breast       COUNSELING:  Reviewed preventive health counseling, as reflected in patient instructions  Special attention given to:       Regular exercise       Healthy diet/nutrition       Vision screening       Hearing screening       Dental care       Advanced Planning     Estimated body mass index is 33.08 kg/m  as calculated from the following:    Height as of this encounter: 1.499 m (4' 11\").    Weight as of this encounter: 74.3 kg (163 lb 12.8 oz).    Weight management plan: Discussed healthy diet and exercise guidelines    She reports that she has never smoked. She has never used smokeless tobacco.      Appropriate preventive services were discussed with this patient, including applicable screening as appropriate for cardiovascular disease, diabetes, osteopenia/osteoporosis, and glaucoma.  As appropriate for age/gender, discussed screening for colorectal cancer, prostate cancer, breast cancer, and cervical cancer. Checklist reviewing preventive services available has been given to the patient.    Reviewed patients plan of care and provided an AVS. The Intermediate Care Plan ( asthma action plan, low back pain action plan, and migraine action plan) for Su meets the Care " Plan requirement. This Care Plan has been established and reviewed with the Patient.    Counseling Resources:  ATP IV Guidelines  Pooled Cohorts Equation Calculator  Breast Cancer Risk Calculator  Breast Cancer: Medication to Reduce Risk  FRAX Risk Assessment  ICSI Preventive Guidelines  Dietary Guidelines for Americans, 2010  USDA's MyPlate  ASA Prophylaxis  Lung CA Screening    Katalina Neville MD  [facility] [place]    Identified Health Risks:

## 2021-09-03 NOTE — PATIENT INSTRUCTIONS
Preventive Health Recommendations    See your health care provider every year to    Review health changes.     Discuss preventive care.      Review your medicines if your doctor has prescribed any.      You no longer need a yearly Pap test unless you've had an abnormal Pap test in the past 10 years. If you have vaginal symptoms, such as bleeding or discharge, be sure to talk with your provider about a Pap test.      Every 1 to 2 years, have a mammogram.  If you are over 69, talk with your health care provider about whether or not you want to continue having screening mammograms.      Every 10 years, have a colonoscopy. Or, have a yearly FIT test (stool test). These exams will check for colon cancer.       Have a cholesterol test every 5 years, or more often if your doctor advises it.       Have a diabetes test (fasting glucose) every three years. If you are at risk for diabetes, you should have this test more often.       At age 65, have a bone density scan (DEXA) to check for osteoporosis (brittle bone disease).    Shots:    Get a flu shot each year.    Get a tetanus shot every 10 years.    Talk to your doctor about your pneumonia vaccines. There are now two you should receive - Pneumovax (PPSV 23) and Prevnar (PCV 13).    Talk to your pharmacist about the shingles vaccine.    Talk to your doctor about the hepatitis B vaccine.    Nutrition:     Eat at least 5 servings of fruits and vegetables each day.      Eat whole-grain bread, whole-wheat pasta and brown rice instead of white grains and rice.      Get adequate about Calcium and Vitamin D.     Lifestyle    Exercise at least 150 minutes a week (30 minutes a day, 5 days a week). This will help you control your weight and prevent disease.      Limit alcohol to one drink per day.      No smoking.       Wear sunscreen to prevent skin cancer.       See your dentist twice a year for an exam and cleaning.      See your eye doctor every 1 to 2 years to screen for  conditions such as glaucoma, macular degeneration, cataracts, etc.    Personalized Prevention Plan  You are due for the preventive services outlined below.  Your care team is available to assist you in scheduling these services.  If you have already completed any of these items, please share that information with your care team to update in your medical record.    Health Maintenance Due   Topic Date Due     Zoster (Shingles) Vaccine (1 of 2) Never done     Asthma Control Test  11/29/2019     Discuss Advance Care Planning  01/06/2020     Annual Wellness Visit  05/29/2020     PHQ-2  01/01/2021     Flu Vaccine (1) 09/01/2021     Patient Education   Personalized Prevention Plan  You are due for the preventive services outlined below.  Your care team is available to assist you in scheduling these services.  If you have already completed any of these items, please share that information with your care team to update in your medical record.  Health Maintenance Due   Topic Date Due     Zoster (Shingles) Vaccine (1 of 2) Never done     Asthma Control Test  11/29/2019     Flu Vaccine (1) 09/01/2021

## 2021-09-05 ASSESSMENT — ACTIVITIES OF DAILY LIVING (ADL): CURRENT_FUNCTION: NO ASSISTANCE NEEDED

## 2021-09-07 ENCOUNTER — LAB (OUTPATIENT)
Dept: LAB | Facility: OTHER | Age: 66
End: 2021-09-07
Payer: COMMERCIAL

## 2021-09-07 ENCOUNTER — OFFICE VISIT (OUTPATIENT)
Dept: FAMILY MEDICINE | Facility: OTHER | Age: 66
End: 2021-09-07
Attending: FAMILY MEDICINE
Payer: MEDICARE

## 2021-09-07 VITALS
HEART RATE: 54 BPM | DIASTOLIC BLOOD PRESSURE: 80 MMHG | RESPIRATION RATE: 16 BRPM | SYSTOLIC BLOOD PRESSURE: 122 MMHG | OXYGEN SATURATION: 98 % | BODY MASS INDEX: 33.02 KG/M2 | WEIGHT: 163.8 LBS | HEIGHT: 59 IN | TEMPERATURE: 97.3 F

## 2021-09-07 DIAGNOSIS — B37.2 CANDIDIASIS OF SKIN: ICD-10-CM

## 2021-09-07 DIAGNOSIS — Z00.00 ROUTINE GENERAL MEDICAL EXAMINATION AT A HEALTH CARE FACILITY: Primary | ICD-10-CM

## 2021-09-07 DIAGNOSIS — Z23 NEED FOR PNEUMOCOCCAL VACCINE: ICD-10-CM

## 2021-09-07 DIAGNOSIS — L82.0 INFLAMED SEBORRHEIC KERATOSIS: ICD-10-CM

## 2021-09-07 DIAGNOSIS — G56.03 BILATERAL CARPAL TUNNEL SYNDROME: ICD-10-CM

## 2021-09-07 DIAGNOSIS — E78.5 HYPERLIPIDEMIA LDL GOAL <130: ICD-10-CM

## 2021-09-07 DIAGNOSIS — D47.3 ESSENTIAL THROMBOCYTOSIS (H): ICD-10-CM

## 2021-09-07 LAB
ALBUMIN SERPL-MCNC: 3.9 G/DL (ref 3.4–5)
ALP SERPL-CCNC: 97 U/L (ref 40–150)
ALT SERPL W P-5'-P-CCNC: 39 U/L (ref 0–50)
ANION GAP SERPL CALCULATED.3IONS-SCNC: 5 MMOL/L (ref 3–14)
AST SERPL W P-5'-P-CCNC: 25 U/L (ref 0–45)
BILIRUB SERPL-MCNC: 0.6 MG/DL (ref 0.2–1.3)
BUN SERPL-MCNC: 16 MG/DL (ref 7–30)
CALCIUM SERPL-MCNC: 9.7 MG/DL (ref 8.5–10.1)
CHLORIDE BLD-SCNC: 105 MMOL/L (ref 94–109)
CHOLEST SERPL-MCNC: 190 MG/DL
CO2 SERPL-SCNC: 28 MMOL/L (ref 20–32)
CREAT SERPL-MCNC: 0.78 MG/DL (ref 0.52–1.04)
ERYTHROCYTE [DISTWIDTH] IN BLOOD BY AUTOMATED COUNT: 12.7 % (ref 10–15)
FASTING STATUS PATIENT QL REPORTED: YES
GFR SERPL CREATININE-BSD FRML MDRD: 79 ML/MIN/1.73M2
GLUCOSE BLD-MCNC: 90 MG/DL (ref 70–99)
HCT VFR BLD AUTO: 45 % (ref 35–47)
HDLC SERPL-MCNC: 46 MG/DL
HGB BLD-MCNC: 15.4 G/DL (ref 11.7–15.7)
HOLD SPECIMEN: NORMAL
LDLC SERPL CALC-MCNC: 120 MG/DL
MCH RBC QN AUTO: 29.3 PG (ref 26.5–33)
MCHC RBC AUTO-ENTMCNC: 34.2 G/DL (ref 31.5–36.5)
MCV RBC AUTO: 86 FL (ref 78–100)
NONHDLC SERPL-MCNC: 144 MG/DL
PLATELET # BLD AUTO: 401 10E3/UL (ref 150–450)
POTASSIUM BLD-SCNC: 3.5 MMOL/L (ref 3.4–5.3)
PROT SERPL-MCNC: 7.8 G/DL (ref 6.8–8.8)
RBC # BLD AUTO: 5.26 10E6/UL (ref 3.8–5.2)
SODIUM SERPL-SCNC: 138 MMOL/L (ref 133–144)
TRIGL SERPL-MCNC: 119 MG/DL
WBC # BLD AUTO: 6.8 10E3/UL (ref 4–11)

## 2021-09-07 PROCEDURE — 82465 ASSAY BLD/SERUM CHOLESTEROL: CPT | Mod: ZL

## 2021-09-07 PROCEDURE — 82040 ASSAY OF SERUM ALBUMIN: CPT | Mod: ZL | Performed by: FAMILY MEDICINE

## 2021-09-07 PROCEDURE — G0009 ADMIN PNEUMOCOCCAL VACCINE: HCPCS

## 2021-09-07 PROCEDURE — 99397 PER PM REEVAL EST PAT 65+ YR: CPT | Performed by: FAMILY MEDICINE

## 2021-09-07 PROCEDURE — G0463 HOSPITAL OUTPT CLINIC VISIT: HCPCS | Mod: 25

## 2021-09-07 PROCEDURE — G0463 HOSPITAL OUTPT CLINIC VISIT: HCPCS

## 2021-09-07 PROCEDURE — 85027 COMPLETE CBC AUTOMATED: CPT | Mod: ZL | Performed by: FAMILY MEDICINE

## 2021-09-07 PROCEDURE — 36415 COLL VENOUS BLD VENIPUNCTURE: CPT | Mod: ZL

## 2021-09-07 RX ORDER — NYSTATIN 100000 [USP'U]/G
POWDER TOPICAL 3 TIMES DAILY
Qty: 45 G | Refills: 0 | Status: SHIPPED | OUTPATIENT
Start: 2021-09-07 | End: 2022-12-12

## 2021-09-07 SDOH — HEALTH STABILITY: PHYSICAL HEALTH: ON AVERAGE, HOW MANY DAYS PER WEEK DO YOU ENGAGE IN MODERATE TO STRENUOUS EXERCISE (LIKE A BRISK WALK)?: 7 DAYS

## 2021-09-07 SDOH — HEALTH STABILITY: PHYSICAL HEALTH: ON AVERAGE, HOW MANY MINUTES DO YOU ENGAGE IN EXERCISE AT THIS LEVEL?: 50 MIN

## 2021-09-07 ASSESSMENT — SOCIAL DETERMINANTS OF HEALTH (SDOH)
WITHIN THE LAST YEAR, HAVE YOU BEEN KICKED, HIT, SLAPPED, OR OTHERWISE PHYSICALLY HURT BY YOUR PARTNER OR EX-PARTNER?: NO
WITHIN THE LAST YEAR, HAVE YOU BEEN AFRAID OF YOUR PARTNER OR EX-PARTNER?: NO
WITHIN THE LAST YEAR, HAVE TO BEEN RAPED OR FORCED TO HAVE ANY KIND OF SEXUAL ACTIVITY BY YOUR PARTNER OR EX-PARTNER?: NO
WITHIN THE LAST YEAR, HAVE YOU BEEN HUMILIATED OR EMOTIONALLY ABUSED IN OTHER WAYS BY YOUR PARTNER OR EX-PARTNER?: NO

## 2021-09-07 ASSESSMENT — MIFFLIN-ST. JEOR: SCORE: 1188.62

## 2021-09-07 ASSESSMENT — LIFESTYLE VARIABLES
HOW OFTEN DO YOU HAVE A DRINK CONTAINING ALCOHOL: 2-4 TIMES A MONTH
HOW MANY STANDARD DRINKS CONTAINING ALCOHOL DO YOU HAVE ON A TYPICAL DAY: 1 OR 2

## 2021-09-07 ASSESSMENT — ACTIVITIES OF DAILY LIVING (ADL): CURRENT_FUNCTION: NO ASSISTANCE NEEDED

## 2021-09-07 ASSESSMENT — PAIN SCALES - GENERAL: PAINLEVEL: NO PAIN (0)

## 2021-09-07 NOTE — NURSING NOTE
"Chief Complaint   Patient presents with     Physical       Initial /80 (BP Location: Right arm, Patient Position: Chair, Cuff Size: Adult Large)   Pulse 54   Temp 97.3  F (36.3  C) (Tympanic)   Resp 16   Ht 1.499 m (4' 11\")   Wt 74.3 kg (163 lb 12.8 oz)   SpO2 98%   BMI 33.08 kg/m   Estimated body mass index is 33.08 kg/m  as calculated from the following:    Height as of this encounter: 1.499 m (4' 11\").    Weight as of this encounter: 74.3 kg (163 lb 12.8 oz).  Medication Reconciliation: complete  Joaquina Palm LPN    "

## 2021-10-03 ENCOUNTER — HEALTH MAINTENANCE LETTER (OUTPATIENT)
Age: 66
End: 2021-10-03

## 2021-10-28 ENCOUNTER — TRANSFERRED RECORDS (OUTPATIENT)
Dept: HEALTH INFORMATION MANAGEMENT | Facility: CLINIC | Age: 66
End: 2021-10-28

## 2021-11-12 DIAGNOSIS — I10 BENIGN ESSENTIAL HYPERTENSION: ICD-10-CM

## 2021-11-12 RX ORDER — TRIAMTERENE/HYDROCHLOROTHIAZID 37.5-25 MG
1 TABLET ORAL DAILY
Qty: 90 TABLET | Refills: 1 | Status: SHIPPED | OUTPATIENT
Start: 2021-11-12 | End: 2022-03-02

## 2021-11-12 RX ORDER — METOPROLOL SUCCINATE 100 MG/1
TABLET, EXTENDED RELEASE ORAL
Qty: 90 TABLET | Refills: 1 | Status: SHIPPED | OUTPATIENT
Start: 2021-11-12 | End: 2022-03-02

## 2021-11-12 NOTE — TELEPHONE ENCOUNTER
metoprolol      Last Written Prescription Date:  5/10/21  Last Fill Quantity: 90,   # refills: 1  Last Office Visit: 9/7/21  Future Office visit:    Next 5 appointments (look out 90 days)    Nov 15, 2021  4:00 PM  (Arrive by 3:45 PM)  Pre-Op physical with Katalina Neville MD  [facility] [place] ([facility] [place] ) [address]  [place] [address]  [phone]         maxide      Last Written Prescription Date:  5/10/21  Last Fill Quantity: 90,   # refills: 1  Last Office Visit: 9/7/21  Future Office visit:    Next 5 appointments (look out 90 days)    Nov 15, 2021  4:00 PM  (Arrive by 3:45 PM)  Pre-Op physical with Katalina Neville MD  [facility] [place] ([facility] [place] ) [address]  [place] [address]  [phone]

## 2021-11-15 ENCOUNTER — TELEPHONE (OUTPATIENT)
Dept: FAMILY MEDICINE | Facility: OTHER | Age: 66
End: 2021-11-15

## 2021-11-15 ENCOUNTER — TELEPHONE (OUTPATIENT)
Dept: FAMILY MEDICINE | Facility: OTHER | Age: 66
End: 2021-11-15
Payer: COMMERCIAL

## 2021-11-15 ENCOUNTER — OFFICE VISIT (OUTPATIENT)
Dept: FAMILY MEDICINE | Facility: OTHER | Age: 66
End: 2021-11-15
Attending: FAMILY MEDICINE
Payer: COMMERCIAL

## 2021-11-15 VITALS
TEMPERATURE: 97.4 F | HEART RATE: 51 BPM | WEIGHT: 167 LBS | DIASTOLIC BLOOD PRESSURE: 74 MMHG | HEIGHT: 59 IN | OXYGEN SATURATION: 98 % | SYSTOLIC BLOOD PRESSURE: 132 MMHG | RESPIRATION RATE: 16 BRPM | BODY MASS INDEX: 33.67 KG/M2

## 2021-11-15 DIAGNOSIS — Z01.818 PREOP GENERAL PHYSICAL EXAM: ICD-10-CM

## 2021-11-15 LAB
ALBUMIN SERPL-MCNC: 3.7 G/DL (ref 3.4–5)
ALP SERPL-CCNC: 97 U/L (ref 40–150)
ALT SERPL W P-5'-P-CCNC: 38 U/L (ref 0–50)
ANION GAP SERPL CALCULATED.3IONS-SCNC: 4 MMOL/L (ref 3–14)
AST SERPL W P-5'-P-CCNC: 25 U/L (ref 0–45)
BILIRUB SERPL-MCNC: 0.6 MG/DL (ref 0.2–1.3)
BUN SERPL-MCNC: 21 MG/DL (ref 7–30)
CALCIUM SERPL-MCNC: 9.4 MG/DL (ref 8.5–10.1)
CHLORIDE BLD-SCNC: 105 MMOL/L (ref 94–109)
CO2 SERPL-SCNC: 29 MMOL/L (ref 20–32)
CREAT SERPL-MCNC: 0.71 MG/DL (ref 0.52–1.04)
ERYTHROCYTE [DISTWIDTH] IN BLOOD BY AUTOMATED COUNT: 13 % (ref 10–15)
GFR SERPL CREATININE-BSD FRML MDRD: 88 ML/MIN/1.73M2
GLUCOSE BLD-MCNC: 90 MG/DL (ref 70–99)
HCT VFR BLD AUTO: 42.6 % (ref 35–47)
HGB BLD-MCNC: 14.6 G/DL (ref 11.7–15.7)
MCH RBC QN AUTO: 29.5 PG (ref 26.5–33)
MCHC RBC AUTO-ENTMCNC: 34.3 G/DL (ref 31.5–36.5)
MCV RBC AUTO: 86 FL (ref 78–100)
PLATELET # BLD AUTO: 371 10E3/UL (ref 150–450)
POTASSIUM BLD-SCNC: 3.8 MMOL/L (ref 3.4–5.3)
PROT SERPL-MCNC: 7.6 G/DL (ref 6.8–8.8)
RBC # BLD AUTO: 4.95 10E6/UL (ref 3.8–5.2)
SODIUM SERPL-SCNC: 138 MMOL/L (ref 133–144)
WBC # BLD AUTO: 8.7 10E3/UL (ref 4–11)

## 2021-11-15 PROCEDURE — 93010 ELECTROCARDIOGRAM REPORT: CPT | Mod: 77 | Performed by: INTERNAL MEDICINE

## 2021-11-15 PROCEDURE — G0463 HOSPITAL OUTPT CLINIC VISIT: HCPCS

## 2021-11-15 PROCEDURE — 93005 ELECTROCARDIOGRAM TRACING: CPT

## 2021-11-15 PROCEDURE — 36415 COLL VENOUS BLD VENIPUNCTURE: CPT | Mod: ZL | Performed by: FAMILY MEDICINE

## 2021-11-15 PROCEDURE — 85027 COMPLETE CBC AUTOMATED: CPT | Mod: ZL | Performed by: FAMILY MEDICINE

## 2021-11-15 PROCEDURE — 99214 OFFICE O/P EST MOD 30 MIN: CPT | Performed by: FAMILY MEDICINE

## 2021-11-15 PROCEDURE — 82040 ASSAY OF SERUM ALBUMIN: CPT | Mod: ZL | Performed by: FAMILY MEDICINE

## 2021-11-15 ASSESSMENT — ASTHMA QUESTIONNAIRES
QUESTION_2 LAST FOUR WEEKS HOW OFTEN HAVE YOU HAD SHORTNESS OF BREATH: NOT AT ALL
ACUTE_EXACERBATION_TODAY: NO
QUESTION_1 LAST FOUR WEEKS HOW MUCH OF THE TIME DID YOUR ASTHMA KEEP YOU FROM GETTING AS MUCH DONE AT WORK, SCHOOL OR AT HOME: NONE OF THE TIME
QUESTION_3 LAST FOUR WEEKS HOW OFTEN DID YOUR ASTHMA SYMPTOMS (WHEEZING, COUGHING, SHORTNESS OF BREATH, CHEST TIGHTNESS OR PAIN) WAKE YOU UP AT NIGHT OR EARLIER THAN USUAL IN THE MORNING: NOT AT ALL
ACT_TOTALSCORE: 25
QUESTION_4 LAST FOUR WEEKS HOW OFTEN HAVE YOU USED YOUR RESCUE INHALER OR NEBULIZER MEDICATION (SUCH AS ALBUTEROL): NOT AT ALL
QUESTION_5 LAST FOUR WEEKS HOW WOULD YOU RATE YOUR ASTHMA CONTROL: COMPLETELY CONTROLLED

## 2021-11-15 ASSESSMENT — MIFFLIN-ST. JEOR: SCORE: 1198.14

## 2021-11-15 ASSESSMENT — PAIN SCALES - GENERAL: PAINLEVEL: EXTREME PAIN (9)

## 2021-11-15 NOTE — PROGRESS NOTES
[facility] [place]  [address]  [facility] [place] [address]  Phone: [phone]  Primary Provider: Katalina Watters  Pre-op Performing Provider: KATALINA WATTERS      PREOPERATIVE EVALUATION:  Today's date: 11/15/2021    Su Land is a 66 year old female who presents for a preoperative evaluation.    Surgical Information:  Surgery/Procedure: Carpal tunnel  Surgery Location: [facility]  Surgeon: Dr. Mclaughlin  Surgery Date: 12/1/21  Time of Surgery: TBD  Where patient plans to recover: At home with family  Fax number for surgical facility: [phone]    Type of Anesthesia Anticipated: to be determined    Assessment & Plan     The proposed surgical procedure is considered LOW risk.    Preop general physical exam  - EKG 12-lead complete w/read - (Clinic Performed)  - CBC with platelets; Future  - Comprehensive metabolic panel; Future  - CBC with platelets  - Comprehensive metabolic panel         Risks and Recommendations:  The patient has the following additional risks and recommendations for perioperative complications:   - No identified additional risk factors other than previously addressed    Medication Instructions:   - Beta Blockers: Continue taking on the day of surgery.   - Diuretics: HOLD on the day of surgery.    RECOMMENDATION:  APPROVAL GIVEN to proceed with proposed procedure, without further diagnostic evaluation.    Provider  Link to [facility] Help Grid :[id number]}    Subjective     HPI related to upcoming procedure: bilateral carpal tunnel, left worse than right    Preop Questions 11/9/2021   1. Have you ever had a heart attack or stroke? No   2. Have you ever had surgery on your heart or blood vessels, such as a stent placement, a coronary artery bypass, or surgery on an artery in your head, neck, heart, or legs? No   3. Do you have chest pain with activity? No   4. Do you have a history of  heart failure? No   5. Do you currently have a cold, bronchitis or symptoms of other infection?  No   6. Do you have a cough, shortness of breath, or wheezing? No   7. Do you or anyone in your family have previous history of blood clots? No   8. Do you or does anyone in your family have a serious bleeding problem such as prolonged bleeding following surgeries or cuts? No   9. Have you ever had problems with anemia or been told to take iron pills? No   10. Have you had any abnormal blood loss such as black, tarry or bloody stools, or abnormal vaginal bleeding? No   11. Have you ever had a blood transfusion? No   12. Are you willing to have a blood transfusion if it is medically needed before, during, or after your surgery? Yes   13. Have you or any of your relatives ever had problems with anesthesia? No   14. Do you have sleep apnea, excessive snoring or daytime drowsiness? No   15. Do you have any artifical heart valves or other implanted medical devices like a pacemaker, defibrillator, or continuous glucose monitor? No   16. Do you have artificial joints? YES - Knees   17. Are you allergic to latex? No       Health Care Directive:  Patient does not have a Health Care Directive or Living Will: Advance Directive received and scanned. Click on Code in the patient header to view.    Preoperative Review of :   reviewed - no record of controlled substances prescribed.      Status of Chronic Conditions:  See problem list for active medical problems.  Problems all longstanding and stable, except as noted/documented.  See ROS for pertinent symptoms related to these conditions.      Review of Systems  CONSTITUTIONAL: NEGATIVE for fever, chills, change in weight  INTEGUMENTARY/SKIN: NEGATIVE for worrisome rashes, moles or lesions  EYES: NEGATIVE for vision changes or irritation  ENT/MOUTH: NEGATIVE for ear, mouth and throat problems  RESP: NEGATIVE for significant cough or SOB  CV: NEGATIVE for chest pain, palpitations or peripheral edema  GI: NEGATIVE for nausea, abdominal pain, heartburn, or change in bowel  habits  : NEGATIVE for frequency, dysuria, or hematuria  MUSCULOSKELETAL: NEGATIVE for significant arthralgias or myalgia  NEURO: NEGATIVE for weakness, dizziness or paresthesias  ENDOCRINE: NEGATIVE for temperature intolerance, skin/hair changes  HEME: NEGATIVE for bleeding problems  PSYCHIATRIC: NEGATIVE for changes in mood or affect    Patient Active Problem List    Diagnosis Date Noted     Inflamed seborrheic keratosis 2021     Priority: Medium     Bilateral carpal tunnel syndrome 2021     Priority: Medium     Candidiasis of skin 2021     Priority: Medium     Hyperlipidemia LDL goal <130 2020     Priority: Medium     Post-menopausal 2020     Priority: Medium     Family history of thyroid disease 2020     Priority: Medium     Somatic dysfunction of cervical region 10/15/2018     Priority: Medium     Neck pain 10/15/2018     Priority: Medium     Benign essential hypertension 2018     Priority: Medium     Essential thrombocytosis (H) 03/15/2017     Priority: Medium     BPPV (benign paroxysmal positional vertigo) 2016     Priority: Medium     Swelling of right knee joint 2015     Priority: Medium      Past Medical History:   Diagnosis Date     Bilateral lower extremity edema     takes lasix prn     Bronchospasm     with viral URI     Cervicalgia      Essential thrombocytosis (H)     Confirmed w/BMBX -- takes baby ASA daily which is working/ Dr Krista Villalpando [facility]     Herpes zoster without complication 2016    got in early and got valtrex     Hyperplastic colonic polyp, unspecified part of colon 2017     Hypertension      Past Surgical History:   Procedure Laterality Date     APPENDECTOMY       COLONOSCOPY N/A 2017    due  WHOLE COLON COLONOSCOPY WITH POLYPECTOMY;  Surgeon: Isabelle Faulkner MD;  Location: [facility]     GYN SURGERY       x2     ORTHOPEDIC SURGERY  2013    left total knee replacement     ORTHOPEDIC SURGERY   10/28/2014    right total knee replacement     Current Outpatient Medications   Medication Sig Dispense Refill     albuterol (VENTOLIN HFA) 108 (90 Base) MCG/ACT inhaler INHALE 2 PUFFS INTO THE LUNGS EVERY 6 HOURS AS NEEDED FOR SHORTNESS OF BREATH OR DIFFICULT BREATHING OR WHEEZING 18 g 0     ASPIRIN PO Take 81 mg by mouth daily       cetirizine (ZYRTEC) 10 MG tablet TAKE 1 TABLET(10 MG) BY MOUTH DAILY 90 tablet 3     furosemide (LASIX) 20 MG tablet TAKE 1 TABLET BY MOUTH TWICE A WEEK IF NEEDED FOR LEG EDEMA 90 tablet 2     metoprolol succinate ER (TOPROL-XL) 100 MG 24 hr tablet TAKE 1 TABLET(100 MG) BY MOUTH DAILY 90 tablet 1     multivitamin, therapeutic with minerals (MULTI-VITAMIN) TABS Take 1 tablet by mouth daily       nystatin (MYCOSTATIN) 976218 UNIT/GM external powder Apply topically 3 times daily 45 g 0     triamterene-HCTZ (MAXZIDE-25) 37.5-25 MG tablet TAKE 1 TABLET BY MOUTH DAILY 90 tablet 1       Allergies   Allergen Reactions     Codeine Itching        Social History     Tobacco Use     Smoking status: Never Smoker     Smokeless tobacco: Never Used   Substance Use Topics     Alcohol use: Yes     Comment: occasional     Family History   Problem Relation Age of Onset     Hypertension Mother      Thyroid Disease Mother         hypothyroidism     Heart Disease Mother         myocardial infarction     C.A.D. Mother 70     Alzheimer Disease Mother      Other - See Comments Father 52        cerebral aneurysm; cause of death     Hypertension Sister      C.A.D. Sister      Thyroid Disease Sister         hypothyroid     Heart Disease Sister 59        MI      Hypertension Brother      Other - See Comments Brother         Stroke     Coronary Artery Disease Brother      Cerebral palsy Brother      Dementia Brother      Hypertension Brother      Peripheral Vascular Disease Brother         is a runner     Ulcerative Colitis Brother 66        getting IV infusions     Unknown/Adopted Maternal Grandmother           very young, cancer?     Suicide Maternal Grandfather      Heart Failure Paternal Grandmother         pneumonia     Heart Disease Paternal Grandfather 60     History   Drug Use No         Objective     There were no vitals taken for this visit.    Physical Exam    GENERAL APPEARANCE: healthy, alert and no distress     EYES: EOMI, PERRL     HENT: ear canals and TM's normal and nose and mouth without ulcers or lesions     NECK: no adenopathy, no asymmetry, masses, or scars and thyroid normal to palpation     RESP: lungs clear to auscultation - no rales, rhonchi or wheezes     CV: regular rates and rhythm, normal S1 S2, no S3 or S4 and no murmur, click or rub     ABDOMEN:  soft, nontender, no HSM or masses and bowel sounds normal     MS: extremities normal- no gross deformities noted, no evidence of inflammation in joints, FROM in all extremities.     SKIN: no suspicious lesions or rashes     NEURO: Normal strength and tone, sensory exam grossly normal, mentation intact and speech normal     PSYCH: mentation appears normal. and affect normal/bright     LYMPHATICS: No cervical adenopathy    Recent Labs   Lab Test 09/07/21  1106 09/07/21  1105 11/09/20  1200   HGB 15.4  --  15.3     --  384   NA  --  138 138   POTASSIUM  --  3.5 3.8   CR  --  0.78 0.65        Diagnostics:  Recent Results (from the past 24 hour(s))   CBC with platelets    Collection Time: 11/15/21 12:37 PM   Result Value Ref Range    WBC Count 8.7 4.0 - 11.0 10e3/uL    RBC Count 4.95 3.80 - 5.20 10e6/uL    Hemoglobin 14.6 11.7 - 15.7 g/dL    Hematocrit 42.6 35.0 - 47.0 %    MCV 86 78 - 100 fL    MCH 29.5 26.5 - 33.0 pg    MCHC 34.3 31.5 - 36.5 g/dL    RDW 13.0 10.0 - 15.0 %    Platelet Count 371 150 - 450 10e3/uL   Comprehensive metabolic panel    Collection Time: 11/15/21 12:37 PM   Result Value Ref Range    Sodium 138 133 - 144 mmol/L    Potassium 3.8 3.4 - 5.3 mmol/L    Chloride 105 94 - 109 mmol/L    Carbon Dioxide (CO2) 29 20 - 32 mmol/L     Anion Gap 4 3 - 14 mmol/L    Urea Nitrogen 21 7 - 30 mg/dL    Creatinine 0.71 0.52 - 1.04 mg/dL    Calcium 9.4 8.5 - 10.1 mg/dL    Glucose 90 70 - 99 mg/dL    Alkaline Phosphatase 97 40 - 150 U/L    AST 25 0 - 45 U/L    ALT 38 0 - 50 U/L    Protein Total 7.6 6.8 - 8.8 g/dL    Albumin 3.7 3.4 - 5.0 g/dL    Bilirubin Total 0.6 0.2 - 1.3 mg/dL    GFR Estimate 88 >60 mL/min/1.73m2      EKG: sinus bradycardia, normal axis, normal intervals, no acute ST/T changes c/w ischemia, no LVH by voltage criteria    Revised Cardiac Risk Index (RCRI):  The patient has the following serious cardiovascular risks for perioperative complications:   - No serious cardiac risks = 0 points     RCRI Interpretation: 0 points: Class I (very low risk - 0.4% complication rate)           Signed Electronically by: Katalina Neville MD  Copy of this evaluation report is provided to requesting physician.

## 2021-11-15 NOTE — TELEPHONE ENCOUNTER
LVM for return call need patient to come at 11:15 for clinic appointment today 11/15/21  Nicole Zavala LPN

## 2021-11-15 NOTE — LETTER
My Asthma Action Plan    Name: Su Land   YOB: 1955  Date: 11/15/2021   My doctor: Katalina Neville MD   [facility]        My Rescue Medicine:   Albuterol inhaler (Proair/Ventolin/Proventil HFA)  2-4 puffs EVERY 4 HOURS as needed. Use a spacer if recommended by your provider.   My Asthma Severity:   Only when has bronchitis   Know your asthma triggers: upper respiratory infections  None          GREEN ZONE   Good Control    I feel good    No cough or wheeze    Can work, sleep and play without asthma symptoms       Take your asthma control medicine every day.     1. If exercise triggers your asthma, take your rescue medication    15 minutes before exercise or sports, and    During exercise if you have asthma symptoms  2. Spacer to use with inhaler: If you have a spacer, make sure to use it with your inhaler             YELLOW ZONE Getting Worse  I have ANY of these:    I do not feel good    Cough or wheeze    Chest feels tight    Wake up at night   1. Keep taking your Green Zone medications  2. Start taking your rescue medicine:    every 20 minutes for up to 1 hour. Then every 4 hours for 24-48 hours.  3. If you stay in the Yellow Zone for more than 12-24 hours, contact your doctor.  4. If you do not return to the Green Zone in 12-24 hours or you get worse, start taking your oral steroid medicine if prescribed by your provider.           RED ZONE Medical Alert - Get Help  I have ANY of these:    I feel awful    Medicine is not helping    Breathing getting harder    Trouble walking or talking    Nose opens wide to breathe       1. Take your rescue medicine NOW  2. If your provider has prescribed an oral steroid medicine, start taking it NOW  3. Call your doctor NOW  4. If you are still in the Red Zone after 20 minutes and you have not reached your doctor:    Take your rescue medicine again and    Call 911 or go to the emergency room right away    See your  regular doctor within 2 weeks of an Emergency Room or Urgent Care visit for follow-up treatment.          Annual Reminders:  Meet with Asthma Educator,  Flu Shot in the Fall, consider Pneumonia Vaccination for patients with asthma (aged 19 and older).    Pharmacy: [id number] - [place] - [address] AT [facility] OF  [address]    Electronically signed by Katalina Neville MD   Date: 11/15/21                    Asthma Triggers  How To Control Things That Make Your Asthma Worse    Triggers are things that make your asthma worse.  Look at the list below to help you find your triggers and   what you can do about them. You can help prevent asthma flare-ups by staying away from your triggers.      Trigger                                                          What you can do   Cigarette Smoke  Tobacco smoke can make asthma worse. Do not allow smoking in your home, car or around you.  Be sure no one smokes at a child s day care or school.  If you smoke, ask your health care provider for ways to help you quit.  Ask family members to quit too.  Ask your health care provider for a referral to Quit Plan to help you quit smoking, or call 9-789-780-PLAN.     Colds, Flu, Bronchitis  These are common triggers of asthma. Wash your hands often.  Don t touch your eyes, nose or mouth.  Get a flu shot every year.     Dust Mites  These are tiny bugs that live in cloth or carpet. They are too small to see. Wash sheets and blankets in hot water every week.   Encase pillows and mattress in dust mite proof covers.  Avoid having carpet if you can. If you have carpet, vacuum weekly.   Use a dust mask and HEPA vacuum.   Pollen and Outdoor Mold  Some people are allergic to trees, grass, or weed pollen, or molds. Try to keep your windows closed.  Limit time out doors when pollen count is high.   Ask you health care provider about taking medicine during allergy season.     Animal Dander  Some people are allergic to skin  flakes, urine or saliva from pets with fur or feathers. Keep pets with fur or feathers out of your home.    If you can t keep the pet outdoors, then keep the pet out of your bedroom.  Keep the bedroom door closed.  Keep pets off cloth furniture and away from stuffed toys.     Mice, Rats, and Cockroaches  Some people are allergic to the waste from these pests.   Cover food and garbage.  Clean up spills and food crumbs.  Store grease in the refrigerator.   Keep food out of the bedroom.   Indoor Mold  This can be a trigger if your home has high moisture. Fix leaking faucets, pipes, or other sources of water.   Clean moldy surfaces.  Dehumidify basement if it is damp and smelly.   Smoke, Strong Odors, and Sprays  These can reduce air quality. Stay away from strong odors and sprays, such as perfume, powder, hair spray, paints, smoke incense, paint, cleaning products, candles and new carpet.   Exercise or Sports  Some people with asthma have this trigger. Be active!  Ask your doctor about taking medicine before sports or exercise to prevent symptoms.    Warm up for 5-10 minutes before and after sports or exercise.     Other Triggers of Asthma  Cold air:  Cover your nose and mouth with a scarf.  Sometimes laughing or crying can be a trigger.  Some medicines and food can trigger asthma.

## 2021-11-15 NOTE — PATIENT INSTRUCTIONS

## 2021-11-15 NOTE — NURSING NOTE
"Chief Complaint   Patient presents with     Pre-Op Exam       Initial /74 (BP Location: Right arm, Patient Position: Sitting, Cuff Size: Adult Regular)   Pulse 51   Temp 97.4  F (36.3  C) (Tympanic)   Resp 16   Ht 1.499 m (4' 11\")   Wt 75.8 kg (167 lb)   SpO2 98%   BMI 33.73 kg/m   Estimated body mass index is 33.73 kg/m  as calculated from the following:    Height as of this encounter: 1.499 m (4' 11\").    Weight as of this encounter: 75.8 kg (167 lb).  Medication Reconciliation: complete  Nicole Zavala LPN  "

## 2021-11-16 ASSESSMENT — ASTHMA QUESTIONNAIRES: ACT_TOTALSCORE: 25

## 2022-03-01 DIAGNOSIS — J45.909 BRONCHITIS, ALLERGIC, UNSPECIFIED ASTHMA SEVERITY, UNCOMPLICATED: ICD-10-CM

## 2022-03-01 DIAGNOSIS — I10 BENIGN ESSENTIAL HYPERTENSION: ICD-10-CM

## 2022-03-02 RX ORDER — METOPROLOL SUCCINATE 100 MG/1
TABLET, EXTENDED RELEASE ORAL
Qty: 90 TABLET | Refills: 2 | Status: SHIPPED | OUTPATIENT
Start: 2022-03-02 | End: 2022-12-08

## 2022-03-02 RX ORDER — CETIRIZINE HYDROCHLORIDE 10 MG/1
TABLET ORAL
Qty: 90 TABLET | Refills: 3 | Status: SHIPPED | OUTPATIENT
Start: 2022-03-02

## 2022-03-02 RX ORDER — TRIAMTERENE/HYDROCHLOROTHIAZID 37.5-25 MG
1 TABLET ORAL DAILY
Qty: 90 TABLET | Refills: 2 | Status: SHIPPED | OUTPATIENT
Start: 2022-03-02 | End: 2022-12-12

## 2022-03-02 NOTE — TELEPHONE ENCOUNTER
MAXIDE      Last Written Prescription Date:  11-  Last Fill Quantity: 90,   # refills: 1  Last Office Visit: 11-  Future Office visit:       Routing refill request to provider for review/approval because:  Drug not on the [facility], [facility] or [facility] refill protocol or controlled substance  TOPROL XL      Last Written Prescription Date:  11-  Last Fill Quantity: 90,   # refills: 1  Last Office Visit: 11-  Future Office visit:       Routing refill request to provider for review/approval because:

## 2022-03-02 NOTE — TELEPHONE ENCOUNTER
zyrtec      Last Written Prescription Date:  8/17/2020  Last Fill Quantity: 90,   # refills: 3  Last Office Visit: 11/15/21  Future Office visit:       
No

## 2022-09-03 DIAGNOSIS — I10 BENIGN ESSENTIAL HYPERTENSION: Primary | ICD-10-CM

## 2022-09-04 ENCOUNTER — HEALTH MAINTENANCE LETTER (OUTPATIENT)
Age: 67
End: 2022-09-04

## 2022-09-07 RX ORDER — TRIAMTERENE AND HYDROCHLOROTHIAZIDE 37.5; 25 MG/1; MG/1
CAPSULE ORAL
COMMUNITY
Start: 2022-09-03 | End: 2022-12-12

## 2022-09-07 RX ORDER — TRIAMTERENE AND HYDROCHLOROTHIAZIDE 37.5; 25 MG/1; MG/1
CAPSULE ORAL
Qty: 90 CAPSULE | Refills: 1 | Status: SHIPPED | OUTPATIENT
Start: 2022-09-07 | End: 2022-12-12

## 2022-09-07 NOTE — TELEPHONE ENCOUNTER
Dyazide       Last Written Prescription Date:  9/3/22(Reported)  Last Fill Quantity: 90,   # refills: 1  Last Office Visit: 11/15/21  Future Office visit:

## 2022-10-14 ENCOUNTER — MYC MEDICAL ADVICE (OUTPATIENT)
Dept: FAMILY MEDICINE | Facility: OTHER | Age: 67
End: 2022-10-14

## 2022-10-14 NOTE — TELEPHONE ENCOUNTER
Completed via [employer]. No concerns noted at present with NEGATIVE screening total score <3.     PHQ-2 Score:     PHQ-2 ( 1999 Pfizer) 10/14/2022 9/5/2021   Q1: Little interest or pleasure in doing things 0 0   Q2: Feeling down, depressed or hopeless 0 0   PHQ-2 Score 0 0   PHQ-2 Total Score (12-17 Years)- Positive if 3 or more points; Administer PHQ-A if positive - 0   Q1: Little interest or pleasure in doing things Not at all Not at all   Q2: Feeling down, depressed or hopeless Not at all Not at all   PHQ-2 Score 0 0     Leslie Balderrama, RN

## 2022-12-03 DIAGNOSIS — I10 BENIGN ESSENTIAL HYPERTENSION: ICD-10-CM

## 2022-12-06 NOTE — TELEPHONE ENCOUNTER
metoprolol      Last Written Prescription Date:  3/2/22  Last Fill Quantity: 90,   # refills: 2  Last Office Visit: 11/15/21  Future Office visit:    Next 5 appointments (look out 90 days)    Dec 12, 2022 10:15 AM  (Arrive by 10:00 AM)  SHORT with Katalina Neville MD  [facility] [place] ([facility] [place] ) [address]  [place] [address]  [phone]   Feb 13, 2023  2:00 PM  (Arrive by 1:45 PM)  PHYSICAL with Katalina Neville MD  [facility] [place] ([facility] [place] ) [address]  [place] [address]  [phone]

## 2022-12-08 ENCOUNTER — MYC REFILL (OUTPATIENT)
Dept: FAMILY MEDICINE | Facility: OTHER | Age: 67
End: 2022-12-08

## 2022-12-08 DIAGNOSIS — I10 BENIGN ESSENTIAL HYPERTENSION: ICD-10-CM

## 2022-12-08 RX ORDER — METOPROLOL SUCCINATE 100 MG/1
TABLET, EXTENDED RELEASE ORAL
Qty: 180 TABLET | Refills: 0 | Status: SHIPPED | OUTPATIENT
Start: 2022-12-08 | End: 2022-12-08

## 2022-12-09 RX ORDER — METOPROLOL SUCCINATE 100 MG/1
100 TABLET, EXTENDED RELEASE ORAL DAILY
Qty: 180 TABLET | Refills: 0 | Status: SHIPPED | OUTPATIENT
Start: 2022-12-09 | End: 2022-12-12

## 2022-12-09 NOTE — TELEPHONE ENCOUNTER
Metoprolol succinate ER      Last Written Prescription Date:  12/8/22  Last Fill Quantity: 180,   # refills: 0  Last Office Visit: 11/15/21  Future Office visit:    Next 5 appointments (look out 90 days)    Dec 12, 2022 10:15 AM  (Arrive by 10:00 AM)  SHORT with Katalina Neville MD  [facility] [place] ([facility] [place] ) [address]  [place] [address]  [phone]   Feb 13, 2023  2:00 PM  (Arrive by 1:45 PM)  PHYSICAL with Katalina Neville MD  [facility] [place] ([facility] [place] ) [address]  [place] [address]  [phone]           Routing refill request to provider for review/approval because:

## 2022-12-12 ENCOUNTER — OFFICE VISIT (OUTPATIENT)
Dept: FAMILY MEDICINE | Facility: OTHER | Age: 67
End: 2022-12-12
Attending: FAMILY MEDICINE
Payer: MEDICARE

## 2022-12-12 ENCOUNTER — LAB (OUTPATIENT)
Dept: LAB | Facility: OTHER | Age: 67
End: 2022-12-12
Payer: COMMERCIAL

## 2022-12-12 VITALS
TEMPERATURE: 97 F | BODY MASS INDEX: 30.04 KG/M2 | WEIGHT: 153 LBS | HEART RATE: 49 BPM | HEIGHT: 60 IN | SYSTOLIC BLOOD PRESSURE: 122 MMHG | DIASTOLIC BLOOD PRESSURE: 70 MMHG | OXYGEN SATURATION: 99 %

## 2022-12-12 DIAGNOSIS — E78.5 HYPERLIPIDEMIA LDL GOAL <130: ICD-10-CM

## 2022-12-12 DIAGNOSIS — I10 BENIGN ESSENTIAL HYPERTENSION: Primary | ICD-10-CM

## 2022-12-12 DIAGNOSIS — F43.21 GRIEF: ICD-10-CM

## 2022-12-12 DIAGNOSIS — D47.3 ESSENTIAL THROMBOCYTOSIS (H): ICD-10-CM

## 2022-12-12 LAB
ALBUMIN SERPL BCG-MCNC: 4.3 G/DL (ref 3.5–5.2)
ALP SERPL-CCNC: 106 U/L (ref 35–104)
ALT SERPL W P-5'-P-CCNC: 26 U/L (ref 10–35)
ANION GAP SERPL CALCULATED.3IONS-SCNC: 9 MMOL/L (ref 7–15)
AST SERPL W P-5'-P-CCNC: 23 U/L (ref 10–35)
BILIRUB SERPL-MCNC: 0.5 MG/DL
BUN SERPL-MCNC: 13.3 MG/DL (ref 8–23)
CALCIUM SERPL-MCNC: 10.1 MG/DL (ref 8.8–10.2)
CHLORIDE SERPL-SCNC: 101 MMOL/L (ref 98–107)
CHOLEST SERPL-MCNC: 210 MG/DL
CREAT SERPL-MCNC: 0.73 MG/DL (ref 0.51–0.95)
DEPRECATED HCO3 PLAS-SCNC: 31 MMOL/L (ref 22–29)
ERYTHROCYTE [DISTWIDTH] IN BLOOD BY AUTOMATED COUNT: 12.9 % (ref 10–15)
GFR SERPL CREATININE-BSD FRML MDRD: 89 ML/MIN/1.73M2
GLUCOSE SERPL-MCNC: 97 MG/DL (ref 70–99)
HCT VFR BLD AUTO: 46 % (ref 35–47)
HDLC SERPL-MCNC: 48 MG/DL
HGB BLD-MCNC: 15.5 G/DL (ref 11.7–15.7)
HOLD SPECIMEN: NORMAL
LDLC SERPL CALC-MCNC: 127 MG/DL
MCH RBC QN AUTO: 29.4 PG (ref 26.5–33)
MCHC RBC AUTO-ENTMCNC: 33.7 G/DL (ref 31.5–36.5)
MCV RBC AUTO: 87 FL (ref 78–100)
NONHDLC SERPL-MCNC: 162 MG/DL
PLATELET # BLD AUTO: 383 10E3/UL (ref 150–450)
POTASSIUM SERPL-SCNC: 3.9 MMOL/L (ref 3.4–5.3)
PROT SERPL-MCNC: 7.5 G/DL (ref 6.4–8.3)
RBC # BLD AUTO: 5.27 10E6/UL (ref 3.8–5.2)
SODIUM SERPL-SCNC: 141 MMOL/L (ref 136–145)
TRIGL SERPL-MCNC: 176 MG/DL
WBC # BLD AUTO: 6.5 10E3/UL (ref 4–11)

## 2022-12-12 PROCEDURE — 85027 COMPLETE CBC AUTOMATED: CPT | Mod: ZL | Performed by: FAMILY MEDICINE

## 2022-12-12 PROCEDURE — 80061 LIPID PANEL: CPT | Mod: ZL

## 2022-12-12 PROCEDURE — 80053 COMPREHEN METABOLIC PANEL: CPT | Mod: ZL

## 2022-12-12 PROCEDURE — 36415 COLL VENOUS BLD VENIPUNCTURE: CPT | Mod: ZL

## 2022-12-12 PROCEDURE — G0463 HOSPITAL OUTPT CLINIC VISIT: HCPCS

## 2022-12-12 PROCEDURE — 99214 OFFICE O/P EST MOD 30 MIN: CPT | Performed by: FAMILY MEDICINE

## 2022-12-12 RX ORDER — METOPROLOL SUCCINATE 100 MG/1
100 TABLET, EXTENDED RELEASE ORAL DAILY
Qty: 90 TABLET | Refills: 3 | Status: SHIPPED | OUTPATIENT
Start: 2022-12-12 | End: 2023-12-04

## 2022-12-12 RX ORDER — TRIAMTERENE AND HYDROCHLOROTHIAZIDE 37.5; 25 MG/1; MG/1
CAPSULE ORAL
Qty: 90 CAPSULE | Refills: 3 | Status: SHIPPED | OUTPATIENT
Start: 2022-12-12 | End: 2023-12-04

## 2022-12-12 ASSESSMENT — ASTHMA QUESTIONNAIRES
QUESTION_1 LAST FOUR WEEKS HOW MUCH OF THE TIME DID YOUR ASTHMA KEEP YOU FROM GETTING AS MUCH DONE AT WORK, SCHOOL OR AT HOME: NONE OF THE TIME
ACUTE_EXACERBATION_TODAY: NO
QUESTION_5 LAST FOUR WEEKS HOW WOULD YOU RATE YOUR ASTHMA CONTROL: COMPLETELY CONTROLLED
ACT_TOTALSCORE: 25
QUESTION_3 LAST FOUR WEEKS HOW OFTEN DID YOUR ASTHMA SYMPTOMS (WHEEZING, COUGHING, SHORTNESS OF BREATH, CHEST TIGHTNESS OR PAIN) WAKE YOU UP AT NIGHT OR EARLIER THAN USUAL IN THE MORNING: NOT AT ALL
QUESTION_4 LAST FOUR WEEKS HOW OFTEN HAVE YOU USED YOUR RESCUE INHALER OR NEBULIZER MEDICATION (SUCH AS ALBUTEROL): NOT AT ALL
QUESTION_2 LAST FOUR WEEKS HOW OFTEN HAVE YOU HAD SHORTNESS OF BREATH: NOT AT ALL
ACT_TOTALSCORE: 25

## 2022-12-12 ASSESSMENT — PAIN SCALES - GENERAL: PAINLEVEL: NO PAIN (0)

## 2022-12-12 NOTE — PROGRESS NOTES
Assessment & Plan     Benign essential hypertension  Refill meds, follow-up 1 year, sooner if problems  - metoprolol succinate ER (TOPROL XL) 100 MG 24 hr tablet; Take 1 tablet (100 mg) by mouth daily  - triamterene-HCTZ (DYAZIDE) 37.5-25 MG capsule; TAKE ONE TABLET BY MOUTH EVERY DAY Strength: 37.5-25 mg    Hyperlipidemia LDL goal <130  The 10-year ASCVD risk score (Bairon MIRELES, et al., 2019) is: 9.9%    Values used to calculate the score:      Age: 67 years      Sex: Female      Is Non- : No      Diabetic: No      Tobacco smoker: No      Systolic Blood Pressure: 122 mmHg      Is BP treated: Yes      HDL Cholesterol: 48 mg/dL      Total Cholesterol: 210 mg/dL  Declines meds, going to work on lifestyle changes  - Comprehensive metabolic panel; Future  - Lipid Profile (Chol, Trig, HDL, LDL calc); Future    Essential thrombocytosis (H)  Only once which was pre-TKR (inflammatory response?)  But will recheck  - CBC with platelets    Daughter going through treatment for liver cancer at age 36, discussed grieving ongoing, offered support    Due for dexa next year    Provider  Link to [facility] Help Grid :[id number]}     BMI:   Estimated body mass index is 29.88 kg/m  as calculated from the following:    Height as of this encounter: 1.524 m (5').    Weight as of this encounter: 69.4 kg (153 lb).   Weight management plan: Discussed healthy diet and exercise guidelines    Patient was agreeable to this plan and had no further questions.  There are no Patient Instructions on file for this visit.    No follow-ups on file.    Katalina Neville MD  [facility] - MYKEL Blue is a 67 year old, presenting for the following health issues:  Recheck Medication      HPI     Hyperlipidemia Follow-Up      Are you regularly taking any medication or supplement to lower your cholesterol?   No    Are you having muscle aches or other side effects that you think could be caused by your cholesterol  lowering medication?  No    Hypertension Follow-up      Do you check your blood pressure regularly outside of the clinic? Yes     Are you following a low salt diet? Yes    Are your blood pressures ever more than 140 on the top number (systolic) OR more   than 90 on the bottom number (diastolic), for example 140/90? No      How many servings of fruits and vegetables do you eat daily?  2-3    On average, how many sweetened beverages do you drink each day (Examples: soda, juice, sweet tea, etc.  Do NOT count diet or artificially sweetened beverages)?   1    How many days per week do you exercise enough to make your heart beat faster? 7    How many minutes a day do you exercise enough to make your heart beat faster? 30 - 60    How many days per week do you miss taking your medication? 0      Review of Systems   Constitutional, HEENT, cardiovascular, pulmonary, gi and gu systems are negative, except as otherwise noted.      Objective    /70   Pulse (!) 49   Temp 97  F (36.1  C)   Ht 1.524 m (5')   Wt 69.4 kg (153 lb)   SpO2 99%   BMI 29.88 kg/m    Body mass index is 29.88 kg/m .  Physical Exam   GENERAL: healthy, alert and no distress  NECK: no adenopathy, no asymmetry, masses, or scars and thyroid normal to palpation  RESP: lungs clear to auscultation - no rales, rhonchi or wheezes  CV: regular rate and rhythm, normal S1 S2, no S3 or S4, no murmur, click or rub, no peripheral edema and peripheral pulses strong  ABDOMEN: soft, nontender, no hepatosplenomegaly, no masses and bowel sounds normal  MS: no gross musculoskeletal defects noted, no edema  PSYCH: mentation appears normal, affect normal/bright    Results for orders placed or performed in visit on 12/12/22   Lipid Profile (Chol, Trig, HDL, LDL calc)     Status: Abnormal   Result Value Ref Range    Cholesterol 210 (H) <200 mg/dL    Triglycerides 176 (H) <150 mg/dL    Direct Measure HDL 48 (L) >=50 mg/dL    LDL Cholesterol Calculated 127 (H) <=100 mg/dL     Non HDL Cholesterol 162 (H) <130 mg/dL    Narrative    Cholesterol  Desirable:  <200 mg/dL    Triglycerides  Normal:  Less than 150 mg/dL  Borderline High:  150-199 mg/dL  High:  200-499 mg/dL  Very High:  Greater than or equal to 500 mg/dL    Direct Measure HDL  Female:  Greater than or equal to 50 mg/dL   Male:  Greater than or equal to 40 mg/dL    LDL Cholesterol  Desirable:  <100mg/dL  Above Desirable:  100-129 mg/dL   Borderline High:  130-159 mg/dL   High:  160-189 mg/dL   Very High:  >= 190 mg/dL    Non HDL Cholesterol  Desirable:  130 mg/dL  Above Desirable:  130-159 mg/dL  Borderline High:  160-189 mg/dL  High:  190-219 mg/dL  Very High:  Greater than or equal to 220 mg/dL   Comprehensive metabolic panel     Status: Abnormal   Result Value Ref Range    Sodium 141 136 - 145 mmol/L    Potassium 3.9 3.4 - 5.3 mmol/L    Chloride 101 98 - 107 mmol/L    Carbon Dioxide (CO2) 31 (H) 22 - 29 mmol/L    Anion Gap 9 7 - 15 mmol/L    Urea Nitrogen 13.3 8.0 - 23.0 mg/dL    Creatinine 0.73 0.51 - 0.95 mg/dL    Calcium 10.1 8.8 - 10.2 mg/dL    Glucose 97 70 - 99 mg/dL    Alkaline Phosphatase 106 (H) 35 - 104 U/L    AST 23 10 - 35 U/L    ALT 26 10 - 35 U/L    Protein Total 7.5 6.4 - 8.3 g/dL    Albumin 4.3 3.5 - 5.2 g/dL    Bilirubin Total 0.5 <=1.2 mg/dL    GFR Estimate 89 >60 mL/min/1.73m2   Extra Tube     Status: None    Narrative    The following orders were created for panel order Extra Tube.  Procedure                               Abnormality         Status                     ---------                               -----------         ------                     Extra Purple Top Tube[[id number]]                            Final result                 Please view results for these tests on the individual orders.   Extra Purple Top Tube     Status: None   Result Value Ref Range    Hold Specimen OK

## 2023-01-15 ENCOUNTER — HEALTH MAINTENANCE LETTER (OUTPATIENT)
Age: 68
End: 2023-01-15

## 2023-01-23 ENCOUNTER — TRANSFERRED RECORDS (OUTPATIENT)
Dept: HEALTH INFORMATION MANAGEMENT | Facility: CLINIC | Age: 68
End: 2023-01-23

## 2023-12-04 DIAGNOSIS — I10 BENIGN ESSENTIAL HYPERTENSION: ICD-10-CM

## 2023-12-04 RX ORDER — TRIAMTERENE AND HYDROCHLOROTHIAZIDE 37.5; 25 MG/1; MG/1
CAPSULE ORAL
Qty: 90 CAPSULE | Refills: 3 | Status: SHIPPED | OUTPATIENT
Start: 2023-12-04

## 2023-12-04 RX ORDER — METOPROLOL SUCCINATE 100 MG/1
100 TABLET, EXTENDED RELEASE ORAL DAILY
Qty: 90 TABLET | Refills: 3 | Status: SHIPPED | OUTPATIENT
Start: 2023-12-04

## 2023-12-07 ENCOUNTER — MYC MEDICAL ADVICE (OUTPATIENT)
Dept: FAMILY MEDICINE | Facility: OTHER | Age: 68
End: 2023-12-07
Payer: COMMERCIAL

## 2023-12-07 DIAGNOSIS — D47.3 ESSENTIAL THROMBOCYTOSIS (H): ICD-10-CM

## 2023-12-07 DIAGNOSIS — E55.9 HYPOVITAMINOSIS D: ICD-10-CM

## 2023-12-07 DIAGNOSIS — E78.5 HYPERLIPIDEMIA LDL GOAL <130: Primary | ICD-10-CM

## 2023-12-07 DIAGNOSIS — I10 BENIGN ESSENTIAL HYPERTENSION: ICD-10-CM

## 2023-12-11 NOTE — CONFIDENTIAL NOTE
Writer LVM for patient to return call if any questions, writer stated that orders were in for labs.

## 2023-12-12 ENCOUNTER — LAB (OUTPATIENT)
Dept: LAB | Facility: OTHER | Age: 68
End: 2023-12-12
Payer: MEDICARE

## 2023-12-12 DIAGNOSIS — I10 BENIGN ESSENTIAL HYPERTENSION: ICD-10-CM

## 2023-12-12 DIAGNOSIS — D47.3 ESSENTIAL THROMBOCYTOSIS (H): ICD-10-CM

## 2023-12-12 DIAGNOSIS — E78.5 HYPERLIPIDEMIA LDL GOAL <130: ICD-10-CM

## 2023-12-12 LAB
ALBUMIN SERPL BCG-MCNC: 4.3 G/DL (ref 3.5–5.2)
ALP SERPL-CCNC: 102 U/L (ref 40–150)
ALT SERPL W P-5'-P-CCNC: 30 U/L (ref 0–50)
ANION GAP SERPL CALCULATED.3IONS-SCNC: 10 MMOL/L (ref 7–15)
AST SERPL W P-5'-P-CCNC: 27 U/L (ref 0–45)
BILIRUB SERPL-MCNC: 0.4 MG/DL
BUN SERPL-MCNC: 22.3 MG/DL (ref 8–23)
CALCIUM SERPL-MCNC: 9.9 MG/DL (ref 8.8–10.2)
CHLORIDE SERPL-SCNC: 101 MMOL/L (ref 98–107)
CHOLEST SERPL-MCNC: 180 MG/DL
CREAT SERPL-MCNC: 0.71 MG/DL (ref 0.51–0.95)
DEPRECATED HCO3 PLAS-SCNC: 28 MMOL/L (ref 22–29)
EGFRCR SERPLBLD CKD-EPI 2021: >90 ML/MIN/1.73M2
ERYTHROCYTE [DISTWIDTH] IN BLOOD BY AUTOMATED COUNT: 13.2 % (ref 10–15)
FASTING STATUS PATIENT QL REPORTED: YES
GLUCOSE SERPL-MCNC: 92 MG/DL (ref 70–99)
HCT VFR BLD AUTO: 43.2 % (ref 35–47)
HDLC SERPL-MCNC: 40 MG/DL
HGB BLD-MCNC: 15 G/DL (ref 11.7–15.7)
LDLC SERPL CALC-MCNC: 100 MG/DL
MCH RBC QN AUTO: 29.6 PG (ref 26.5–33)
MCHC RBC AUTO-ENTMCNC: 34.7 G/DL (ref 31.5–36.5)
MCV RBC AUTO: 85 FL (ref 78–100)
NONHDLC SERPL-MCNC: 140 MG/DL
PLATELET # BLD AUTO: 345 10E3/UL (ref 150–450)
POTASSIUM SERPL-SCNC: 3.6 MMOL/L (ref 3.4–5.3)
PROT SERPL-MCNC: 7.3 G/DL (ref 6.4–8.3)
RBC # BLD AUTO: 5.06 10E6/UL (ref 3.8–5.2)
SODIUM SERPL-SCNC: 139 MMOL/L (ref 135–145)
TRIGL SERPL-MCNC: 198 MG/DL
WBC # BLD AUTO: 6.4 10E3/UL (ref 4–11)

## 2023-12-12 PROCEDURE — 82306 VITAMIN D 25 HYDROXY: CPT | Mod: ZL | Performed by: FAMILY MEDICINE

## 2023-12-12 PROCEDURE — 80053 COMPREHEN METABOLIC PANEL: CPT | Mod: ZL

## 2023-12-12 PROCEDURE — 36415 COLL VENOUS BLD VENIPUNCTURE: CPT | Mod: ZL

## 2023-12-12 PROCEDURE — 80061 LIPID PANEL: CPT | Mod: ZL

## 2023-12-12 PROCEDURE — 85027 COMPLETE CBC AUTOMATED: CPT | Mod: ZL

## 2023-12-12 PROCEDURE — 82607 VITAMIN B-12: CPT | Mod: ZL

## 2023-12-13 LAB
VIT B12 SERPL-MCNC: 709 PG/ML (ref 232–1245)
VIT D+METAB SERPL-MCNC: 56 NG/ML (ref 20–50)

## 2023-12-18 ENCOUNTER — APPOINTMENT (OUTPATIENT)
Dept: CT IMAGING | Facility: HOSPITAL | Age: 68
End: 2023-12-18
Attending: NURSE PRACTITIONER
Payer: MEDICARE

## 2023-12-18 ENCOUNTER — NURSE TRIAGE (OUTPATIENT)
Dept: FAMILY MEDICINE | Facility: OTHER | Age: 68
End: 2023-12-18

## 2023-12-18 ENCOUNTER — HOSPITAL ENCOUNTER (EMERGENCY)
Facility: HOSPITAL | Age: 68
Discharge: HOME | End: 2023-12-18
Attending: NURSE PRACTITIONER | Admitting: NURSE PRACTITIONER
Payer: MEDICARE

## 2023-12-18 VITALS
OXYGEN SATURATION: 98 % | TEMPERATURE: 97.3 F | DIASTOLIC BLOOD PRESSURE: 78 MMHG | SYSTOLIC BLOOD PRESSURE: 161 MMHG | HEART RATE: 54 BPM | RESPIRATION RATE: 18 BRPM

## 2023-12-18 DIAGNOSIS — R10.30 LOWER ABDOMINAL PAIN: Primary | ICD-10-CM

## 2023-12-18 DIAGNOSIS — R39.9 LOWER URINARY TRACT SYMPTOMS (LUTS): ICD-10-CM

## 2023-12-18 DIAGNOSIS — M54.9 BACK PAIN: ICD-10-CM

## 2023-12-18 LAB
ALBUMIN SERPL BCG-MCNC: 4.2 G/DL (ref 3.5–5.2)
ALBUMIN UR-MCNC: NEGATIVE MG/DL
ALP SERPL-CCNC: 109 U/L (ref 40–150)
ALT SERPL W P-5'-P-CCNC: 30 U/L (ref 0–50)
ANION GAP SERPL CALCULATED.3IONS-SCNC: 10 MMOL/L (ref 7–15)
APPEARANCE UR: CLEAR
AST SERPL W P-5'-P-CCNC: 27 U/L (ref 0–45)
BASOPHILS # BLD AUTO: 0 10E3/UL (ref 0–0.2)
BASOPHILS NFR BLD AUTO: 1 %
BILIRUB SERPL-MCNC: 0.5 MG/DL
BILIRUB UR QL STRIP: NEGATIVE
BUN SERPL-MCNC: 12.7 MG/DL (ref 8–23)
CALCIUM SERPL-MCNC: 9.7 MG/DL (ref 8.8–10.2)
CHLORIDE SERPL-SCNC: 100 MMOL/L (ref 98–107)
COLOR UR AUTO: NORMAL
CREAT SERPL-MCNC: 0.7 MG/DL (ref 0.51–0.95)
DEPRECATED HCO3 PLAS-SCNC: 27 MMOL/L (ref 22–29)
EGFRCR SERPLBLD CKD-EPI 2021: >90 ML/MIN/1.73M2
EOSINOPHIL # BLD AUTO: 0.1 10E3/UL (ref 0–0.7)
EOSINOPHIL NFR BLD AUTO: 1 %
ERYTHROCYTE [DISTWIDTH] IN BLOOD BY AUTOMATED COUNT: 13.2 % (ref 10–15)
GLUCOSE SERPL-MCNC: 95 MG/DL (ref 70–99)
GLUCOSE UR STRIP-MCNC: NEGATIVE MG/DL
HCT VFR BLD AUTO: 42.2 % (ref 35–47)
HGB BLD-MCNC: 14.6 G/DL (ref 11.7–15.7)
HGB UR QL STRIP: NEGATIVE
HOLD SPECIMEN: NORMAL
IMM GRANULOCYTES # BLD: 0 10E3/UL
IMM GRANULOCYTES NFR BLD: 0 %
KETONES UR STRIP-MCNC: NEGATIVE MG/DL
LEUKOCYTE ESTERASE UR QL STRIP: NEGATIVE
LYMPHOCYTES # BLD AUTO: 1.5 10E3/UL (ref 0.8–5.3)
LYMPHOCYTES NFR BLD AUTO: 25 %
MCH RBC QN AUTO: 29.4 PG (ref 26.5–33)
MCHC RBC AUTO-ENTMCNC: 34.6 G/DL (ref 31.5–36.5)
MCV RBC AUTO: 85 FL (ref 78–100)
MONOCYTES # BLD AUTO: 0.5 10E3/UL (ref 0–1.3)
MONOCYTES NFR BLD AUTO: 8 %
NEUTROPHILS # BLD AUTO: 4 10E3/UL (ref 1.6–8.3)
NEUTROPHILS NFR BLD AUTO: 65 %
NITRATE UR QL: NEGATIVE
NRBC # BLD AUTO: 0 10E3/UL
NRBC BLD AUTO-RTO: 0 /100
PH UR STRIP: 7.5 [PH] (ref 4.7–8)
PLATELET # BLD AUTO: 353 10E3/UL (ref 150–450)
POTASSIUM SERPL-SCNC: 3.4 MMOL/L (ref 3.4–5.3)
PROT SERPL-MCNC: 7.4 G/DL (ref 6.4–8.3)
RBC # BLD AUTO: 4.96 10E6/UL (ref 3.8–5.2)
RBC URINE: <1 /HPF
SODIUM SERPL-SCNC: 137 MMOL/L (ref 135–145)
SP GR UR STRIP: 1.01 (ref 1–1.03)
SQUAMOUS EPITHELIAL: 0 /HPF
UROBILINOGEN UR STRIP-MCNC: NORMAL MG/DL
WBC # BLD AUTO: 6 10E3/UL (ref 4–11)
WBC URINE: 0 /HPF

## 2023-12-18 PROCEDURE — G0463 HOSPITAL OUTPT CLINIC VISIT: HCPCS | Mod: 25

## 2023-12-18 PROCEDURE — 250N000011 HC RX IP 250 OP 636: Performed by: RADIOLOGY

## 2023-12-18 PROCEDURE — 99213 OFFICE O/P EST LOW 20 MIN: CPT | Performed by: NURSE PRACTITIONER

## 2023-12-18 PROCEDURE — 81001 URINALYSIS AUTO W/SCOPE: CPT | Performed by: NURSE PRACTITIONER

## 2023-12-18 PROCEDURE — G0463 HOSPITAL OUTPT CLINIC VISIT: HCPCS

## 2023-12-18 PROCEDURE — 85025 COMPLETE CBC W/AUTO DIFF WBC: CPT | Performed by: NURSE PRACTITIONER

## 2023-12-18 PROCEDURE — 74177 CT ABD & PELVIS W/CONTRAST: CPT | Mod: MG

## 2023-12-18 PROCEDURE — G1010 CDSM STANSON: HCPCS

## 2023-12-18 PROCEDURE — 80053 COMPREHEN METABOLIC PANEL: CPT | Performed by: NURSE PRACTITIONER

## 2023-12-18 RX ORDER — IOPAMIDOL 755 MG/ML
75 INJECTION, SOLUTION INTRAVASCULAR ONCE
Status: COMPLETED | OUTPATIENT
Start: 2023-12-18 | End: 2023-12-18

## 2023-12-18 RX ADMIN — IOPAMIDOL 75 ML: 755 INJECTION, SOLUTION INTRAVENOUS at 12:05

## 2023-12-18 ASSESSMENT — ENCOUNTER SYMPTOMS
COUGH: 0
NAUSEA: 0
SHORTNESS OF BREATH: 0
APPETITE CHANGE: 0
CHILLS: 0
DIARRHEA: 0
VOMITING: 0
FLANK PAIN: 1
ABDOMINAL PAIN: 1
FEVER: 0
FREQUENCY: 1

## 2023-12-18 ASSESSMENT — ACTIVITIES OF DAILY LIVING (ADL): ADLS_ACUITY_SCORE: 35

## 2023-12-18 NOTE — ED TRIAGE NOTES
Pt presents with c/o having urinary frequency and urgency   Pt reports increased lower abdominal and flank pain   Pt denies any blood in urine   Pt reports increased chills at night but denies any fevers   S/x started 3 days ago   No otc meds taken today

## 2023-12-18 NOTE — DISCHARGE INSTRUCTIONS
Your blood work all looks good.  The CT scan of your stomach does not show anything that can explain your symptoms.  I recommend taking Tylenol or ibuprofen as needed for pain.  Strength plenty of fluids.    Schedule an appointment with your primary doctor for reevaluation.    Return to urgent care or emergency department for any worsening or concerning symptoms.

## 2023-12-18 NOTE — TELEPHONE ENCOUNTER
"Disposition: go to ED/ UCC    Patient called with symptoms of lower abdominal pain that radiates to groin and down left leg. Patient reports lower left back pain. Patient reports burning with urination and chills. Patient denies fever.   Per protocol patient advised to be seen in UC/ED. Patient verbalized understanding.    Reason for Disposition   MILD TO MODERATE constant pain lasting > 2 hours    Additional Information   Negative: Passed out (i.e., fainted, collapsed and was not responding)   Negative: Shock suspected (e.g., cold/pale/clammy skin, too weak to stand, low BP, rapid pulse)   Negative: Sounds like a life-threatening emergency to the triager   Negative: Followed an abdomen (stomach) injury   Negative: Chest pain   Negative: Abdominal pain and pregnant < 20 weeks   Negative: Abdominal pain and pregnant 20 or more weeks   Negative: Pain is mainly in upper abdomen (if needed ask: 'is it mainly above the belly button?')   Negative: Abdomen bloating or swelling are main symptoms   Negative: SEVERE abdominal pain (e.g., excruciating)   Negative: Vomiting red blood or black (coffee ground) material   Negative: Blood in bowel movements  (Exception: Blood on surface of BM with constipation.)   Negative: Black or tarry bowel movements  (Exception: Chronic-unchanged black-grey BMs AND is taking iron pills or Pepto-Bismol.)    Answer Assessment - Initial Assessment Questions  1. LOCATION: \"Where does it hurt?\"       Lower abdominal pain  2. RADIATION: \"Does the pain shoot anywhere else?\" (e.g., chest, back)      Across whole abdomen and radiates down the left leg  3. ONSET: \"When did the pain begin?\" (e.g., minutes, hours or days ago)       Three days ago  4. SUDDEN: \"Gradual or sudden onset?\"      constant  5. PATTERN \"Does the pain come and go, or is it constant?\"     - If it comes and goes: \"How long does it last?\" \"Do you have pain now?\"      (Note: Comes and goes means the pain is intermittent. It goes away " "completely between bouts.)     - If constant: \"Is it getting better, staying the same, or getting worse?\"       (Note: Constant means the pain never goes away completely; most serious pain is constant and gets worse.)       constnat  6. SEVERITY: \"How bad is the pain?\"  (e.g., Scale 1-10; mild, moderate, or severe)     - MILD (1-3): Doesn't interfere with normal activities, abdomen soft and not tender to touch.      - MODERATE (4-7): Interferes with normal activities or awakens from sleep, abdomen tender to touch.      - SEVERE (8-10): Excruciating pain, doubled over, unable to do any normal activities.        Moderate to severe  7. RECURRENT SYMPTOM: \"Have you ever had this type of stomach pain before?\" If Yes, ask: \"When was the last time?\" and \"What happened that time?\"       no  8. CAUSE: \"What do you think is causing the stomach pain?\"      Unsure/ possible bladder/ kidney infection  9. RELIEVING/AGGRAVATING FACTORS: \"What makes it better or worse?\" (e.g., antacids, bending or twisting motion, bowel movement)      Walking improves it- possibly      Sleeping makes it worse  10. OTHER SYMPTOMS: \"Do you have any other symptoms?\" (e.g., back pain, diarrhea, fever, urination pain, vomiting)        Lower left back pain , burning with urination, chills   11. PREGNANCY: \"Is there any chance you are pregnant?\" \"When was your last menstrual period?\"        no    Protocols used: Abdominal Pain - Female-A-[place]    "

## 2023-12-18 NOTE — ED PROVIDER NOTES
History     Chief Complaint   Patient presents with    Dysuria     HPI  Su Land is a 68 year old female who presents ambulatory to urgent care with concerns of a urinary tract infection.  Patient reports lower abdominal pain that started about 3 days ago.  This is accompanied by right-sided flank/back pain that occasionally radiates into her left upper thigh with movement.  She also reports slightly painful urination, increased urinary frequency and urgency.  No hematuria.  No nausea, vomiting or diarrhea.  No fevers or chills.  No abnormal vaginal discharge.    Allergies:  Allergies   Allergen Reactions    Codeine Itching       Problem List:    Patient Active Problem List    Diagnosis Date Noted    Grief 12/12/2022     Priority: Medium    Inflamed seborrheic keratosis 09/07/2021     Priority: Medium    Bilateral carpal tunnel syndrome 09/07/2021     Priority: Medium    Candidiasis of skin 09/07/2021     Priority: Medium    Hyperlipidemia LDL goal <130 11/09/2020     Priority: Medium    Post-menopausal 11/09/2020     Priority: Medium    Family history of thyroid disease 11/09/2020     Priority: Medium    Somatic dysfunction of cervical region 10/15/2018     Priority: Medium    Neck pain 10/15/2018     Priority: Medium    Benign essential hypertension 04/03/2018     Priority: Medium    Essential thrombocytosis (H) 03/15/2017     Priority: Medium    BPPV (benign paroxysmal positional vertigo) 01/07/2016     Priority: Medium    Swelling of right knee joint 01/06/2015     Priority: Medium        Past Medical History:    Past Medical History:   Diagnosis Date    Arthritis     Bilateral lower extremity edema     Bronchospasm     Cervicalgia     Essential thrombocytosis (H)     Herpes zoster without complication 2016    Hyperplastic colonic polyp, unspecified part of colon 2017    Hypertension        Past Surgical History:    Past Surgical History:   Procedure Laterality Date    APPENDECTOMY      COLONOSCOPY N/A  2017    due  WHOLE COLON COLONOSCOPY WITH POLYPECTOMY;  Surgeon: Isabelle Faulkner MD;  Location: [facility] OR    GYN SURGERY       x2    ORTHOPEDIC SURGERY  2013    left total knee replacement    ORTHOPEDIC SURGERY  10/28/2014    right total knee replacement       Family History:    Family History   Problem Relation Age of Onset    Hypertension Mother     Thyroid Disease Mother         hypothyroidism    Heart Disease Mother         myocardial infarction    C.A.D. Mother 70    Alzheimer Disease Mother     Other - See Comments Father 52        cerebral aneurysm; cause of death    Hypertension Sister     C.A.D. Sister     Thyroid Disease Sister         hypothyroid    Heart Disease Sister 59        MI     Coronary Artery Disease Sister     Hypertension Brother     Other - See Comments Brother         Stroke    Coronary Artery Disease Brother     Cerebral palsy Brother     Dementia Brother     Hypertension Brother     Peripheral Vascular Disease Brother         is a runner    Ulcerative Colitis Brother 66        getting IV infusions    Thyroid Disease Brother     Unknown/Adopted Maternal Grandmother          very young, cancer?    Suicide Maternal Grandfather     Heart Failure Paternal Grandmother         pneumonia    Heart Disease Paternal Grandfather 60       Social History:  Marital Status:   [2]  Social History     Tobacco Use    Smoking status: Never    Smokeless tobacco: Never   Vaping Use    Vaping Use: Never used   Substance Use Topics    Alcohol use: Yes     Comment: occasional    Drug use: No        Medications:    albuterol (VENTOLIN HFA) 108 (90 Base) MCG/ACT inhaler  ASPIRIN PO  cetirizine (ZYRTEC) 10 MG tablet  metoprolol succinate ER (TOPROL XL) 100 MG 24 hr tablet  triamterene-HCTZ (DYAZIDE) 37.5-25 MG capsule          Review of Systems   Constitutional:  Negative for appetite change, chills and fever.   Respiratory:  Negative for cough and shortness of breath.     Gastrointestinal:  Positive for abdominal pain. Negative for diarrhea, nausea and vomiting.   Genitourinary:  Positive for flank pain, frequency and urgency.   All other systems reviewed and are negative.      Physical Exam   BP: 161/78  Pulse: 54  Temp: 97.3  F (36.3  C)  Resp: 18  SpO2: 98 %      Physical Exam  Vitals and nursing note reviewed.   Constitutional:       General: She is not in acute distress.     Appearance: Normal appearance. She is well-developed. She is not ill-appearing or toxic-appearing.   HENT:      Head: Normocephalic and atraumatic.      Mouth/Throat:      Mouth: Mucous membranes are moist.   Eyes:      Conjunctiva/sclera: Conjunctivae normal.   Cardiovascular:      Rate and Rhythm: Normal rate and regular rhythm.      Heart sounds: Normal heart sounds.   Pulmonary:      Effort: Pulmonary effort is normal. No respiratory distress.      Breath sounds: Normal breath sounds.   Abdominal:      General: Abdomen is flat.      Palpations: Abdomen is soft.      Tenderness: There is abdominal tenderness in the right lower quadrant, suprapubic area and left lower quadrant. There is no right CVA tenderness, left CVA tenderness, guarding or rebound.   Musculoskeletal:      Cervical back: Normal range of motion and neck supple.   Skin:     General: Skin is warm and dry.      Coloration: Skin is not pale.   Neurological:      Mental Status: She is alert and oriented to person, place, and time.         ED Course                 Procedures              Results for orders placed or performed during the hospital encounter of 12/18/23 (from the past 24 hour(s))   UA with Microscopic reflex to Culture    Specimen: Urine, Clean Catch   Result Value Ref Range    Color Urine Straw Colorless, Straw, Light Yellow, Yellow    Appearance Urine Clear Clear    Glucose Urine Negative Negative mg/dL    Bilirubin Urine Negative Negative    Ketones Urine Negative Negative mg/dL    Specific Gravity Urine 1.007 1.003 - 1.035     Blood Urine Negative Negative    pH Urine 7.5 4.7 - 8.0    Protein Albumin Urine Negative Negative mg/dL    Urobilinogen Urine Normal Normal, 2.0 mg/dL    Nitrite Urine Negative Negative    Leukocyte Esterase Urine Negative Negative    RBC Urine <1 <=2 /HPF    WBC Urine 0 <=5 /HPF    Squamous Epithelials Urine 0 <=1 /HPF    Narrative    Urine Culture not indicated   CBC with platelets differential    Narrative    The following orders were created for panel order CBC with platelets differential.  Procedure                               Abnormality         Status                     ---------                               -----------         ------                     CBC with platelets and d...[[id number]]                      Final result                 Please view results for these tests on the individual orders.   Comprehensive metabolic panel   Result Value Ref Range    Sodium 137 135 - 145 mmol/L    Potassium 3.4 3.4 - 5.3 mmol/L    Carbon Dioxide (CO2) 27 22 - 29 mmol/L    Anion Gap 10 7 - 15 mmol/L    Urea Nitrogen 12.7 8.0 - 23.0 mg/dL    Creatinine 0.70 0.51 - 0.95 mg/dL    GFR Estimate >90 >60 mL/min/1.73m2    Calcium 9.7 8.8 - 10.2 mg/dL    Chloride 100 98 - 107 mmol/L    Glucose 95 70 - 99 mg/dL    Alkaline Phosphatase 109 40 - 150 U/L    AST 27 0 - 45 U/L    ALT 30 0 - 50 U/L    Protein Total 7.4 6.4 - 8.3 g/dL    Albumin 4.2 3.5 - 5.2 g/dL    Bilirubin Total 0.5 <=1.2 mg/dL   CBC with platelets and differential   Result Value Ref Range    WBC Count 6.0 4.0 - 11.0 10e3/uL    RBC Count 4.96 3.80 - 5.20 10e6/uL    Hemoglobin 14.6 11.7 - 15.7 g/dL    Hematocrit 42.2 35.0 - 47.0 %    MCV 85 78 - 100 fL    MCH 29.4 26.5 - 33.0 pg    MCHC 34.6 31.5 - 36.5 g/dL    RDW 13.2 10.0 - 15.0 %    Platelet Count 353 150 - 450 10e3/uL    % Neutrophils 65 %    % Lymphocytes 25 %    % Monocytes 8 %    % Eosinophils 1 %    % Basophils 1 %    % Immature Granulocytes 0 %    NRBCs per 100 WBC 0 <1 /100    Absolute  Neutrophils 4.0 1.6 - 8.3 10e3/uL    Absolute Lymphocytes 1.5 0.8 - 5.3 10e3/uL    Absolute Monocytes 0.5 0.0 - 1.3 10e3/uL    Absolute Eosinophils 0.1 0.0 - 0.7 10e3/uL    Absolute Basophils 0.0 0.0 - 0.2 10e3/uL    Absolute Immature Granulocytes 0.0 <=0.4 10e3/uL    Absolute NRBCs 0.0 10e3/uL   Extra Tube    Narrative    The following orders were created for panel order Extra Tube.  Procedure                               Abnormality         Status                     ---------                               -----------         ------                     Extra Blue Top Tube[[id number]]                              Final result               Extra Red Top Tube[[id number]]                               Final result               Extra Heparinized Syringe[[id number]]                        Final result                 Please view results for these tests on the individual orders.   Extra Blue Top Tube   Result Value Ref Range    Hold Specimen JIC    Extra Red Top Tube   Result Value Ref Range    Hold Specimen JIC    Extra Heparinized Syringe   Result Value Ref Range    Hold Specimen OK    CT Abdomen Pelvis w Contrast    Narrative    PROCEDURE:  CT ABDOMEN PELVIS W CONTRAST    HISTORY: lower abdominal pain and low back pain x3 days; UTI symtoms  with negative urinalysis; hx of appendectomy    TECHNIQUE: Helical CT of the abdomen and pelvis was performed  following injection of intravenous contrast. This CT exam was  performed using one or more the following dose reduction techniques:  automated exposure control, adjustment of the mA and/or kV according  to patient size, and/or iterative reconstruction technique.    COMPARISON: None.    MEDS/CONTRAST: Isovue 370 75ml    FINDINGS:      Limited images through the lung bases demonstrate no focal  consolidation or mass.     The liver demonstrates a few cysts without intrahepatic biliary ductal  dilatation. The gallbladder is unremarkable. The spleen, pancreas and  adrenal  glands are unremarkable. Symmetric nephrograms are present  without hydronephrosis. There is no abdominal aortic aneurysm.    The bowel is normal in caliber. .    No free fluid, free air or adenopathy is seen.     Degenerative changes are seen in the lumbar spine and SI joints. No  suspicious osseous lesions are identified.      Impression    IMPRESSION:      No finding to account for abdominal pain.    CAROLYN BRIDGES MD         SYSTEM ID:  [id number]       Medications   iopamidol (ISOVUE-370) solution 75 mL (75 mLs Intravenous $Given 12/18/23 1205)   sodium chloride (PF) 0.9% PF flush 50 mL (50 mLs Intravenous $Given 12/18/23 1205)       Assessments & Plan (with Medical Decision Making)  68-year-old female that presented to with primary concern of UTI symptoms as she developed bilateral lower abdominal pain as well as right-sided flank/back pain.  Your urinalysis today was negative for any concerning findings.  He lab findings are reassuring without leukocytosis.  No electrolyte abnormalities.  Normal kidney and liver function.  CT abdomen and pelvis was negative for any intra-abdominal findings consistent with the abdominal pain.  I discussed these findings with patient at length.  The back pain is more suspicious for muscular versus kidney related pain.  At this time I recommended Tylenol or ibuprofen as needed for pain.  Push fluids.  I advised her to schedule an appointment with a primary doctor for reevaluation.  She will return to urgent care or emergency department for any worsening or concerning symptoms.     I have reviewed the nursing notes.    I have reviewed the findings, diagnosis, plan and need for follow up with the patient.  This document was prepared using a combination of typing and voice generated software.  While every attempt was made for accuracy, spelling and grammatical errors may exist.         New Prescriptions    No medications on file       Final diagnoses:   Lower abdominal pain    Back pain   Lower urinary tract symptoms (LUTS)       12/18/2023   [facility]       Mpofu, Prudence, CNP  12/18/23 1161

## 2024-01-04 ENCOUNTER — OFFICE VISIT (OUTPATIENT)
Dept: FAMILY MEDICINE | Facility: OTHER | Age: 69
End: 2024-01-04
Attending: FAMILY MEDICINE
Payer: COMMERCIAL

## 2024-01-04 VITALS
SYSTOLIC BLOOD PRESSURE: 150 MMHG | WEIGHT: 168.7 LBS | OXYGEN SATURATION: 98 % | HEIGHT: 60 IN | BODY MASS INDEX: 33.12 KG/M2 | HEART RATE: 51 BPM | DIASTOLIC BLOOD PRESSURE: 70 MMHG | TEMPERATURE: 97.7 F

## 2024-01-04 DIAGNOSIS — D47.3 ESSENTIAL THROMBOCYTOSIS (H): ICD-10-CM

## 2024-01-04 DIAGNOSIS — I10 BENIGN ESSENTIAL HYPERTENSION: ICD-10-CM

## 2024-01-04 DIAGNOSIS — Z78.0 POST-MENOPAUSAL: ICD-10-CM

## 2024-01-04 DIAGNOSIS — E78.5 HYPERLIPIDEMIA LDL GOAL <130: ICD-10-CM

## 2024-01-04 DIAGNOSIS — Z00.00 ENCOUNTER FOR MEDICARE ANNUAL WELLNESS EXAM: Primary | ICD-10-CM

## 2024-01-04 PROCEDURE — 99214 OFFICE O/P EST MOD 30 MIN: CPT | Mod: 25 | Performed by: FAMILY MEDICINE

## 2024-01-04 PROCEDURE — G0439 PPPS, SUBSEQ VISIT: HCPCS | Performed by: FAMILY MEDICINE

## 2024-01-04 PROCEDURE — G0463 HOSPITAL OUTPT CLINIC VISIT: HCPCS

## 2024-01-04 SDOH — HEALTH STABILITY: PHYSICAL HEALTH: ON AVERAGE, HOW MANY MINUTES DO YOU ENGAGE IN EXERCISE AT THIS LEVEL?: 60 MIN

## 2024-01-04 SDOH — HEALTH STABILITY: PHYSICAL HEALTH: ON AVERAGE, HOW MANY DAYS PER WEEK DO YOU ENGAGE IN MODERATE TO STRENUOUS EXERCISE (LIKE A BRISK WALK)?: 5 DAYS

## 2024-01-04 ASSESSMENT — ASTHMA QUESTIONNAIRES
QUESTION_5 LAST FOUR WEEKS HOW WOULD YOU RATE YOUR ASTHMA CONTROL: COMPLETELY CONTROLLED
QUESTION_2 LAST FOUR WEEKS HOW OFTEN HAVE YOU HAD SHORTNESS OF BREATH: NOT AT ALL
QUESTION_4 LAST FOUR WEEKS HOW OFTEN HAVE YOU USED YOUR RESCUE INHALER OR NEBULIZER MEDICATION (SUCH AS ALBUTEROL): NOT AT ALL
QUESTION_3 LAST FOUR WEEKS HOW OFTEN DID YOUR ASTHMA SYMPTOMS (WHEEZING, COUGHING, SHORTNESS OF BREATH, CHEST TIGHTNESS OR PAIN) WAKE YOU UP AT NIGHT OR EARLIER THAN USUAL IN THE MORNING: NOT AT ALL
ACT_TOTALSCORE: 25
QUESTION_1 LAST FOUR WEEKS HOW MUCH OF THE TIME DID YOUR ASTHMA KEEP YOU FROM GETTING AS MUCH DONE AT WORK, SCHOOL OR AT HOME: NONE OF THE TIME
ACT_TOTALSCORE: 25

## 2024-01-04 ASSESSMENT — ENCOUNTER SYMPTOMS
DYSURIA: 0
CHILLS: 0
NAUSEA: 0
SORE THROAT: 0
PARESTHESIAS: 0
DIZZINESS: 0
FEVER: 0
BREAST MASS: 0
DIARRHEA: 0
ARTHRALGIAS: 0
HEADACHES: 0
HEARTBURN: 0
CONSTIPATION: 0
EYE PAIN: 0
COUGH: 0
ABDOMINAL PAIN: 0
PALPITATIONS: 0
HEMATOCHEZIA: 0
WEAKNESS: 0
SHORTNESS OF BREATH: 0
NERVOUS/ANXIOUS: 0
MYALGIAS: 0
FREQUENCY: 0
JOINT SWELLING: 0
HEMATURIA: 0

## 2024-01-04 ASSESSMENT — ANXIETY QUESTIONNAIRES
6. BECOMING EASILY ANNOYED OR IRRITABLE: NOT AT ALL
GAD7 TOTAL SCORE: 0
7. FEELING AFRAID AS IF SOMETHING AWFUL MIGHT HAPPEN: NOT AT ALL
7. FEELING AFRAID AS IF SOMETHING AWFUL MIGHT HAPPEN: NOT AT ALL
2. NOT BEING ABLE TO STOP OR CONTROL WORRYING: NOT AT ALL
1. FEELING NERVOUS, ANXIOUS, OR ON EDGE: NOT AT ALL
5. BEING SO RESTLESS THAT IT IS HARD TO SIT STILL: NOT AT ALL
3. WORRYING TOO MUCH ABOUT DIFFERENT THINGS: NOT AT ALL
GAD7 TOTAL SCORE: 0
4. TROUBLE RELAXING: NOT AT ALL
GAD7 TOTAL SCORE: 0

## 2024-01-04 ASSESSMENT — LIFESTYLE VARIABLES
AUDIT-C TOTAL SCORE: 2
SKIP TO QUESTIONS 9-10: 1
HOW OFTEN DO YOU HAVE SIX OR MORE DRINKS ON ONE OCCASION: NEVER
HOW OFTEN DO YOU HAVE A DRINK CONTAINING ALCOHOL: 2-4 TIMES A MONTH
HOW MANY STANDARD DRINKS CONTAINING ALCOHOL DO YOU HAVE ON A TYPICAL DAY: 1 OR 2

## 2024-01-04 ASSESSMENT — PAIN SCALES - GENERAL: PAINLEVEL: NO PAIN (0)

## 2024-01-04 ASSESSMENT — PATIENT HEALTH QUESTIONNAIRE - PHQ9
SUM OF ALL RESPONSES TO PHQ QUESTIONS 1-9: 0
10. IF YOU CHECKED OFF ANY PROBLEMS, HOW DIFFICULT HAVE THESE PROBLEMS MADE IT FOR YOU TO DO YOUR WORK, TAKE CARE OF THINGS AT HOME, OR GET ALONG WITH OTHER PEOPLE: NOT DIFFICULT AT ALL
SUM OF ALL RESPONSES TO PHQ QUESTIONS 1-9: 0

## 2024-01-04 ASSESSMENT — ACTIVITIES OF DAILY LIVING (ADL): CURRENT_FUNCTION: NO ASSISTANCE NEEDED

## 2024-01-04 NOTE — PROGRESS NOTES
"SUBJECTIVE:   Su is a 68 year old, presenting for the following:  Medicare Visit and Physical        1/4/2024     9:44 AM   Additional Questions   Roomed by Liliana ELKINS LPN   Accompanied by Self       Are you in the first 12 months of your Medicare coverage?  No    Healthy Habits:     In general, how would you rate your overall health?  Good    Frequency of exercise:  4-5 days/week    Duration of exercise:  45-60 minutes    Do you usually eat at least 4 servings of fruit and vegetables a day, include whole grains    & fiber and avoid regularly eating high fat or \"junk\" foods?  No    Taking medications regularly:  Yes    Ability to successfully perform activities of daily living:  No assistance needed    Home Safety:  No safety concerns identified    Hearing Impairment:  Need to ask people to speak up or repeat themselves and difficulty understanding soft or whispered speech    In the past 6 months, have you been bothered by leaking of urine?  No    In general, how would you rate your overall mental or emotional health?  Good    Additional concerns today:  No      Today's PHQ-9 Score:       1/4/2024     9:37 AM   PHQ-9 SCORE   PHQ-9 Total Score MyChart 0   PHQ-9 Total Score 0     Patient would like to discuss her heart rate today.     Have you ever done Advance Care Planning? (For example, a Health Directive, POLST, or a discussion with a medical provider or your loved ones about your wishes): No, advance care planning information given to patient to review.  Patient declined advance care planning discussion at this time.    Fall risk  Fallen 2 or more times in the past year?: No  Any fall with injury in the past year?: No    Cognitive Screening   1) Repeat 3 items (Leader, Season, Table)    2) Clock draw: NORMAL  3) 3 item recall: Recalls 3 objects  Results: 3 items recalled: COGNITIVE IMPAIRMENT LESS LIKELY    Mini-CogTM Copyright S Chela. Licensed by the author for use in [facility]; reprinted " with permission ([email or web address]). All rights reserved.      Do you have sleep apnea, excessive snoring or daytime drowsiness? : no    Reviewed and updated as needed this visit by clinical staff                  Reviewed and updated as needed this visit by Provider                 Social History     Tobacco Use    Smoking status: Never    Smokeless tobacco: Never   Substance Use Topics    Alcohol use: Yes     Comment: occasional             1/4/2024     9:43 AM   Alcohol Use   Prescreen: >3 drinks/day or >7 drinks/week? No   Do you have a current opioid prescription? No  Do you use any other controlled substances or medications that are not prescribed by a provider? None    Hypertension Follow-up    Do you check your blood pressure regularly outside of the clinic? No   Are you following a low salt diet? No  Are your blood pressures ever more than 140 on the top number (systolic) OR more   than 90 on the bottom number (diastolic), for example 140/90? No    Depression Followup  How are you doing with your depression since your last visit? Improved   Are you having other symptoms that might be associated with depression? No  Have you had a significant life event?  OTHER: Daughter with cancer    Are you feeling anxious or having panic attacks?   No  Do you have any concerns with your use of alcohol or other drugs? No    Social History     Tobacco Use    Smoking status: Never    Smokeless tobacco: Never   Vaping Use    Vaping Use: Never used   Substance Use Topics    Alcohol use: Yes     Comment: occasional    Drug use: No         5/29/2019     2:00 PM 11/9/2020    11:00 AM 1/4/2024     9:37 AM   PHQ   PHQ-9 Total Score 0 0 0   Q9: Thoughts of better off dead/self-harm past 2 weeks Not at all Not at all Not at all         5/29/2019     2:00 PM 11/9/2020    11:00 AM 1/4/2024     9:38 AM   YOLIE-7 SCORE   Total Score   0 (minimal anxiety)   Total Score 0 0 0         1/4/2024     9:37 AM   Last PHQ-9   1.  Little interest or  pleasure in doing things 0   2.  Feeling down, depressed, or hopeless 0   3.  Trouble falling or staying asleep, or sleeping too much 0   4.  Feeling tired or having little energy 0   5.  Poor appetite or overeating 0   6.  Feeling bad about yourself 0   7.  Trouble concentrating 0   8.  Moving slowly or restless 0   Q9: Thoughts of better off dead/self-harm past 2 weeks 0   PHQ-9 Total Score 0       Suicide Assessment Five-step Evaluation and Treatment (SAFE-T)    Current providers sharing in care for this patient include: Patient Care Team:  Katalina Neville MD as PCP - General (Family Medicine)  Katalina Neville MD as Assigned PCP    The following health maintenance items are reviewed in Epic and correct as of today:  Health Maintenance   Topic Date Due    ZOSTER IMMUNIZATION (1 of 2) Never done    RSV VACCINE (Pregnancy & 60+) (1 - 1-dose 60+ series) Never done    ASTHMA ACTION PLAN  11/15/2022    ASTHMA CONTROL TEST  07/04/2024    DTAP/TDAP/TD IMMUNIZATION (2 - Td or Tdap) 10/06/2024    MEDICARE ANNUAL WELLNESS VISIT  12/07/2024    LIPID  12/12/2024    FALL RISK ASSESSMENT  01/04/2025    MAMMO SCREENING  01/23/2025    ADVANCE CARE PLANNING  09/07/2026    Pneumococcal Vaccine: 65+ Years (3 of 3 - PPSV23 or PCV20) 09/07/2026    COLORECTAL CANCER SCREENING  04/17/2027    DEXA  12/21/2035    HEPATITIS C SCREENING  Completed    PHQ-2 (once per calendar year)  Completed    INFLUENZA VACCINE  Completed    COVID-19 Vaccine  Completed    IPV IMMUNIZATION  Aged Out    HPV IMMUNIZATION  Aged Out    MENINGITIS IMMUNIZATION  Aged Out    RSV MONOCLONAL ANTIBODY  Aged Out     Lab work is in process  Labs reviewed in EPIC  BP Readings from Last 3 Encounters:   01/04/24 (!) 150/70   12/18/23 161/78   12/12/22 122/70    Wt Readings from Last 3 Encounters:   01/04/24 76.5 kg (168 lb 11.2 oz)   12/12/22 69.4 kg (153 lb)   11/15/21 75.8 kg (167 lb)                  Patient Active Problem List   Diagnosis    Swelling of right  knee joint    BPPV (benign paroxysmal positional vertigo)    Essential thrombocytosis (H)    Benign essential hypertension    Somatic dysfunction of cervical region    Neck pain    Hyperlipidemia LDL goal <130    Post-menopausal    Family history of thyroid disease    Inflamed seborrheic keratosis    Bilateral carpal tunnel syndrome    Candidiasis of skin    Grief     Past Surgical History:   Procedure Laterality Date    APPENDECTOMY      COLONOSCOPY N/A 2017    due  WHOLE COLON COLONOSCOPY WITH POLYPECTOMY;  Surgeon: Isabelle Faulkner MD;  Location: [facility]    GYN SURGERY       x2    ORTHOPEDIC SURGERY  2013    left total knee replacement    ORTHOPEDIC SURGERY  10/28/2014    right total knee replacement       Social History     Tobacco Use    Smoking status: Never    Smokeless tobacco: Never   Substance Use Topics    Alcohol use: Yes     Comment: 1-2x/mo     Family History   Problem Relation Age of Onset    Hypertension Mother     Thyroid Disease Mother         hypothyroidism    Heart Disease Mother         myocardial infarction    C.A.D. Mother 70    Alzheimer Disease Mother     Other - See Comments Father 52        cerebral aneurysm; cause of death    Hypertension Sister     C.A.D. Sister     Heart Disease Sister 59        MI     Coronary Artery Disease Sister     Hypothyroidism Sister     Hypertension Brother     Coronary Artery Disease Brother     Cerebral palsy Brother     Dementia Brother 74        in assisted living    Cerebrovascular Disease Brother     Myocardial Infarction Brother 60    Hypertension Brother     Peripheral Vascular Disease Brother         is a runner    Ulcerative Colitis Brother 66        getting IV infusions    Thyroid Disease Brother     Unknown/Adopted Maternal Grandmother          very young, cancer?    Suicide Maternal Grandfather     Heart Failure Paternal Grandmother         pneumonia    Heart Disease Paternal Grandfather 60         Current Outpatient  Medications   Medication Sig Dispense Refill    albuterol (VENTOLIN HFA) 108 (90 Base) MCG/ACT inhaler INHALE 2 PUFFS INTO THE LUNGS EVERY 6 HOURS AS NEEDED FOR SHORTNESS OF BREATH OR DIFFICULT BREATHING OR WHEEZING 18 g 0    ASPIRIN PO Take 81 mg by mouth daily      cetirizine (ZYRTEC) 10 MG tablet TAKE 1 TABLET(10 MG) BY MOUTH DAILY 90 tablet 3    metoprolol succinate ER (TOPROL XL) 100 MG 24 hr tablet Take 1 tablet (100 mg) by mouth daily 90 tablet 3    triamterene-HCTZ (DYAZIDE) 37.5-25 MG capsule TAKE ONE capsule BY MOUTH EVERY DAY Strength: 37.5-25 mg 90 capsule 3     Allergies   Allergen Reactions    Codeine Itching     Recent Labs   Lab Test 12/18/23  1118 12/12/23  0940 12/12/22  1021 11/15/21  1237 09/07/21  1105 11/09/20  1200 05/29/19  0816 04/27/18  1625   LDL  --  100 127*  --  120* 135* 84  --    HDL  --  40* 48*  --  46* 48* 47*  --    TRIG  --  198* 176*  --  119 167* 115  --    ALT 30 30 26   < > 39 37 47  --    CR 0.70 0.71 0.73   < > 0.78 0.65 0.71  --    GFRESTIMATED >90 >90 89   < > 79 >90 89  --    GFRESTBLACK  --   --   --   --   --  >90 >90  --    POTASSIUM 3.4 3.6 3.9   < > 3.5 3.8 3.4  --    TSH  --   --   --   --   --  1.03  --  1.35    < > = values in this interval not displayed.      Mammogram Screening: Mammogram Screening: Recommended mammography every 1-2 years with patient discussion and risk factor consideration    FHS-7:        No data to display              Pertinent mammograms are reviewed under the imaging tab.    Review of Systems   Constitutional:  Negative for chills and fever.   HENT:  Negative for congestion, ear pain, hearing loss and sore throat.    Eyes:  Negative for pain and visual disturbance.   Respiratory:  Negative for cough and shortness of breath.    Cardiovascular:  Negative for chest pain, palpitations and peripheral edema.   Gastrointestinal:  Negative for abdominal pain, constipation, diarrhea, heartburn, hematochezia and nausea.   Breasts:  Negative for  tenderness, breast mass and discharge.   Genitourinary:  Negative for dysuria, frequency, genital sores, hematuria, pelvic pain, urgency, vaginal bleeding and vaginal discharge.   Musculoskeletal:  Negative for arthralgias, joint swelling and myalgias.   Skin:  Negative for rash.   Neurological:  Negative for dizziness, weakness, headaches and paresthesias.   Psychiatric/Behavioral:  Negative for mood changes. The patient is not nervous/anxious.      OBJECTIVE:   BP (!) 150/70 (BP Location: Right arm, Patient Position: Sitting, Cuff Size: Adult Large)   Pulse 51   Temp 97.7  F (36.5  C) (Tympanic)   Ht 1.524 m (5')   Wt 76.5 kg (168 lb 11.2 oz)   SpO2 98%   BMI 32.95 kg/m   Estimated body mass index is 32.95 kg/m  as calculated from the following:    Height as of this encounter: 1.524 m (5').    Weight as of this encounter: 76.5 kg (168 lb 11.2 oz).  Physical Exam  GENERAL APPEARANCE: healthy, alert and no distress  EYES: Eyes grossly normal to inspection, PERRL and conjunctivae and sclerae normal  HENT: ear canals and TM's normal, nose and mouth without ulcers or lesions, oropharynx clear and oral mucous membranes moist  NECK: no adenopathy, no asymmetry, masses, or scars and thyroid normal to palpation  RESP: lungs clear to auscultation - no rales, rhonchi or wheezes  CV: regular rate and rhythm, normal S1 S2, no S3 or S4, no murmur, click or rub, no peripheral edema and peripheral pulses strong  ABDOMEN: soft, nontender, no hepatosplenomegaly, no masses and bowel sounds normal  MS: no musculoskeletal defects are noted and gait is age appropriate without ataxia  SKIN: no suspicious lesions or rashes  NEURO: Normal strength and tone, sensory exam grossly normal, mentation intact and speech normal  PSYCH: mentation appears normal and affect normal/bright    Diagnostic Test Results:  Labs reviewed in Epic  No results found for any visits on 01/04/24.    ASSESSMENT / PLAN:   (Z00.00) Encounter for Medicare  annual wellness exam  (primary encounter diagnosis)  Comment:   Plan: follow-up 1 year    (I10) Benign essential hypertension  Comment:   Plan: she will check at home     (E78.5) Hyperlipidemia LDL goal <130  Comment:   Plan: Lipid Profile (Chol, Trig, HDL, LDL calc)        The 10-year ASCVD risk score (Bairon MIRELES, et al., 2019) is: 15.9%    Values used to calculate the score:      Age: 68 years      Sex: Female      Is Non- : No      Diabetic: No      Tobacco smoker: No      Systolic Blood Pressure: 150 mmHg      Is BP treated: Yes      HDL Cholesterol: 40 mg/dL      Total Cholesterol: 180 mg/dL  She is going to work on it and recheck in 3 mos    (D47.3) Essential thrombocytosis (H)  Comment:   Plan: resolved, suspect it was involved in her situation prior to knee replacements    (Z78.0) Post-menopausal  Comment:   Plan: DX Hip/Pelvis/Spine        due    COUNSELING:  Reviewed preventive health counseling, as reflected in patient instructions  Special attention given to:       Regular exercise       Healthy diet/nutrition       Vision screening       Hearing screening       Dental care       Osteoporosis prevention/bone health        She reports that she has never smoked. She has never used smokeless tobacco.      Appropriate preventive services were discussed with this patient, including applicable screening as appropriate for fall prevention, nutrition, physical activity, Tobacco-use cessation, weight loss and cognition.  Checklist reviewing preventive services available has been given to the patient.    Reviewed patients plan of care and provided an AVS. The Intermediate Care Plan ( asthma action plan, low back pain action plan, and migraine action plan) for Su meets the Care Plan requirement. This Care Plan has been established and reviewed with the Patient.          Katalina Neville MD  [facility]    Identified Health Risks:  I have reviewed Opioid Use  Disorder and Substance Use Disorder risk factors and made any needed referrals.

## 2024-01-04 NOTE — PROGRESS NOTES
"The patient was counseled and encouraged to consider modifying their diet and eating habits. She was provided with information on recommended healthy diet options.  The patient was provided with written information regarding signs of hearing loss.  The 10-year ASCVD risk score (Bairon MIRELES, et al., 2019) is: 15.9%    Values used to calculate the score:      Age: 68 years      Sex: Female      Is Non- : No      Diabetic: No      Tobacco smoker: No      Systolic Blood Pressure: 150 mmHg      Is BP treated: Yes      HDL Cholesterol: 40 mg/dL      Total Cholesterol: 180 mg/dL    Answers submitted by the patient for this visit:  Patient Health Questionnaire (Submitted on 1/4/2024)  If you checked off any problems, how difficult have these problems made it for you to do your work, take care of things at home, or get along with other people?: Not difficult at all  PHQ9 TOTAL SCORE: 0  YOLIE-7 (Submitted on 1/4/2024)  YOLIE 7 TOTAL SCORE: 0  Annual Preventive Visit (Submitted on 1/4/2024)  Chief Complaint: Annual Exam:  In general, how would you rate your overall physical health?: good  Frequency of exercise:: 4-5 days/week  Do you usually eat at least 4 servings of fruit and vegetables a day, include whole grains & fiber, and avoid regularly eating high fat or \"junk\" foods? : No  Taking medications regularly:: Yes  Activities of Daily Living: no assistance needed  Home safety: no safety concerns identified  Hearing Impairment:: need to ask people to speak up or repeat themselves, difficulty understanding soft or whispered speech  In the past 6 months, have you been bothered by leaking of urine?: No  abdominal pain: No  Blood in stool: No  Blood in urine: No  chest pain: No  chills: No  congestion: No  constipation: No  cough: No  diarrhea: No  dizziness: No  ear pain: No  eye pain: No  nervous/anxious: No  fever: No  frequency: No  genital sores: No  headaches: No  hearing loss: No  heartburn: " No  arthralgias: No  joint swelling: No  peripheral edema: No  mood changes: No  myalgias: No  nausea: No  dysuria: No  palpitations: No  Skin sensation changes: No  sore throat: No  urgency: No  rash: No  shortness of breath: No  visual disturbance: No  weakness: No  pelvic pain: No  vaginal bleeding: No  vaginal discharge: No  tenderness: No  breast mass: No  breast discharge: No  In general, how would you rate your overall mental or emotional health?: good  Additional concerns today:: No  Exercise outside of work (Submitted on 1/4/2024)  Chief Complaint: Annual Exam:  Duration of exercise:: 45-60 minutes

## 2024-01-04 NOTE — PATIENT INSTRUCTIONS
The Glucose Revolution  by Bebe Romero  (glucose goddess)  The Obesity Code by Dr Eb Portillo  (web -- you tube)  Fast Like a Girl  by Dr Vanessa Vanegas  (youtube)      Patient Education   Personalized Prevention Plan  You are due for the preventive services outlined below.  Your care team is available to assist you in scheduling these services.  If you have already completed any of these items, please share that information with your care team to update in your medical record.  Health Maintenance Due   Topic Date Due    Zoster (Shingles) Vaccine (1 of 2) Never done    RSV VACCINE (Pregnancy & 60+) (1 - 1-dose 60+ series) Never done    Asthma Action Plan - yearly  11/15/2022     Learning About Dietary Guidelines  What are the Dietary Guidelines for Americans?     Dietary Guidelines for Americans provide tips for eating well and staying healthy. This helps reduce the risk for long-term (chronic) diseases.  These guidelines recommend that you:  Eat and drink the right amount for you. The U.[place] government's food guide is called MyPlate. It can help you make your own well-balanced eating plan.  Try to balance your eating with your activity. This helps you stay at a healthy weight.  Drink alcohol in moderation, if at all.  Limit foods high in salt, saturated fat, trans fat, and added sugar.  These guidelines are from the U.S. Department of Agriculture and the U.S. Department of Health and Human Services. They are updated every 5 years.  What is MyPlate?  MyPlate is the U.[place]. government's food guide. It can help you make your own well-balanced eating plan. A balanced eating plan means that you eat enough, but not too much, and that your food gives you the nutrients you need to stay healthy.  MyPlate focuses on eating plenty of whole grains, fruits, and vegetables, and on limiting fat and sugar. It is available online at www.ChooseMyPlate.gov.  How can you get started?  If you're trying to eat healthier, you can slowly change  "your eating habits over time. You don't have to make big changes all at once. Start by adding one or two healthy foods to your meals each day.  Grains  Choose whole-grain breads and cereals and whole-wheat pasta and whole-grain crackers.  Vegetables  Eat a variety of vegetables every day. They have lots of nutrients and are part of a heart-healthy diet.  Fruits  Eat a variety of fruits every day. Fruits contain lots of nutrients. Choose fresh fruit instead of fruit juice.  Protein foods  Choose fish and lean poultry more often. Eat red meat and fried meats less often. Dried beans, tofu, and nuts are also good sources of protein.  Dairy  Choose low-fat or fat-free products from this food group. If you have problems digesting milk, try eating cheese or yogurt instead.  Fats and oils  Limit fats and oils if you're trying to cut calories. Choose healthy fats when you cook. These include canola oil and olive oil.  Where can you learn more?  Go to https://www.[employer].net/patiented  Enter D676 in the search box to learn more about \"Learning About Dietary Guidelines.\"  Current as of: February 28, 2023               Content Version: 13.8    3772-4094 [employer].   Care instructions adapted under license by your healthcare professional. If you have questions about a medical condition or this instruction, always ask your healthcare professional. [employer] disclaims any warranty or liability for your use of this information.      Hearing Loss: Care Instructions  Overview     Hearing loss is a sudden or slow decrease in how well you hear. It can range from slight to profound. Permanent hearing loss can occur with aging. It also can happen when you are exposed long-term to loud noise. Examples include listening to loud music, riding motorcycles, or being around other loud machines.  Hearing loss can affect your work and home life. It can make you feel lonely or depressed. You may feel that you have " lost your independence. But hearing aids and other devices can help you hear better and feel connected to others.  Follow-up care is a key part of your treatment and safety. Be sure to make and go to all appointments, and call your doctor if you are having problems. It's also a good idea to know your test results and keep a list of the medicines you take.  How can you care for yourself at home?  Avoid loud noises whenever possible. This helps keep your hearing from getting worse.  Always wear hearing protection around loud noises.  Wear a hearing aid as directed.  A professional can help you pick a hearing aid that will work best for you.  You can also get hearing aids over the counter for mild to moderate hearing loss.  Have hearing tests as your doctor suggests. They can show whether your hearing has changed. Your hearing aid may need to be adjusted.  Use other devices as needed. These may include:  Telephone amplifiers and hearing aids that can connect to a television, stereo, radio, or microphone.  Devices that use lights or vibrations. These alert you to the doorbell, a ringing telephone, or a baby monitor.  Television closed-captioning. This shows the words at the bottom of the screen. Most new TVs can do this.  TTY (text telephone). This lets you type messages back and forth on the telephone instead of talking or listening. These devices are also called TDD. When messages are typed on the keyboard, they are sent over the phone line to a receiving TTY. The message is shown on a monitor.  Use text messaging, social media, and email if it is hard for you to communicate by telephone.  Try to learn a listening technique called speechreading. It is not lipreading. You pay attention to people's gestures, expressions, posture, and tone of voice. These clues can help you understand what a person is saying. Face the person you are talking to, and have them face you. Make sure the lighting is good. You need to see the  "other person's face clearly.  Think about counseling if you need help to adjust to your hearing loss.  When should you call for help?  Watch closely for changes in your health, and be sure to contact your doctor if:    You think your hearing is getting worse.     You have new symptoms, such as dizziness or nausea.   Where can you learn more?  Go to https://www.[employer].net/patiented  Enter R798 in the search box to learn more about \"Hearing Loss: Care Instructions.\"  Current as of: February 28, 2023               Content Version: 13.8    3477-0944 [employer].   Care instructions adapted under license by your healthcare professional. If you have questions about a medical condition or this instruction, always ask your healthcare professional. [employer] disclaims any warranty or liability for your use of this information.         "

## 2024-01-31 ENCOUNTER — HOSPITAL ENCOUNTER (OUTPATIENT)
Dept: BONE DENSITY | Facility: HOSPITAL | Age: 69
Discharge: HOME | End: 2024-01-31
Attending: FAMILY MEDICINE | Admitting: FAMILY MEDICINE
Payer: MEDICARE

## 2024-01-31 DIAGNOSIS — Z78.0 POST-MENOPAUSAL: ICD-10-CM

## 2024-01-31 PROCEDURE — 77080 DXA BONE DENSITY AXIAL: CPT

## 2024-04-08 ENCOUNTER — LAB (OUTPATIENT)
Dept: LAB | Facility: OTHER | Age: 69
End: 2024-04-08
Payer: MEDICARE

## 2024-04-08 DIAGNOSIS — E78.5 HYPERLIPIDEMIA LDL GOAL <130: ICD-10-CM

## 2024-04-08 LAB
CHOLEST SERPL-MCNC: 204 MG/DL
FASTING STATUS PATIENT QL REPORTED: YES
HDLC SERPL-MCNC: 42 MG/DL
LDLC SERPL CALC-MCNC: 131 MG/DL
NONHDLC SERPL-MCNC: 162 MG/DL
TRIGL SERPL-MCNC: 156 MG/DL

## 2024-04-08 PROCEDURE — 82465 ASSAY BLD/SERUM CHOLESTEROL: CPT | Mod: ZL

## 2024-04-08 PROCEDURE — 36415 COLL VENOUS BLD VENIPUNCTURE: CPT | Mod: ZL

## 2024-04-11 ENCOUNTER — TELEPHONE (OUTPATIENT)
Dept: FAMILY MEDICINE | Facility: OTHER | Age: 69
End: 2024-04-11

## 2024-04-11 DIAGNOSIS — E78.5 HYPERLIPIDEMIA LDL GOAL <130: Primary | ICD-10-CM

## 2024-04-11 NOTE — TELEPHONE ENCOUNTER
9:37 AM    Reason for Call: Phone Call    Description: Patient says she missed a call from the nurse. Going on a statin medication. Please call patient back she would like to speak to the nurse.    Was an appointment offered for this call? No  If yes : Appointment type              Date    Preferred method for responding to this message: Telephone Call  What is your phone number ?  [phone]    If we cannot reach you directly, may we leave a detailed response at the number you provided? Yes    Can this message wait until your PCP/provider returns, if available today? YES, Provider is in    Cate Anthony

## 2024-04-12 NOTE — TELEPHONE ENCOUNTER
Katalina Neville MD Dobson, Alexis M, LPN  Actually if that's the case, keep doing what she is doing as weight loss will put cholesterol out into the blood stream initially, will recheck next year or in 3-4 mos  If in 3-4 mos, please place lab orders and send back to me.  Thx      Writer called and updated patient with information. Patient verbalized understanding and had no questions. Patient would like to recheck levels in 3-4 months. Orders have been pended. Patient is scheduled for 7/26/2024 at 900.

## 2024-05-02 ENCOUNTER — TRANSFERRED RECORDS (OUTPATIENT)
Dept: HEALTH INFORMATION MANAGEMENT | Facility: CLINIC | Age: 69
End: 2024-05-02

## 2024-07-26 ENCOUNTER — LAB (OUTPATIENT)
Dept: LAB | Facility: OTHER | Age: 69
End: 2024-07-26
Payer: MEDICARE

## 2024-07-26 DIAGNOSIS — E78.5 HYPERLIPIDEMIA LDL GOAL <130: ICD-10-CM

## 2024-07-26 LAB
CHOLEST SERPL-MCNC: 175 MG/DL
FASTING STATUS PATIENT QL REPORTED: YES
HDLC SERPL-MCNC: 44 MG/DL
LDLC SERPL CALC-MCNC: 104 MG/DL
NONHDLC SERPL-MCNC: 131 MG/DL
TRIGL SERPL-MCNC: 134 MG/DL

## 2024-07-26 PROCEDURE — 80061 LIPID PANEL: CPT | Mod: ZL

## 2024-07-26 PROCEDURE — 36415 COLL VENOUS BLD VENIPUNCTURE: CPT | Mod: ZL

## 2024-08-13 ENCOUNTER — ALLIED HEALTH/NURSE VISIT (OUTPATIENT)
Dept: FAMILY MEDICINE | Facility: OTHER | Age: 69
End: 2024-08-13
Attending: FAMILY MEDICINE
Payer: COMMERCIAL

## 2024-08-13 VITALS — SYSTOLIC BLOOD PRESSURE: 122 MMHG | DIASTOLIC BLOOD PRESSURE: 68 MMHG

## 2024-08-13 DIAGNOSIS — I10 BENIGN ESSENTIAL HYPERTENSION: Primary | ICD-10-CM

## 2024-11-26 DIAGNOSIS — I10 BENIGN ESSENTIAL HYPERTENSION: ICD-10-CM

## 2024-11-26 RX ORDER — TRIAMTERENE AND HYDROCHLOROTHIAZIDE 37.5; 25 MG/1; MG/1
CAPSULE ORAL
Qty: 90 CAPSULE | Refills: 3 | Status: SHIPPED | OUTPATIENT
Start: 2024-11-26

## 2024-12-30 ENCOUNTER — HOSPITAL ENCOUNTER (EMERGENCY)
Facility: HOSPITAL | Age: 69
Discharge: HOME | End: 2024-12-30
Payer: MEDICARE

## 2024-12-30 ENCOUNTER — APPOINTMENT (OUTPATIENT)
Dept: GENERAL RADIOLOGY | Facility: HOSPITAL | Age: 69
End: 2024-12-30
Payer: MEDICARE

## 2024-12-30 VITALS
HEART RATE: 58 BPM | DIASTOLIC BLOOD PRESSURE: 80 MMHG | BODY MASS INDEX: 31.05 KG/M2 | SYSTOLIC BLOOD PRESSURE: 133 MMHG | WEIGHT: 159 LBS | OXYGEN SATURATION: 98 % | TEMPERATURE: 97.7 F | RESPIRATION RATE: 18 BRPM

## 2024-12-30 DIAGNOSIS — J32.9 SINUSITIS: ICD-10-CM

## 2024-12-30 DIAGNOSIS — H66.91 ACUTE RIGHT OTITIS MEDIA: ICD-10-CM

## 2024-12-30 DIAGNOSIS — J32.9 SINUSITIS, UNSPECIFIED CHRONICITY, UNSPECIFIED LOCATION: Primary | ICD-10-CM

## 2024-12-30 PROCEDURE — G0463 HOSPITAL OUTPT CLINIC VISIT: HCPCS | Mod: 25

## 2024-12-30 PROCEDURE — 71046 X-RAY EXAM CHEST 2 VIEWS: CPT

## 2024-12-30 PROCEDURE — 99213 OFFICE O/P EST LOW 20 MIN: CPT

## 2024-12-30 RX ORDER — BENZONATATE 100 MG/1
100 CAPSULE ORAL 3 TIMES DAILY PRN
Qty: 21 CAPSULE | Refills: 0 | Status: SHIPPED | OUTPATIENT
Start: 2024-12-30

## 2024-12-30 ASSESSMENT — ENCOUNTER SYMPTOMS
SHORTNESS OF BREATH: 0
NAUSEA: 0
ACTIVITY CHANGE: 0
SINUS PRESSURE: 1
APPETITE CHANGE: 0
DIARRHEA: 0
FEVER: 0
COUGH: 1
VOMITING: 0
SORE THROAT: 0
SINUS PAIN: 1

## 2024-12-30 ASSESSMENT — ACTIVITIES OF DAILY LIVING (ADL): ADLS_ACUITY_SCORE: 41

## 2024-12-30 NOTE — DISCHARGE INSTRUCTIONS
Augmentin 2 times daily for 10 days. Yogurt or probiotic while taking.   Tessalon every 8 hours as needed for cough.   Push fluids.   Tylenol and ibuprofen as directed.   Follow up in the clinic for a recheck.   Return with any new or concerning symptoms.

## 2024-12-30 NOTE — ED PROVIDER NOTES
History     Chief Complaint   Patient presents with    Cough     HPI  Su Land is a 69 year old female who presents to the urgent care with a 2 week history of a dry, non productive cough. She has also had congestion and sinus pressure for the last week. She denies chest pain, shortness of breath, fevers, and n/v/d. No hx of asthma or COPD. Non smoker.     Allergies:  Allergies   Allergen Reactions    Codeine Itching       Problem List:    Patient Active Problem List    Diagnosis Date Noted    Grief 2022     Priority: Medium    Inflamed seborrheic keratosis 2021     Priority: Medium    Bilateral carpal tunnel syndrome 2021     Priority: Medium    Candidiasis of skin 2021     Priority: Medium    Hyperlipidemia LDL goal <130 2020     Priority: Medium    Post-menopausal 2020     Priority: Medium    Family history of thyroid disease 2020     Priority: Medium    Somatic dysfunction of cervical region 10/15/2018     Priority: Medium    Neck pain 10/15/2018     Priority: Medium    Benign essential hypertension 2018     Priority: Medium    Essential thrombocytosis (H) 03/15/2017     Priority: Medium    BPPV (benign paroxysmal positional vertigo) 2016     Priority: Medium    Swelling of right knee joint 2015     Priority: Medium        Past Medical History:    Past Medical History:   Diagnosis Date    Arthritis     Bilateral lower extremity edema     Bronchospasm     Cervicalgia     Essential thrombocytosis (H)     Herpes zoster without complication 2016    Hyperplastic colonic polyp, unspecified part of colon 2017    Hypertension        Past Surgical History:    Past Surgical History:   Procedure Laterality Date    APPENDECTOMY      COLONOSCOPY N/A 2017    due  WHOLE COLON COLONOSCOPY WITH POLYPECTOMY;  Surgeon: Isabelle Faulkner MD;  Location: [facility]    GYN SURGERY       x2    ORTHOPEDIC SURGERY  2013    left total knee  replacement    ORTHOPEDIC SURGERY  10/28/2014    right total knee replacement       Family History:    Family History   Problem Relation Age of Onset    Hypertension Mother     Thyroid Disease Mother         hypothyroidism    Heart Disease Mother         myocardial infarction    C.A.D. Mother 70    Alzheimer Disease Mother     Other - See Comments Father 52        cerebral aneurysm; cause of death    Hypertension Sister     C.A.D. Sister     Heart Disease Sister 59        MI     Coronary Artery Disease Sister     Hypothyroidism Sister     Hypertension Brother     Coronary Artery Disease Brother     Cerebral palsy Brother     Dementia Brother 74        in assisted living    Cerebrovascular Disease Brother     Myocardial Infarction Brother 60    Hypertension Brother     Peripheral Vascular Disease Brother         is a runner    Ulcerative Colitis Brother 66        getting IV infusions    Thyroid Disease Brother     Unknown/Adopted Maternal Grandmother          very young, cancer?    Suicide Maternal Grandfather     Heart Failure Paternal Grandmother         pneumonia    Heart Disease Paternal Grandfather 60       Social History:  Marital Status:   [2]  Social History     Tobacco Use    Smoking status: Never    Smokeless tobacco: Never   Vaping Use    Vaping status: Never Used   Substance Use Topics    Alcohol use: Yes     Comment: 1-2x/mo    Drug use: No        Medications:    albuterol (VENTOLIN HFA) 108 (90 Base) MCG/ACT inhaler  amoxicillin-clavulanate (AUGMENTIN) 875-125 MG tablet  ASPIRIN PO  benzonatate (TESSALON) 100 MG capsule  cetirizine (ZYRTEC) 10 MG tablet  metoprolol succinate ER (TOPROL XL) 100 MG 24 hr tablet  triamterene-HCTZ (DYAZIDE) 37.5-25 MG capsule          Review of Systems   Constitutional:  Negative for activity change, appetite change and fever.   HENT:  Positive for congestion, ear pain, sinus pressure and sinus pain. Negative for sore throat.    Respiratory:  Positive for cough.  Negative for shortness of breath.    Cardiovascular:  Negative for chest pain.   Gastrointestinal:  Negative for diarrhea, nausea and vomiting.   All other systems reviewed and are negative.      Physical Exam   BP: 133/80  Pulse: 58  Temp: 97.7  F (36.5  C)  Resp: 18  Weight: 72.1 kg (159 lb)  SpO2: 98 %      Physical Exam  Vitals and nursing note reviewed.   Constitutional:       General: She is not in acute distress.     Appearance: Normal appearance. She is not ill-appearing or toxic-appearing.   HENT:      Head:      Jaw: No trismus.      Right Ear: Tympanic membrane is erythematous.      Left Ear: Tympanic membrane is not erythematous.      Nose: Congestion present.      Right Sinus: Maxillary sinus tenderness and frontal sinus tenderness present.      Left Sinus: Maxillary sinus tenderness and frontal sinus tenderness present.      Mouth/Throat:      Mouth: Mucous membranes are moist.      Pharynx: Oropharynx is clear. No oropharyngeal exudate or posterior oropharyngeal erythema.   Cardiovascular:      Rate and Rhythm: Normal rate and regular rhythm.      Heart sounds: Normal heart sounds. No murmur heard.  Pulmonary:      Effort: Pulmonary effort is normal.      Breath sounds: Normal breath sounds. No wheezing, rhonchi or rales.   Neurological:      Mental Status: She is alert.         ED Course        Procedures       Results for orders placed or performed during the hospital encounter of 12/30/24 (from the past 24 hours)   Chest XR,  PA & LAT    Narrative    Procedure:XR CHEST 2 VIEWS    Clinical history:Female, 69 years, cough    Technique: Two views are submitted.    Comparison: No relevant prior imaging.    Findings: The cardiac silhouette is within normal limits.    The lungs appear to be clear. Retrocardiac density is appreciated that  appears to represent a hiatal hernia. Bony structures are  unremarkable.      Impression    Impression:   No acute abnormality. No evidence of acute or active  cardiopulmonary  disease.    NANDA MEMBRENO MD         SYSTEM ID:  [id number]       Medications - No data to display    Assessments & Plan (with Medical Decision Making)     I have reviewed the nursing notes.    I have reviewed the findings, diagnosis, plan and need for follow up with the patient.  Su Land is a 69 year old female who presents to the urgent care with a 2 week history of a dry, non productive cough. She has also had congestion and sinus pressure for the last week. She denies chest pain, shortness of breath, fevers, and n/v/d. No hx of asthma or COPD. Non smoker.     MDM: vital signs normal, afebrile. Non toxic in appearance with no noted distress. Able to speak in complete sentences without dyspnea. Lungs clear, heart tones regular. CXR negative for pneumonia. Bilateral frontal and maxillary sinus pressure. Mild erythema to right TM, bulging. Augmentin and tessalon prescribed. Given length of symptoms, most likely bacterial sinusitis. Supportive measures and return precautions discussed. She is in agreement with plan.     (H66.91) Acute right otitis media,   (J32.9) Sinusitis  Plan: Augmentin 2 times daily for 10 days. Yogurt or probiotic while taking.   Tessalon every 8 hours as needed for cough.   Push fluids.   Tylenol and ibuprofen as directed.   Follow up in the clinic for a recheck.   Return with any new or concerning symptoms. Understanding verbalized.     New Prescriptions    AMOXICILLIN-CLAVULANATE (AUGMENTIN) 875-125 MG TABLET    Take 1 tablet by mouth 2 times daily for 10 days.    BENZONATATE (TESSALON) 100 MG CAPSULE    Take 1 capsule (100 mg) by mouth 3 times daily as needed for cough.       Final diagnoses:   Acute right otitis media   Sinusitis       12/30/2024   [facility]       Genny Kate NP  12/30/24 [id number]

## 2024-12-30 NOTE — ED TRIAGE NOTES
Pt presents with cough that started two weeks ago and states the past week sinus congestion has begun. Pt denies any other symptoms. Pt has been taking several otc medications with minimal relief.

## 2025-01-05 ENCOUNTER — MYC MEDICAL ADVICE (OUTPATIENT)
Dept: FAMILY MEDICINE | Facility: OTHER | Age: 70
End: 2025-01-05

## 2025-01-05 DIAGNOSIS — E78.5 HYPERLIPIDEMIA LDL GOAL <130: ICD-10-CM

## 2025-01-05 DIAGNOSIS — I10 BENIGN ESSENTIAL HYPERTENSION: Primary | ICD-10-CM

## 2025-01-06 ENCOUNTER — HOSPITAL ENCOUNTER (EMERGENCY)
Facility: HOSPITAL | Age: 70
Discharge: HOME | End: 2025-01-06
Attending: NURSE PRACTITIONER | Admitting: NURSE PRACTITIONER
Payer: MEDICARE

## 2025-01-06 VITALS
SYSTOLIC BLOOD PRESSURE: 137 MMHG | TEMPERATURE: 97 F | RESPIRATION RATE: 19 BRPM | HEART RATE: 60 BPM | DIASTOLIC BLOOD PRESSURE: 79 MMHG | OXYGEN SATURATION: 96 %

## 2025-01-06 DIAGNOSIS — U07.1 COVID-19 VIRUS INFECTION: Primary | ICD-10-CM

## 2025-01-06 LAB
FLUAV RNA SPEC QL NAA+PROBE: NEGATIVE
FLUBV RNA RESP QL NAA+PROBE: NEGATIVE
RSV RNA SPEC NAA+PROBE: NEGATIVE
SARS-COV-2 RNA RESP QL NAA+PROBE: POSITIVE

## 2025-01-06 PROCEDURE — 87637 SARSCOV2&INF A&B&RSV AMP PRB: CPT | Performed by: NURSE PRACTITIONER

## 2025-01-06 PROCEDURE — 99213 OFFICE O/P EST LOW 20 MIN: CPT | Performed by: NURSE PRACTITIONER

## 2025-01-06 PROCEDURE — G0463 HOSPITAL OUTPT CLINIC VISIT: HCPCS

## 2025-01-06 ASSESSMENT — ACTIVITIES OF DAILY LIVING (ADL)
ADLS_ACUITY_SCORE: 41
ADLS_ACUITY_SCORE: 41

## 2025-01-06 ASSESSMENT — ENCOUNTER SYMPTOMS
SINUS PAIN: 1
TROUBLE SWALLOWING: 0
VOMITING: 0
CHILLS: 0
SORE THROAT: 0
DIARRHEA: 0
COUGH: 1
SHORTNESS OF BREATH: 0
NECK PAIN: 0
EYE DISCHARGE: 0
ABDOMINAL PAIN: 0
SINUS PRESSURE: 1
NAUSEA: 0
HEADACHES: 0
PSYCHIATRIC NEGATIVE: 1
MYALGIAS: 0
FEVER: 0
NECK STIFFNESS: 0
EYE REDNESS: 0
RHINORRHEA: 1

## 2025-01-06 ASSESSMENT — COLUMBIA-SUICIDE SEVERITY RATING SCALE - C-SSRS
2. HAVE YOU ACTUALLY HAD ANY THOUGHTS OF KILLING YOURSELF IN THE PAST MONTH?: NO
6. HAVE YOU EVER DONE ANYTHING, STARTED TO DO ANYTHING, OR PREPARED TO DO ANYTHING TO END YOUR LIFE?: NO
1. IN THE PAST MONTH, HAVE YOU WISHED YOU WERE DEAD OR WISHED YOU COULD GO TO SLEEP AND NOT WAKE UP?: NO

## 2025-01-06 NOTE — ED PROVIDER NOTES
History     Chief Complaint   Patient presents with    Nasal Congestion     HPI  Su Land is a 69 year old female who presents to urgent care today ambulatory with complaints of congestion, ear pain, rhinorrhea, sinus pain and pressure and cough.  Patient states she has been sick for a month, was seen on 12/30/2024 and diagnosed with sinusitis and given Augmentin, states is not working as patient continues to have sinus pain and pressure.  In the past month symptoms improved and then worsened again a while back.  Denies any fever, chills, nausea, vomiting, diarrhea, shortness of breath or chest pain.  Has been taking cetirizine, no other OTC meds.  Patient is going to the [facility] tomorrow with her daughter and wants to make sure she is not contagious.  No other concerns.    Allergies:  Allergies   Allergen Reactions    Codeine Itching       Problem List:    Patient Active Problem List    Diagnosis Date Noted    Grief 12/12/2022     Priority: Medium    Inflamed seborrheic keratosis 09/07/2021     Priority: Medium    Bilateral carpal tunnel syndrome 09/07/2021     Priority: Medium    Candidiasis of skin 09/07/2021     Priority: Medium    Hyperlipidemia LDL goal <130 11/09/2020     Priority: Medium    Post-menopausal 11/09/2020     Priority: Medium    Family history of thyroid disease 11/09/2020     Priority: Medium    Somatic dysfunction of cervical region 10/15/2018     Priority: Medium    Neck pain 10/15/2018     Priority: Medium    Benign essential hypertension 04/03/2018     Priority: Medium    Essential thrombocytosis (H) 03/15/2017     Priority: Medium    BPPV (benign paroxysmal positional vertigo) 01/07/2016     Priority: Medium    Swelling of right knee joint 01/06/2015     Priority: Medium        Past Medical History:    Past Medical History:   Diagnosis Date    Arthritis     Bilateral lower extremity edema     Bronchospasm     Cervicalgia     Essential thrombocytosis (H)     Herpes zoster  without complication 2016    Hyperplastic colonic polyp, unspecified part of colon 2017    Hypertension        Past Surgical History:    Past Surgical History:   Procedure Laterality Date    APPENDECTOMY      COLONOSCOPY N/A 2017    due  WHOLE COLON COLONOSCOPY WITH POLYPECTOMY;  Surgeon: Isabelle Faulkner MD;  Location: [facility]    GYN SURGERY       x2    ORTHOPEDIC SURGERY  2013    left total knee replacement    ORTHOPEDIC SURGERY  10/28/2014    right total knee replacement       Family History:    Family History   Problem Relation Age of Onset    Hypertension Mother     Thyroid Disease Mother         hypothyroidism    Heart Disease Mother         myocardial infarction    C.A.D. Mother 70    Alzheimer Disease Mother     Other - See Comments Father 52        cerebral aneurysm; cause of death    Hypertension Sister     C.A.D. Sister     Heart Disease Sister 59        MI     Coronary Artery Disease Sister     Hypothyroidism Sister     Hypertension Brother     Coronary Artery Disease Brother     Cerebral palsy Brother     Dementia Brother 74        in assisted living    Cerebrovascular Disease Brother     Myocardial Infarction Brother 60    Hypertension Brother     Peripheral Vascular Disease Brother         is a runner    Ulcerative Colitis Brother 66        getting IV infusions    Thyroid Disease Brother     Unknown/Adopted Maternal Grandmother          very young, cancer?    Suicide Maternal Grandfather     Heart Failure Paternal Grandmother         pneumonia    Heart Disease Paternal Grandfather 60       Social History:  Marital Status:   [2]  Social History     Tobacco Use    Smoking status: Never    Smokeless tobacco: Never   Vaping Use    Vaping status: Never Used   Substance Use Topics    Alcohol use: Yes     Comment: 1-2x/mo    Drug use: No        Medications:    albuterol (VENTOLIN HFA) 108 (90 Base) MCG/ACT inhaler  amoxicillin-clavulanate (AUGMENTIN) 875-125 MG  tablet  ASPIRIN PO  benzonatate (TESSALON) 100 MG capsule  cetirizine (ZYRTEC) 10 MG tablet  metoprolol succinate ER (TOPROL XL) 100 MG 24 hr tablet  triamterene-HCTZ (DYAZIDE) 37.5-25 MG capsule      Review of Systems   Constitutional:  Negative for chills and fever.   HENT:  Positive for congestion, ear pain, rhinorrhea, sinus pressure and sinus pain. Negative for sore throat and trouble swallowing.    Eyes:  Negative for discharge and redness.   Respiratory:  Positive for cough. Negative for shortness of breath.    Cardiovascular:  Negative for chest pain.   Gastrointestinal:  Negative for abdominal pain, diarrhea, nausea and vomiting.   Genitourinary:  Negative for decreased urine volume.   Musculoskeletal:  Negative for gait problem, myalgias, neck pain and neck stiffness.   Skin:  Negative for rash.   Neurological:  Negative for headaches.   Psychiatric/Behavioral: Negative.       Physical Exam   BP: 137/79  Pulse: 60  Temp: 97  F (36.1  C)  Resp: 19  SpO2: 96 %    Physical Exam  Vitals and nursing note reviewed.   Constitutional:       General: She is not in acute distress.     Appearance: Normal appearance. She is not ill-appearing or toxic-appearing.   HENT:      Right Ear: Tympanic membrane, ear canal and external ear normal.      Left Ear: Tympanic membrane, ear canal and external ear normal.      Nose: Congestion and rhinorrhea present.      Right Sinus: Maxillary sinus tenderness and frontal sinus tenderness present.      Left Sinus: Maxillary sinus tenderness and frontal sinus tenderness present.      Mouth/Throat:      Mouth: Mucous membranes are moist.      Pharynx: Oropharynx is clear. No oropharyngeal exudate or posterior oropharyngeal erythema.   Cardiovascular:      Rate and Rhythm: Normal rate and regular rhythm.      Pulses: Normal pulses.      Heart sounds: Normal heart sounds.   Pulmonary:      Effort: Pulmonary effort is normal.      Breath sounds: Normal breath sounds.   Musculoskeletal:       Cervical back: Normal range of motion and neck supple. No rigidity or tenderness.   Lymphadenopathy:      Cervical: No cervical adenopathy.   Neurological:      Mental Status: She is alert.   Psychiatric:         Mood and Affect: Mood normal.       ED Course     Procedures    Results for orders placed or performed during the hospital encounter of 01/06/25 (from the past 24 hours)   Influenza A/B, RSV and SARS-CoV2 PCR (COVID-19) Nasopharyngeal    Specimen: Nasopharyngeal; Swab   Result Value Ref Range    Influenza A PCR Negative Negative    Influenza B PCR Negative Negative    RSV PCR Negative Negative    SARS CoV2 PCR Positive (A) Negative    Narrative    Testing was performed using the Xpert Xpress CoV2/Flu/RSV Assay on the [employer] GeneXpert Instrument. This test should be ordered for the detection of SARS-CoV2, influenza, and RSV viruses in individuals with signs and symptoms of respiratory tract infection. This test is for in vitro diagnostic use under the [place] FDA for laboratories certified under CLIA to perform high or moderate complexity testing. This test has been [place] FDA cleared. A negative result does not rule out the presence of PCR inhibitors in the specimen or target RNA in concentration below the limit of detection for the assay. If only one viral target is positive but coinfection with multiple targets is suspected, the sample should be re-tested with another FDA cleared, approved, or authorized test, if coninfection would change clinical management. This test was validated by the [facility]. These laboratories are certified under the Clinical Laboratory Improvement Amendments of 1988 (CLIA-88) as qualified to perfom high complexity laboratory testing.       Medications - No data to display    Assessments & Plan (with Medical Decision Making)     I have reviewed the nursing notes.    I have reviewed the findings, diagnosis, plan and need for follow up with the patient.  (U07.1)  COVID-19 virus infection  (primary encounter diagnosis)  Plan:   Patient ambulatory with a nontoxic appearance.  Lungs clear throughout.  No signs of otitis media or strep.  Continues to have sinus pain and pressure.  No abdominal pain, vomiting or diarrhea.  No rashes.  Patient has been sick over the past month, symptoms did start to improve and then worsened again.  Patient was seen on 12/30/2024 and diagnosed with sinusitis and started on Augmentin.  Patient continues to have sinusitis, COVID, influenza and RSV test completed, patient positive for COVID.  Symptoms consistent with viral sinusitis.  Patient to continue Augmentin as previously ordered.  Recommend daily Flonase which patient has at home.  Follow-up with primary care provider or return to urgent care/ED with any worsening in condition or additional concerns.  Patient in agreement treatment plan.    Discharge Medication List as of 1/6/2025 10:18 AM        Final diagnoses:   COVID-19 virus infection     1/6/2025   [facility]       Imelda Licea NP  01/06/25 1026

## 2025-01-06 NOTE — ED TRIAGE NOTES
C/o congestion    Congestion/drainage, ear pain, cough    Was in 6 days ago, on aug  States that its not helping

## 2025-01-10 ENCOUNTER — LAB (OUTPATIENT)
Dept: LAB | Facility: OTHER | Age: 70
End: 2025-01-10
Attending: FAMILY MEDICINE
Payer: MEDICARE

## 2025-01-10 DIAGNOSIS — E78.5 HYPERLIPIDEMIA LDL GOAL <130: ICD-10-CM

## 2025-01-10 PROBLEM — D47.3 ESSENTIAL THROMBOCYTOSIS (H): Status: RESOLVED | Noted: 2017-03-15 | Resolved: 2025-01-10

## 2025-01-10 LAB
ALBUMIN SERPL BCG-MCNC: 4.2 G/DL (ref 3.5–5.2)
ALP SERPL-CCNC: 109 U/L (ref 40–150)
ALT SERPL W P-5'-P-CCNC: 31 U/L (ref 0–50)
ANION GAP SERPL CALCULATED.3IONS-SCNC: 14 MMOL/L (ref 7–15)
AST SERPL W P-5'-P-CCNC: 26 U/L (ref 0–45)
BILIRUB SERPL-MCNC: 0.5 MG/DL
BUN SERPL-MCNC: 11.8 MG/DL (ref 8–23)
CALCIUM SERPL-MCNC: 9.8 MG/DL (ref 8.8–10.4)
CHLORIDE SERPL-SCNC: 101 MMOL/L (ref 98–107)
CHOLEST SERPL-MCNC: 178 MG/DL
CREAT SERPL-MCNC: 0.73 MG/DL (ref 0.51–0.95)
EGFRCR SERPLBLD CKD-EPI 2021: 88 ML/MIN/1.73M2
FASTING STATUS PATIENT QL REPORTED: YES
FASTING STATUS PATIENT QL REPORTED: YES
GLUCOSE SERPL-MCNC: 92 MG/DL (ref 70–99)
HCO3 SERPL-SCNC: 25 MMOL/L (ref 22–29)
HDLC SERPL-MCNC: 42 MG/DL
LDLC SERPL CALC-MCNC: 102 MG/DL
NONHDLC SERPL-MCNC: 136 MG/DL
POTASSIUM SERPL-SCNC: 3.9 MMOL/L (ref 3.4–5.3)
PROT SERPL-MCNC: 7.6 G/DL (ref 6.4–8.3)
SODIUM SERPL-SCNC: 140 MMOL/L (ref 135–145)
TRIGL SERPL-MCNC: 172 MG/DL

## 2025-01-10 PROCEDURE — 36415 COLL VENOUS BLD VENIPUNCTURE: CPT | Mod: ZL

## 2025-01-10 PROCEDURE — 80061 LIPID PANEL: CPT | Mod: ZL

## 2025-01-10 PROCEDURE — 83718 ASSAY OF LIPOPROTEIN: CPT | Mod: ZL

## 2025-01-10 PROCEDURE — 80053 COMPREHEN METABOLIC PANEL: CPT | Mod: ZL

## 2025-01-10 PROCEDURE — 82310 ASSAY OF CALCIUM: CPT | Mod: ZL

## 2025-04-10 ENCOUNTER — LAB (OUTPATIENT)
Dept: LAB | Facility: OTHER | Age: 70
End: 2025-04-10
Payer: MEDICARE

## 2025-04-10 DIAGNOSIS — E78.5 HYPERLIPIDEMIA LDL GOAL <130: ICD-10-CM

## 2025-04-10 LAB
ALBUMIN SERPL BCG-MCNC: 4.3 G/DL (ref 3.5–5.2)
ALP SERPL-CCNC: 112 U/L (ref 40–150)
ALT SERPL W P-5'-P-CCNC: 29 U/L (ref 0–50)
ANION GAP SERPL CALCULATED.3IONS-SCNC: 7 MMOL/L (ref 7–15)
AST SERPL W P-5'-P-CCNC: 27 U/L (ref 0–45)
BILIRUB SERPL-MCNC: 0.7 MG/DL
BUN SERPL-MCNC: 14.1 MG/DL (ref 8–23)
CALCIUM SERPL-MCNC: 9.7 MG/DL (ref 8.8–10.4)
CHLORIDE SERPL-SCNC: 99 MMOL/L (ref 98–107)
CHOLEST SERPL-MCNC: 194 MG/DL
CREAT SERPL-MCNC: 0.69 MG/DL (ref 0.51–0.95)
EGFRCR SERPLBLD CKD-EPI 2021: >90 ML/MIN/1.73M2
FASTING STATUS PATIENT QL REPORTED: YES
FASTING STATUS PATIENT QL REPORTED: YES
GLUCOSE SERPL-MCNC: 87 MG/DL (ref 70–99)
HCO3 SERPL-SCNC: 32 MMOL/L (ref 22–29)
HDLC SERPL-MCNC: 45 MG/DL
LDLC SERPL CALC-MCNC: 123 MG/DL
NONHDLC SERPL-MCNC: 149 MG/DL
POTASSIUM SERPL-SCNC: 3.9 MMOL/L (ref 3.4–5.3)
PROT SERPL-MCNC: 7.6 G/DL (ref 6.4–8.3)
SODIUM SERPL-SCNC: 138 MMOL/L (ref 135–145)
TRIGL SERPL-MCNC: 129 MG/DL

## 2025-04-10 PROCEDURE — 82310 ASSAY OF CALCIUM: CPT | Mod: ZL

## 2025-04-10 PROCEDURE — 84155 ASSAY OF PROTEIN SERUM: CPT | Mod: ZL

## 2025-04-10 PROCEDURE — 80061 LIPID PANEL: CPT | Mod: ZL

## 2025-04-10 PROCEDURE — 36415 COLL VENOUS BLD VENIPUNCTURE: CPT | Mod: ZL

## 2025-04-10 RX ORDER — ROSUVASTATIN CALCIUM 5 MG/1
5 TABLET, COATED ORAL DAILY
Qty: 30 TABLET | Refills: 1 | Status: SHIPPED | OUTPATIENT
Start: 2025-04-10

## 2025-05-12 DIAGNOSIS — E78.5 HYPERLIPIDEMIA LDL GOAL <130: ICD-10-CM

## 2025-05-12 RX ORDER — ROSUVASTATIN CALCIUM 5 MG/1
5 TABLET, COATED ORAL DAILY
Qty: 30 TABLET | Refills: 5 | Status: SHIPPED | OUTPATIENT
Start: 2025-05-12

## 2025-05-12 NOTE — TELEPHONE ENCOUNTER
Crestor      Last Written Prescription Date:  4/10/25  Last Fill Quantity: 30,   # refills: 1  Last Office Visit: 1/10/25  Future Office visit:       Routing refill request to provider for review/approval because:

## 2025-05-17 ENCOUNTER — TRANSFERRED RECORDS (OUTPATIENT)
Dept: HEALTH INFORMATION MANAGEMENT | Facility: CLINIC | Age: 70
End: 2025-05-17

## 2025-06-10 ENCOUNTER — LAB (OUTPATIENT)
Dept: LAB | Facility: OTHER | Age: 70
End: 2025-06-10
Payer: MEDICARE

## 2025-06-10 ENCOUNTER — RESULTS FOLLOW-UP (OUTPATIENT)
Dept: FAMILY MEDICINE | Facility: OTHER | Age: 70
End: 2025-06-10

## 2025-06-10 ENCOUNTER — MYC REFILL (OUTPATIENT)
Dept: FAMILY MEDICINE | Facility: OTHER | Age: 70
End: 2025-06-10

## 2025-06-10 DIAGNOSIS — E78.5 HYPERLIPIDEMIA LDL GOAL <130: ICD-10-CM

## 2025-06-10 LAB
ALBUMIN SERPL BCG-MCNC: 4.1 G/DL (ref 3.5–5.2)
ALP SERPL-CCNC: 106 U/L (ref 40–150)
ALT SERPL W P-5'-P-CCNC: 28 U/L (ref 0–50)
ANION GAP SERPL CALCULATED.3IONS-SCNC: 10 MMOL/L (ref 7–15)
AST SERPL W P-5'-P-CCNC: 26 U/L (ref 0–45)
BILIRUB SERPL-MCNC: 0.5 MG/DL
BUN SERPL-MCNC: 20.9 MG/DL (ref 8–23)
CALCIUM SERPL-MCNC: 9.9 MG/DL (ref 8.8–10.4)
CHLORIDE SERPL-SCNC: 103 MMOL/L (ref 98–107)
CHOLEST SERPL-MCNC: 132 MG/DL
CREAT SERPL-MCNC: 0.76 MG/DL (ref 0.51–0.95)
EGFRCR SERPLBLD CKD-EPI 2021: 84 ML/MIN/1.73M2
FASTING STATUS PATIENT QL REPORTED: YES
FASTING STATUS PATIENT QL REPORTED: YES
GLUCOSE SERPL-MCNC: 98 MG/DL (ref 70–99)
HCO3 SERPL-SCNC: 27 MMOL/L (ref 22–29)
HDLC SERPL-MCNC: 44 MG/DL
LDLC SERPL CALC-MCNC: 59 MG/DL
NONHDLC SERPL-MCNC: 88 MG/DL
POTASSIUM SERPL-SCNC: 4 MMOL/L (ref 3.4–5.3)
PROT SERPL-MCNC: 7.5 G/DL (ref 6.4–8.3)
SODIUM SERPL-SCNC: 140 MMOL/L (ref 135–145)
TRIGL SERPL-MCNC: 144 MG/DL

## 2025-06-10 PROCEDURE — 36415 COLL VENOUS BLD VENIPUNCTURE: CPT | Mod: ZL

## 2025-06-10 PROCEDURE — 82465 ASSAY BLD/SERUM CHOLESTEROL: CPT | Mod: ZL

## 2025-06-10 PROCEDURE — 3048F LDL-C <100 MG/DL: CPT

## 2025-06-10 PROCEDURE — 84155 ASSAY OF PROTEIN SERUM: CPT | Mod: ZL

## 2025-06-10 RX ORDER — ROSUVASTATIN CALCIUM 5 MG/1
5 TABLET, COATED ORAL DAILY
Qty: 30 TABLET | Refills: 5 | OUTPATIENT
Start: 2025-06-10

## 2025-06-23 ENCOUNTER — MYC MEDICAL ADVICE (OUTPATIENT)
Dept: FAMILY MEDICINE | Facility: OTHER | Age: 70
End: 2025-06-23

## 2025-06-25 NOTE — TELEPHONE ENCOUNTER
"6/25/2025 1:43 PM  Writer called patient and offered patient appointment tomorrow pt declined due to \"plans\". Writer offered covering provider pt declined. Pt reports her schedule is busy due to her daughter's medical appointments out of town. Writer scheduled next available opening that worked with patient's schedule.     Tressa Peres RN    "

## 2025-06-25 NOTE — TELEPHONE ENCOUNTER
Patient called in regards to the below message.  She is willing to see Ana but cannot wait until mid July (the next available opening) Please call her back with a sooner date/time if it's possible to fit her in.  Thank you!

## 2025-07-08 DIAGNOSIS — E78.5 HYPERLIPIDEMIA LDL GOAL <130: ICD-10-CM

## 2025-07-08 NOTE — TELEPHONE ENCOUNTER
Rosuvastatin 5 mg      Last Written Prescription Date:  05/12/2025  Last Fill Quantity: 30,   # refills: 5  Last Office Visit: 01/10/2025  Future Office visit:    Next 5 appointments (look out 90 days)      Jul 28, 2025 9:00 AM  (Arrive by 8:45 AM)  Provider Visit with Ana Loya PA-C  [facility] ([facility] [place] ) [address]  [place] [address]  [phone]             Routing refill request to provider for review/approval because:  Medication is reported/historical

## 2025-07-09 RX ORDER — ROSUVASTATIN CALCIUM 5 MG/1
5 TABLET, COATED ORAL DAILY
Qty: 30 TABLET | Refills: 5 | OUTPATIENT
Start: 2025-07-09

## 2025-07-24 NOTE — PROGRESS NOTES
"  Assessment & Plan     Neck pain  Sternocleidomastoid muscle tenderness  Has had worsening pain along the SCM with rotation to the right and lateral flexion to the left. Has recently gotten new pillows. No bony tenderness on the cervical spine. Imaging noted for moderate degenerative changes.  Suspect muscular spasm. Discussed referral to PT and trial of Voltaren for pain and inflammation. Advised follow-up if symptoms do not improve.   - Physical Therapy  Referral; Future  - XR Cervical Spine 2/3 Views; Future  - diclofenac (VOLTAREN) 1 % topical gel; Apply 2 g topically 3 times daily.    External hemorrhoids  Has had episodes of blood when wiping after walking. No history of constipation. Is not eating fiber. Exam notable for external hemorrhoid. Discussed continued use of Tucks pads. Advised sitz baths. Will give hydrocortisone cream to help with itching to apply 2x as needed. Advised increase in fiber.  If continues to have symptoms, will refer to general surgery for further evaluation.   - hydrocortisone (CORTAID) 1 % external ointment; Apply topically 2 times daily.    Other fatigue  Has had worsening fatigue. Discussed thyroid evaluation. TSH today is normal. Discussed continuing with adequate sleep, exercise and hydration.  Will continue to monitor.   - TSH with free T4 reflex; Future  - TSH with free T4 reflex    BMI  Estimated body mass index is 32.57 kg/m  as calculated from the following:    Height as of 1/10/25: 1.511 m (4' 11.5\").    Weight as of this encounter: 74.4 kg (164 lb).           Diana Blue is a 69 year old, presenting for the following health issues:  Rectal Problem (Pt would like her thyroid checked also.)      Had covid in January of 2025 and had diarrhea for approximately 1month. Since having covid has noticed blood when wiping and increased moisture. Feels like sandpaper when wiping. Will have sx 1x a week. Preparation H has not been helpful. Tucks have been helpful. " Regular bowel movements otherwise. Drinking adequate water. Is not eating much fiber. Has noticed will fall asleep with simply tasks like reading.       7/28/2025     9:02 AM   Additional Questions   Roomed by Carlie Rodriguez   Accompanied by None         7/28/2025     9:02 AM   Patient Reported Additional Medications   Patient reports taking the following new medications None     History of Present Illness       Reason for visit:  Issues  Symptom onset:  More than a month   She is taking medications regularly.      Pt also would like her thyroid checked  Pt states pain in her right side of neck  Hemorrhoids  Onset/Duration: Feb2025  Description:   Amber-anal lump: No  Pain: YES- feels like sandpaper when I walk  Itching: YES- sometimes  Accompanying Signs & Symptoms:  Blood in stool: No  Changes in stool pattern: No  History:   Any previous GI studies done:Colonoscopy 9years ago  Family History of colon cancer: No  Precipitating factors:   walking  Alleviating factors:  Tucks  Therapies tried and outcome: Tucks    Pain History:  When did you first notice your pain? 6 weeks   Have you seen anyone else for your pain? No  How has your pain affected your ability to work? retired  Where in your body do you have pain? Neck Pain  Onset/Duration: 6 weeks ago  Description:   Location: neck right side    Radiation: into the right neck  Intensity: mild but sometimes severe with certain movements  Progression of Symptoms:  same  Accompanying Signs & Symptoms:  Burning, tingling, prickly sensation in arm(s): No  Numbness in arm(s): No  Weakness in arm(s):  No  Fever: No  Headache: No  Nausea and/or vomiting: No  History:   Trauma: No  Previous neck pain: YES- years ago  Previous surgery or injections: No  Previous Imaging (MRI,X ray): YES- 2018  Precipitating or alleviating factors: None  Does movement impact the pain:  YES  Therapies tried and outcome: acetaminophen and Ibuprofen    PDMP Review       None              Review of  Systems  Constitutional, HEENT, cardiovascular, pulmonary, GI, , musculoskeletal, neuro, skin, endocrine and psych systems are negative, except as otherwise noted.      Objective    /75 (BP Location: Right arm, Patient Position: Sitting, Cuff Size: Adult Regular)   Pulse (!) 47   Temp 97.3  F (36.3  C) (Tympanic)   Resp 20   Wt 74.4 kg (164 lb)   SpO2 98%   BMI 32.57 kg/m    Body mass index is 32.57 kg/m .  Physical Exam   GENERAL: alert and no distress  EYES: Eyes grossly normal to inspection,   Neck: no thyromegaly  RESP: lungs clear to auscultation - no rales, rhonchi or wheezes  CV: regular rate and rhythm, normal S1 S2, no S3 or S4, no murmur, click or rub, no peripheral edema  ABDOMEN: soft, nontender, no  no masses and bowel sounds normal  RECTAL (female): external hemorrhoid  MS: no gross musculoskeletal defects noted, no edema  ORTHO: Cervical Spine Exam: Inspection: normal cervical lordosis  Tender:  SCM and levator scapulae  Non-tender:  spinous processes, left paracervical muscles, left trapezius muscles, right trapezius muscles  Range of Motion:  Full ROM of cervical spine  Strength: Full strength of all neck muscles    PSYCH: mentation appears normal, affect normal/bright    Recent Results (from the past 24 hours)   TSH with free T4 reflex   Result Value Ref Range    TSH 1.90 0.30 - 4.20 uIU/mL   XR Cervical Spine 2/3 Views    Narrative    PROCEDURE: XR CERVICAL SPINE 2/3 VIEWS 7/28/2025 10:06 AM    HISTORY: Neck pain; Sternocleidomastoid muscle tenderness    COMPARISONS: 2018    TECHNIQUE: AP and lateral    FINDINGS: There is mild decrease in height in the C5-C6 disc with  degenerative osteophytes. The remainder of the discs are normal. There  are facet joint degenerative changes noted bilaterally most severe in  the upper cervical spine. The prevertebral soft tissues appear normal.         Impression    IMPRESSION: Moderate degenerative changes of the cervical spine    CONTRERAS ADAN  MD         SYSTEM ID:  [id number]         40 minutes spent by me on the date of the encounter doing chart review, history and exam, documentation and further activities per the note.    Signed Electronically by: Ana Loya PA-C

## 2025-07-28 ENCOUNTER — OFFICE VISIT (OUTPATIENT)
Dept: FAMILY MEDICINE | Facility: OTHER | Age: 70
End: 2025-07-28
Attending: STUDENT IN AN ORGANIZED HEALTH CARE EDUCATION/TRAINING PROGRAM
Payer: MEDICARE

## 2025-07-28 ENCOUNTER — ANCILLARY PROCEDURE (OUTPATIENT)
Dept: GENERAL RADIOLOGY | Facility: OTHER | Age: 70
End: 2025-07-28
Attending: STUDENT IN AN ORGANIZED HEALTH CARE EDUCATION/TRAINING PROGRAM
Payer: MEDICARE

## 2025-07-28 VITALS
BODY MASS INDEX: 32.57 KG/M2 | TEMPERATURE: 97.3 F | OXYGEN SATURATION: 98 % | SYSTOLIC BLOOD PRESSURE: 138 MMHG | DIASTOLIC BLOOD PRESSURE: 75 MMHG | HEART RATE: 47 BPM | WEIGHT: 164 LBS | RESPIRATION RATE: 20 BRPM

## 2025-07-28 DIAGNOSIS — M54.2 NECK PAIN: Primary | ICD-10-CM

## 2025-07-28 DIAGNOSIS — M79.12 STERNOCLEIDOMASTOID MUSCLE TENDERNESS: ICD-10-CM

## 2025-07-28 DIAGNOSIS — K64.4 EXTERNAL HEMORRHOIDS: ICD-10-CM

## 2025-07-28 DIAGNOSIS — M54.2 NECK PAIN: ICD-10-CM

## 2025-07-28 DIAGNOSIS — R53.83 OTHER FATIGUE: ICD-10-CM

## 2025-07-28 LAB — TSH SERPL DL<=0.005 MIU/L-ACNC: 1.9 UIU/ML (ref 0.3–4.2)

## 2025-07-28 PROCEDURE — 72040 X-RAY EXAM NECK SPINE 2-3 VW: CPT | Mod: 26 | Performed by: RADIOLOGY

## 2025-07-28 PROCEDURE — 84443 ASSAY THYROID STIM HORMONE: CPT | Mod: ZL | Performed by: STUDENT IN AN ORGANIZED HEALTH CARE EDUCATION/TRAINING PROGRAM

## 2025-07-28 PROCEDURE — 36415 COLL VENOUS BLD VENIPUNCTURE: CPT | Mod: ZL | Performed by: STUDENT IN AN ORGANIZED HEALTH CARE EDUCATION/TRAINING PROGRAM

## 2025-07-28 PROCEDURE — G0463 HOSPITAL OUTPT CLINIC VISIT: HCPCS

## 2025-07-28 PROCEDURE — 72040 X-RAY EXAM NECK SPINE 2-3 VW: CPT | Mod: TC

## 2025-07-28 ASSESSMENT — PAIN SCALES - GENERAL: PAINLEVEL_OUTOF10: NO PAIN (0)

## 2025-08-11 ENCOUNTER — THERAPY VISIT (OUTPATIENT)
Dept: PHYSICAL THERAPY | Facility: HOSPITAL | Age: 70
End: 2025-08-11
Attending: STUDENT IN AN ORGANIZED HEALTH CARE EDUCATION/TRAINING PROGRAM
Payer: MEDICARE

## 2025-08-11 DIAGNOSIS — M79.12 STERNOCLEIDOMASTOID MUSCLE TENDERNESS: ICD-10-CM

## 2025-08-11 DIAGNOSIS — M54.2 NECK PAIN: ICD-10-CM

## 2025-08-18 ENCOUNTER — THERAPY VISIT (OUTPATIENT)
Dept: PHYSICAL THERAPY | Facility: HOSPITAL | Age: 70
End: 2025-08-18
Attending: STUDENT IN AN ORGANIZED HEALTH CARE EDUCATION/TRAINING PROGRAM
Payer: MEDICARE

## 2025-08-18 DIAGNOSIS — M54.2 NECK PAIN: Primary | ICD-10-CM

## 2025-08-18 PROCEDURE — 97140 MANUAL THERAPY 1/> REGIONS: CPT | Mod: GP,CQ

## 2025-08-18 PROCEDURE — 97110 THERAPEUTIC EXERCISES: CPT | Mod: GP,CQ

## 2025-08-25 ENCOUNTER — THERAPY VISIT (OUTPATIENT)
Dept: PHYSICAL THERAPY | Facility: HOSPITAL | Age: 70
End: 2025-08-25
Attending: STUDENT IN AN ORGANIZED HEALTH CARE EDUCATION/TRAINING PROGRAM
Payer: MEDICARE

## 2025-08-25 DIAGNOSIS — M54.2 NECK PAIN: Primary | ICD-10-CM

## 2025-08-25 DIAGNOSIS — M79.12 STERNOCLEIDOMASTOID MUSCLE TENDERNESS: ICD-10-CM

## 2025-08-25 PROCEDURE — 97140 MANUAL THERAPY 1/> REGIONS: CPT | Mod: GP

## 2025-08-25 PROCEDURE — 97110 THERAPEUTIC EXERCISES: CPT | Mod: GP

## 2025-09-02 ENCOUNTER — THERAPY VISIT (OUTPATIENT)
Dept: PHYSICAL THERAPY | Facility: HOSPITAL | Age: 70
End: 2025-09-02
Attending: STUDENT IN AN ORGANIZED HEALTH CARE EDUCATION/TRAINING PROGRAM
Payer: MEDICARE

## 2025-09-02 DIAGNOSIS — M54.2 NECK PAIN: Primary | ICD-10-CM

## 2025-09-02 DIAGNOSIS — M79.12 STERNOCLEIDOMASTOID MUSCLE TENDERNESS: ICD-10-CM

## 2025-09-02 PROCEDURE — 97110 THERAPEUTIC EXERCISES: CPT | Mod: GP

## 2025-09-02 PROCEDURE — 97140 MANUAL THERAPY 1/> REGIONS: CPT | Mod: GP

## (undated) DEVICE — CANISTER-SUCTION 2000CC

## (undated) DEVICE — TUBING-SUCTION 20FT

## (undated) DEVICE — FORCEP-COLON C NEEDLE  SWING JAW  FB-220UA

## (undated) DEVICE — LUBRICANT JELLY 2OZ. TUBE

## (undated) DEVICE — IRRIGATION-H2O 1000ML

## (undated) RX ORDER — LIDOCAINE HYDROCHLORIDE 20 MG/ML
INJECTION, SOLUTION EPIDURAL; INFILTRATION; INTRACAUDAL; PERINEURAL
Status: DISPENSED
Start: 2017-04-17

## (undated) RX ORDER — PROPOFOL 10 MG/ML
INJECTION, EMULSION INTRAVENOUS
Status: DISPENSED
Start: 2017-04-17

## (undated) RX ORDER — FENTANYL CITRATE 50 UG/ML
INJECTION, SOLUTION INTRAMUSCULAR; INTRAVENOUS
Status: DISPENSED
Start: 2017-04-17